# Patient Record
Sex: MALE | Race: WHITE
[De-identification: names, ages, dates, MRNs, and addresses within clinical notes are randomized per-mention and may not be internally consistent; named-entity substitution may affect disease eponyms.]

---

## 2017-08-30 ENCOUNTER — APPOINTMENT (OUTPATIENT)
Dept: DERMATOLOGY | Facility: CLINIC | Age: 77
End: 2017-08-30

## 2020-07-26 ENCOUNTER — INPATIENT (INPATIENT)
Facility: HOSPITAL | Age: 80
LOS: 1 days | Discharge: HOME | DRG: 312 | End: 2020-07-28
Attending: INTERNAL MEDICINE | Admitting: INTERNAL MEDICINE
Payer: MEDICARE

## 2020-07-26 VITALS
OXYGEN SATURATION: 100 % | DIASTOLIC BLOOD PRESSURE: 57 MMHG | WEIGHT: 223.99 LBS | SYSTOLIC BLOOD PRESSURE: 135 MMHG | HEIGHT: 71 IN | RESPIRATION RATE: 16 BRPM | TEMPERATURE: 98 F | HEART RATE: 38 BPM

## 2020-07-26 DIAGNOSIS — R55 SYNCOPE AND COLLAPSE: ICD-10-CM

## 2020-07-26 LAB
ALBUMIN SERPL ELPH-MCNC: 3.2 G/DL — LOW (ref 3.3–5)
ALP SERPL-CCNC: 58 U/L — SIGNIFICANT CHANGE UP (ref 40–120)
ALT FLD-CCNC: 18 U/L — SIGNIFICANT CHANGE UP (ref 12–78)
ANION GAP SERPL CALC-SCNC: 6 MMOL/L — SIGNIFICANT CHANGE UP (ref 5–17)
APTT BLD: 29.5 SEC — SIGNIFICANT CHANGE UP (ref 27.5–35.5)
AST SERPL-CCNC: 14 U/L — LOW (ref 15–37)
BASOPHILS # BLD AUTO: 0.07 K/UL — SIGNIFICANT CHANGE UP (ref 0–0.2)
BASOPHILS NFR BLD AUTO: 0.8 % — SIGNIFICANT CHANGE UP (ref 0–2)
BILIRUB SERPL-MCNC: 0.4 MG/DL — SIGNIFICANT CHANGE UP (ref 0.2–1.2)
BUN SERPL-MCNC: 17 MG/DL — SIGNIFICANT CHANGE UP (ref 7–23)
CALCIUM SERPL-MCNC: 8.8 MG/DL — SIGNIFICANT CHANGE UP (ref 8.5–10.1)
CHLORIDE SERPL-SCNC: 108 MMOL/L — SIGNIFICANT CHANGE UP (ref 96–108)
CO2 SERPL-SCNC: 27 MMOL/L — SIGNIFICANT CHANGE UP (ref 22–31)
CREAT SERPL-MCNC: 0.94 MG/DL — SIGNIFICANT CHANGE UP (ref 0.5–1.3)
EOSINOPHIL # BLD AUTO: 0.39 K/UL — SIGNIFICANT CHANGE UP (ref 0–0.5)
EOSINOPHIL NFR BLD AUTO: 4.6 % — SIGNIFICANT CHANGE UP (ref 0–6)
GLUCOSE SERPL-MCNC: 93 MG/DL — SIGNIFICANT CHANGE UP (ref 70–99)
HCT VFR BLD CALC: 37.8 % — LOW (ref 39–50)
HGB BLD-MCNC: 12.7 G/DL — LOW (ref 13–17)
IMM GRANULOCYTES NFR BLD AUTO: 0.7 % — SIGNIFICANT CHANGE UP (ref 0–1.5)
INR BLD: 1.23 RATIO — HIGH (ref 0.88–1.16)
LYMPHOCYTES # BLD AUTO: 2.11 K/UL — SIGNIFICANT CHANGE UP (ref 1–3.3)
LYMPHOCYTES # BLD AUTO: 25 % — SIGNIFICANT CHANGE UP (ref 13–44)
MAGNESIUM SERPL-MCNC: 2.3 MG/DL — SIGNIFICANT CHANGE UP (ref 1.6–2.6)
MCHC RBC-ENTMCNC: 31.4 PG — SIGNIFICANT CHANGE UP (ref 27–34)
MCHC RBC-ENTMCNC: 33.6 GM/DL — SIGNIFICANT CHANGE UP (ref 32–36)
MCV RBC AUTO: 93.6 FL — SIGNIFICANT CHANGE UP (ref 80–100)
MONOCYTES # BLD AUTO: 0.72 K/UL — SIGNIFICANT CHANGE UP (ref 0–0.9)
MONOCYTES NFR BLD AUTO: 8.5 % — SIGNIFICANT CHANGE UP (ref 2–14)
NEUTROPHILS # BLD AUTO: 5.09 K/UL — SIGNIFICANT CHANGE UP (ref 1.8–7.4)
NEUTROPHILS NFR BLD AUTO: 60.4 % — SIGNIFICANT CHANGE UP (ref 43–77)
NT-PROBNP SERPL-SCNC: 1020 PG/ML — HIGH (ref 0–450)
PLATELET # BLD AUTO: 183 K/UL — SIGNIFICANT CHANGE UP (ref 150–400)
POTASSIUM SERPL-MCNC: 4 MMOL/L — SIGNIFICANT CHANGE UP (ref 3.5–5.3)
POTASSIUM SERPL-SCNC: 4 MMOL/L — SIGNIFICANT CHANGE UP (ref 3.5–5.3)
PROT SERPL-MCNC: 6.7 GM/DL — SIGNIFICANT CHANGE UP (ref 6–8.3)
PROTHROM AB SERPL-ACNC: 14.1 SEC — HIGH (ref 10.6–13.6)
RBC # BLD: 4.04 M/UL — LOW (ref 4.2–5.8)
RBC # FLD: 11.9 % — SIGNIFICANT CHANGE UP (ref 10.3–14.5)
SARS-COV-2 IGG SERPL QL IA: NEGATIVE — SIGNIFICANT CHANGE UP
SARS-COV-2 IGM SERPL IA-ACNC: 0.09 INDEX — SIGNIFICANT CHANGE UP
SARS-COV-2 RNA SPEC QL NAA+PROBE: SIGNIFICANT CHANGE UP
SODIUM SERPL-SCNC: 141 MMOL/L — SIGNIFICANT CHANGE UP (ref 135–145)
TROPONIN I SERPL-MCNC: <0.015 NG/ML — SIGNIFICANT CHANGE UP (ref 0.01–0.04)
TROPONIN I SERPL-MCNC: <0.015 NG/ML — SIGNIFICANT CHANGE UP (ref 0.01–0.04)
WBC # BLD: 8.44 K/UL — SIGNIFICANT CHANGE UP (ref 3.8–10.5)
WBC # FLD AUTO: 8.44 K/UL — SIGNIFICANT CHANGE UP (ref 3.8–10.5)

## 2020-07-26 PROCEDURE — 86769 SARS-COV-2 COVID-19 ANTIBODY: CPT

## 2020-07-26 PROCEDURE — 84484 ASSAY OF TROPONIN QUANT: CPT

## 2020-07-26 PROCEDURE — 83735 ASSAY OF MAGNESIUM: CPT

## 2020-07-26 PROCEDURE — 36415 COLL VENOUS BLD VENIPUNCTURE: CPT

## 2020-07-26 PROCEDURE — 71045 X-RAY EXAM CHEST 1 VIEW: CPT | Mod: 26

## 2020-07-26 PROCEDURE — 97116 GAIT TRAINING THERAPY: CPT | Mod: GP

## 2020-07-26 PROCEDURE — 99223 1ST HOSP IP/OBS HIGH 75: CPT

## 2020-07-26 PROCEDURE — 87635 SARS-COV-2 COVID-19 AMP PRB: CPT

## 2020-07-26 PROCEDURE — 84100 ASSAY OF PHOSPHORUS: CPT

## 2020-07-26 PROCEDURE — 97162 PT EVAL MOD COMPLEX 30 MIN: CPT | Mod: GP

## 2020-07-26 PROCEDURE — 80048 BASIC METABOLIC PNL TOTAL CA: CPT

## 2020-07-26 PROCEDURE — 93010 ELECTROCARDIOGRAM REPORT: CPT

## 2020-07-26 PROCEDURE — 85027 COMPLETE CBC AUTOMATED: CPT

## 2020-07-26 RX ORDER — ASPIRIN/CALCIUM CARB/MAGNESIUM 324 MG
81 TABLET ORAL DAILY
Refills: 0 | Status: DISCONTINUED | OUTPATIENT
Start: 2020-07-26 | End: 2020-07-28

## 2020-07-26 RX ORDER — CLOPIDOGREL BISULFATE 75 MG/1
75 TABLET, FILM COATED ORAL DAILY
Refills: 0 | Status: DISCONTINUED | OUTPATIENT
Start: 2020-07-26 | End: 2020-07-28

## 2020-07-26 RX ORDER — SIMVASTATIN 20 MG/1
10 TABLET, FILM COATED ORAL AT BEDTIME
Refills: 0 | Status: DISCONTINUED | OUTPATIENT
Start: 2020-07-26 | End: 2020-07-28

## 2020-07-26 RX ORDER — ONDANSETRON 8 MG/1
4 TABLET, FILM COATED ORAL EVERY 6 HOURS
Refills: 0 | Status: DISCONTINUED | OUTPATIENT
Start: 2020-07-26 | End: 2020-07-28

## 2020-07-26 RX ORDER — CLOPIDOGREL BISULFATE 75 MG/1
1 TABLET, FILM COATED ORAL
Qty: 0 | Refills: 0 | DISCHARGE

## 2020-07-26 RX ORDER — SIMVASTATIN 20 MG/1
1 TABLET, FILM COATED ORAL
Qty: 0 | Refills: 0 | DISCHARGE

## 2020-07-26 RX ORDER — LISINOPRIL 2.5 MG/1
10 TABLET ORAL DAILY
Refills: 0 | Status: DISCONTINUED | OUTPATIENT
Start: 2020-07-26 | End: 2020-07-28

## 2020-07-26 RX ORDER — LISINOPRIL 2.5 MG/1
1 TABLET ORAL
Qty: 0 | Refills: 0 | DISCHARGE

## 2020-07-26 RX ORDER — ASPIRIN/CALCIUM CARB/MAGNESIUM 324 MG
324 TABLET ORAL ONCE
Refills: 0 | Status: COMPLETED | OUTPATIENT
Start: 2020-07-26 | End: 2020-07-26

## 2020-07-26 RX ADMIN — CLOPIDOGREL BISULFATE 75 MILLIGRAM(S): 75 TABLET, FILM COATED ORAL at 18:29

## 2020-07-26 RX ADMIN — SIMVASTATIN 10 MILLIGRAM(S): 20 TABLET, FILM COATED ORAL at 23:05

## 2020-07-26 RX ADMIN — LISINOPRIL 10 MILLIGRAM(S): 2.5 TABLET ORAL at 23:06

## 2020-07-26 RX ADMIN — Medication 324 MILLIGRAM(S): at 14:08

## 2020-07-26 NOTE — H&P ADULT - NSHPPHYSICALEXAM_GEN_ALL_CORE
heent nc at perrla  chest cta  cvs s1s2 reg no m/g/r  abd soft nt nd +bs  extr no e/c/c  neuro non focal

## 2020-07-26 NOTE — ED PROVIDER NOTE - OBJECTIVE STATEMENT
78 y/o male with a PMHx of prostate CA, hypercholesterolemia, psoriasis, abdominal hernia, MI s/p stents x3, presents to the ED s/p near syncopal episode today. Pt reports he stood up from his bed to go to the bathroom, took 3 steps, and became dizzy with a room spinning sensation and fell to the floor. Episode witnessed by pt wife. Denies head injury, LOC. No SOB, chest pain. Denies history of similar occurrence in the past. Denies current dizziness in ED. Former smoker (quit smoking cigars 4 weeks ago). No other complaints at this time. NKDA. PCP: Dr. Sameer Rodriguez. Cardio: Dr. Chaudhary.

## 2020-07-26 NOTE — H&P ADULT - HISTORY OF PRESENT ILLNESS
78 y/o male with a PMHx of prostate CA, hypercholesterolemia, psoriasis, abdominal hernia, MI s/p stents x3, presents to the ED s/p near syncopal episode today. Pt reports he stood up from his bed to go to the bathroom, took 3 steps, and became dizzy with a room spinning sensation and fell to the floor. Episode witnessed by pt wife. Denies head injury, LOC. No SOB, chest pain. Denies history of similar occurrence in the past. Still dizzy if he moves fast. Case d/w cardio adm to CCU and dc BB; SWAPNA castorena

## 2020-07-26 NOTE — ED ADULT NURSE NOTE - OBJECTIVE STATEMENT
patietn biba for near syncopal episode.  He was attempting to get from his bed to commode when room started spinning and he fell to the floor he denies LOC or head strike.   He was seen by PT in house house yesterday and told his HR was low, he contacted dr cunha's office and was told to  monitor for dizziness.  HR on arrival was in the 30's fluctuating between 34-57.  Pateint conversant throughout assessment.

## 2020-07-26 NOTE — H&P ADULT - ASSESSMENT
* Iatrogenic symptomatic bradycardia  dc BB  pads on the chest  gentle hydration  cardio f/up in am  EP eval doubt he will need PPM    * CAD stents  DAP  hold BB  ACEi    f/up official CXR    case d/w wife

## 2020-07-26 NOTE — ED ADULT TRIAGE NOTE - CHIEF COMPLAINT QUOTE
Pt. to the ED BIBA and Spouse C/O Dizziness. Wife states patient had near syncope episode and fell into floor. denies hitting head and LOC- + Cardiac hx and Hemorraghic stroke. Pt. currently bradycardic in the high 30s - Denies CP and SOB

## 2020-07-26 NOTE — ED PROVIDER NOTE - CLINICAL SUMMARY MEDICAL DECISION MAKING FREE TEXT BOX
79M hx cad s/p stents hld presents to the ED for near syncopal episode. now bradycardic sinus no signs of av block. will obtain labs trop cardiac monitor touch base with cardiology and reassess. Bert Larios M.D., Attending Physician

## 2020-07-26 NOTE — ED PROVIDER NOTE - PROGRESS NOTE DETAILS
Jodie Llanes for attending Dr. Larios: Paged cardiology. Jodie Llanes for attending Dr. Larios: Spoke with Dr. Logan covering for Dr. Chaudhary, recommends admission for observation overnight and will see pt in the morning. pt endorsed to dr. Martinez accepts. Bert Larios M.D., Attending Physician

## 2020-07-26 NOTE — ED PROVIDER NOTE - NS ED ROS FT
Constitutional: +near syncopal episode. No fever or chills  Eyes: No visual changes  HEENT: No throat pain  CV: No chest pain  Resp: No SOB no cough  GI: No abd pain, nausea or vomiting  : No dysuria  MSK: No musculoskeletal pain  Skin: No rash  Neuro: +dizziness. No headache, No LOC

## 2020-07-26 NOTE — ED PROVIDER NOTE - PMH
Abdominal Hernia    Hypercholesterolemia    Prostate Cancer  treated with radiation only  Psoriasis    S/P Angioplasty    s/p Angioplasty with Stent  to LAD and OM2 9/14/09  s/p Angioplasty with Stent (ICD9 V45.82)  10/1/09 to RCA

## 2020-07-26 NOTE — ED PROVIDER NOTE - NS_ ATTENDINGSCRIBEDETAILS _ED_A_ED_FT
I, Bert Larios MD,  performed the initial face to face bedside interview with this patient regarding history of present illness, review of symptoms and relevant past medical, social and family history.  I completed an independent physical examination.  I was the initial provider who evaluated this patient.  The history, relevant review of systems, past medical and surgical history, medical decision making, and physical examination was documented by the scribe in my presence and I attest to the accuracy of the documentation.

## 2020-07-26 NOTE — H&P ADULT - NSICDXPASTMEDICALHX_GEN_ALL_CORE_FT
PAST MEDICAL HISTORY:  Abdominal Hernia     Hypercholesterolemia     Prostate Cancer treated with radiation only    Psoriasis     S/P Angioplasty     s/p Angioplasty with Stent to LAD and OM2 9/14/09    s/p Angioplasty with Stent (ICD9 V45.82) 10/1/09 to RCA

## 2020-07-26 NOTE — ED PROVIDER NOTE - PHYSICAL EXAMINATION
Constitutional: NAD AAOx3  Eyes: PERRLA EOMI  Head: Normocephalic atraumatic  Mouth: MMM  Cardiac: regular rate   Resp: Lungs CTAB  GI: Abd s/nt/nd  Neuro: CN2-12 intact  Skin: No rashes Constitutional: NAD AAOx3  Eyes: PERRLA EOMI  Head: Normocephalic atraumatic  Mouth: MMM  Cardiac: bradycardic normal peripheral pulses no LE edema  Resp: Lungs CTAB  GI: Abd s/nt/nd  Neuro: CN2-12 intact  Skin: No rashes

## 2020-07-26 NOTE — H&P ADULT - NSHPLABSRESULTS_GEN_ALL_CORE
12.7   8.44  )-----------( 183      ( 26 Jul 2020 13:34 )             37.8   07-26    141  |  108  |  17  ----------------------------<  93  4.0   |  27  |  0.94    Ca    8.8      26 Jul 2020 13:34  Mg     2.3     07-26    TPro  6.7  /  Alb  3.2<L>  /  TBili  0.4  /  DBili  x   /  AST  14<L>  /  ALT  18  /  AlkPhos  58  07-26      EKG sinus abram, pvc    CXR my read: bibasilar atelectasis vs infiltrates- asymptomatic

## 2020-07-27 LAB
APPEARANCE UR: CLEAR — SIGNIFICANT CHANGE UP
BILIRUB UR-MCNC: NEGATIVE — SIGNIFICANT CHANGE UP
COLOR SPEC: YELLOW — SIGNIFICANT CHANGE UP
DIFF PNL FLD: NEGATIVE — SIGNIFICANT CHANGE UP
GLUCOSE UR QL: NEGATIVE MG/DL — SIGNIFICANT CHANGE UP
KETONES UR-MCNC: NEGATIVE — SIGNIFICANT CHANGE UP
LEUKOCYTE ESTERASE UR-ACNC: ABNORMAL
NITRITE UR-MCNC: NEGATIVE — SIGNIFICANT CHANGE UP
PH UR: 8 — SIGNIFICANT CHANGE UP (ref 5–8)
PROT UR-MCNC: 15 MG/DL
SP GR SPEC: 1.01 — SIGNIFICANT CHANGE UP (ref 1.01–1.02)
UROBILINOGEN FLD QL: NEGATIVE MG/DL — SIGNIFICANT CHANGE UP

## 2020-07-27 PROCEDURE — 99233 SBSQ HOSP IP/OBS HIGH 50: CPT

## 2020-07-27 PROCEDURE — 99223 1ST HOSP IP/OBS HIGH 75: CPT

## 2020-07-27 PROCEDURE — 93280 PM DEVICE PROGR EVAL DUAL: CPT | Mod: 26

## 2020-07-27 RX ORDER — SODIUM CHLORIDE 9 MG/ML
1000 INJECTION INTRAMUSCULAR; INTRAVENOUS; SUBCUTANEOUS
Refills: 0 | Status: DISCONTINUED | OUTPATIENT
Start: 2020-07-27 | End: 2020-07-28

## 2020-07-27 RX ADMIN — SODIUM CHLORIDE 50 MILLILITER(S): 9 INJECTION INTRAMUSCULAR; INTRAVENOUS; SUBCUTANEOUS at 10:11

## 2020-07-27 RX ADMIN — CLOPIDOGREL BISULFATE 75 MILLIGRAM(S): 75 TABLET, FILM COATED ORAL at 08:16

## 2020-07-27 RX ADMIN — LISINOPRIL 10 MILLIGRAM(S): 2.5 TABLET ORAL at 10:11

## 2020-07-27 RX ADMIN — SIMVASTATIN 10 MILLIGRAM(S): 20 TABLET, FILM COATED ORAL at 21:47

## 2020-07-27 RX ADMIN — Medication 81 MILLIGRAM(S): at 08:16

## 2020-07-27 NOTE — DIETITIAN INITIAL EVALUATION ADULT. - PHYSICAL APPEARANCE
overweight/other (specify) NFPE- no muscle/fat wasting noted.  Skin- stage 2 sacrum w/ R/L +2 edema documented. Beny score-16 (high risk of further skin breakdown)

## 2020-07-27 NOTE — CONSULT NOTE ADULT - ASSESSMENT
Dizziness  Sinus bradycardia sec to beta blockers  Orthostatic hypotension  CAD  HTN  HLD    Suggest:    Hydration. monitor orthostatics  Monitor off beta blockers for today. Given that he has PACs and PVCs (some in couplets) will be better with a reduced dose of atenolol 25 mg daily  Continue other current care.  Patient wants to be discharged home today - I think he needs further observation. If he does leave against advise he should follow up with me at the office tomorrow for Holter monitor and possibly ILR in future.

## 2020-07-27 NOTE — DIETITIAN INITIAL EVALUATION ADULT. - PERTINENT MEDS FT
MEDICATIONS  (STANDING):  aspirin enteric coated 81 milliGRAM(s) Oral daily  clopidogrel Tablet 75 milliGRAM(s) Oral daily  lisinopril 10 milliGRAM(s) Oral daily  simvastatin 10 milliGRAM(s) Oral at bedtime  sodium chloride 0.9%. 1000 milliLiter(s) (50 mL/Hr) IV Continuous <Continuous>    MEDICATIONS  (PRN):  aluminum hydroxide/magnesium hydroxide/simethicone Suspension 30 milliLiter(s) Oral every 4 hours PRN Dyspepsia  ondansetron Injectable 4 milliGRAM(s) IV Push every 6 hours PRN Nausea

## 2020-07-27 NOTE — PROGRESS NOTE ADULT - SUBJECTIVE AND OBJECTIVE BOX
HOSPITALIST PROGRESS NOTE:  SUBJECTIVE:  PCP:  Chief Complaint: Patient is a 79y old  Male who presents with a chief complaint of dizzy (2020 13:59)      HPI:  78 y/o male with a PMHx of prostate CA, hypercholesterolemia, psoriasis, abdominal hernia, MI s/p stents x3, presents to the ED s/p near syncopal episode today. Pt reports he stood up from his bed to go to the bathroom, took 3 steps, and became dizzy with a room spinning sensation and fell to the floor. Episode witnessed by pt wife. Denies head injury, LOC. No SOB, chest pain. Denies history of similar occurrence in the past. Still dizzy if he moves fast. Case d/w cardio adm to CCU and dc BB; SWAPNA castorena (2020 15:06)    : This morning significantly orthostatic and wants to go home; Explained to him that he cant amy he was dizzy going from lying to sitting       Allergies:  codeine (Vomiting; Nausea)    REVIEW OF SYSTEMS:  See HPI. All other review of systems is negative unless indicated above.     OBJECTIVE  Physical Exam:  Vital Signs:    Vital Signs Last 24 Hrs  T(C): 36.8 (2020 13:00), Max: 36.8 (2020 13:00)  T(F): 98.2 (2020 13:00), Max: 98.2 (2020 13:00)  HR: 51 (2020 16:00) (44 - 60)  BP: 139/56 (2020 16:00) (125/84 - 158/66)  BP(mean): 79 (2020 16:00) (76 - 94)  RR: 18 (2020 16:00) (6 - 20)  SpO2: 98% (2020 16:00) (95% - 100%)  I&O's Summary    2020 07:01  -  2020 07:00  --------------------------------------------------------  IN: 240 mL / OUT: 775 mL / NET: -535 mL    2020 07:01  -  2020 16:41  --------------------------------------------------------  IN: 840 mL / OUT: 625 mL / NET: 215 mL      Constitutional: NAD, awake and alert, well-developed  Neurological: AAO x 3, no focal deficits  HEENT: PERRLA, EOMI, MMM  Neck: Soft and supple, No LAD, No JVD  Respiratory: Breath sounds are clear bilaterally, No wheezing, rales or rhonchi  Cardiovascular: S1 and S2, regular rate and rhythm; no Murmurs, gallops or rubs  Gastrointestinal: Bowel Sounds present, soft, nontender, nondistended, no guarding, no rebound tenderness  Back: No CVA tenderness   Extremities: No peripheral edema  Vascular: 2+ peripheral pulses  Musculoskeletal: 5/5 strength b/l upper and lower extremities  Skin: No rashes  Breast: Deferred  Rectal: Deferred    MEDICATIONS  (STANDING):  aspirin enteric coated 81 milliGRAM(s) Oral daily  clopidogrel Tablet 75 milliGRAM(s) Oral daily  lisinopril 10 milliGRAM(s) Oral daily  simvastatin 10 milliGRAM(s) Oral at bedtime  sodium chloride 0.9%. 1000 milliLiter(s) (50 mL/Hr) IV Continuous <Continuous>      LABS: All Labs Reviewed:                        12.7   8.44  )-----------( 183      ( 2020 13:34 )             37.8     07-26    141  |  108  |  17  ----------------------------<  93  4.0   |  27  |  0.94    Ca    8.8      2020 13:34  Mg     2.3     07-26    TPro  6.7  /  Alb  3.2<L>  /  TBili  0.4  /  DBili  x   /  AST  14<L>  /  ALT  18  /  AlkPhos  58  07-26    PT/INR - ( 2020 13:34 )   PT: 14.1 sec;   INR: 1.23 ratio         PTT - ( 2020 13:34 )  PTT:29.5 sec  CARDIAC MARKERS ( 2020 16:55 )  <0.015 ng/mL / x     / x     / x     / x      CARDIAC MARKERS ( 2020 13:34 )  <0.015 ng/mL / x     / x     / x     / x            Urinalysis Basic - ( 2020 14:30 )    Color: Yellow / Appearance: Clear / S.010 / pH: x  Gluc: x / Ketone: Negative  / Bili: Negative / Urobili: Negative mg/dL   Blood: x / Protein: 15 mg/dL / Nitrite: Negative   Leuk Esterase: Trace / RBC: Negative /HPF / WBC 6-10   Sq Epi: x / Non Sq Epi: Few / Bacteria: Occasional      RADIOLOGY/EKG:    < from: Xray Chest 1 View-PORTABLE IMMEDIATE (20 @ 13:47) >    IMPRESSION: No active pulmonary disease.    Thank you for this referral.    < end of copied text >

## 2020-07-27 NOTE — CONSULT NOTE ADULT - ASSESSMENT
78 y/o male with a PMHx of prostate CA, hypercholesterolemia, psoriasis, abdominal hernia, MI s/p stents x3, presents to the ED s/p near syncopal episode today. Pt reports he stood up from his bed to go to the bathroom, took 3 steps, and became dizzy with a room spinning sensation and fell to the floor. Episode witnessed by pt wife. Denies head injury, LOC. No SOB, chest pain. Denies history of similar occurrence in the past. In ED patient found to be bradycardic.  EP asked to consult for symptomatic bradycardia.    A/P: Symptomatic bradycardia 78 y/o male with a PMHx of prostate CA, hypercholesterolemia, psoriasis, abdominal hernia, MI s/p stents x3, presents to the ED s/p near syncopal episode today. Pt reports he stood up from his bed to go to the bathroom, took 3 steps, and became dizzy with a room spinning sensation and fell to the floor. Episode witnessed by pt wife. Denies head injury, LOC. No SOB, chest pain. Denies history of similar occurrence in the past. In ED patient found to be bradycardic.  EP asked to consult for symptomatic bradycardia.    A/P: Symptomatic bradycardia  Continue telemonitoring  Monitor Electrolytes  Avoid further Atenolol  MCOT upon discharge  Further care per medicine 80 y/o male with a PMHx of prostate CA, hypercholesterolemia, psoriasis, abdominal hernia, MI s/p stents x3, presents to the ED s/p near syncopal episode today. Pt reports he stood up from his bed to go to the bathroom, took 3 steps, and became dizzy with a room spinning sensation and fell to the floor. Episode witnessed by pt wife. Denies head injury, LOC. No SOB, chest pain. Denies history of similar occurrence in the past. In ED patient found to be bradycardic.  EP asked to consult for symptomatic bradycardia.    A/P: Symptomatic bradycardia  Continue telemonitoring  Monitor orthostatics  Monitor Electrolytes  Avoid further Atenolol or other AV pio blockers  Patient should have MCOT placed upon discharge  Further care per medicine  Plan discussed with Dr. Mascorro 78 y/o male with a PMHx of prostate CA, hypercholesterolemia, psoriasis, abdominal hernia, MI s/p stents x3, presents to the ED s/p near syncopal episode today. Pt reports he stood up from his bed to go to the bathroom, took 3 steps, and became dizzy with a room spinning sensation and fell to the floor. Episode witnessed by pt wife. Denies head injury, LOC. No SOB, chest pain. Denies history of similar occurrence in the past. In ED patient found to be bradycardic.  EP asked to consult for symptomatic bradycardia.    A/P: Symptomatic bradycardia and sinus node dysfunction, patient likely has chronotropic incompetence (as per wife when he was dizzy after stadning up then sat down and HR 40s bpm)  however unclear if symptoms are related to sinus bradycardia, it appears that he is dehydrated and dizziness elicited only after he stood up  encouraged him to stay hydrated and wear compression stocking   if he continues to have symptoms that correlate with sinus bradycardia despite maintaining adequate hydration then he warrants PPM for symptomatic bradycardia   Continue telemonitoring  Monitor orthostatics  Monitor Electrolytes  Avoid further Atenolol or other AV pio blockers  Patient should have MCOT placed upon discharge follow with Dr Chaudhary and EP as needed  Further care per medicine  Plan discussed with Dr. Mascorro

## 2020-07-27 NOTE — CONSULT NOTE ADULT - SUBJECTIVE AND OBJECTIVE BOX
Patient is a 79y old  Male who presents with a chief complaint of dizzy (26 Jul 2020 15:06)      HPI:  80 y/o male with a PMHx of prostate CA, hypercholesterolemia, psoriasis, abdominal hernia, MI s/p stents x3, presents to the ED s/p near syncopal episode today. Pt reports he stood up from his bed to go to the bathroom, took 3 steps, and became dizzy with a room spinning sensation and fell to the floor. Episode witnessed by pt wife. Denies head injury, LOC. No SOB, chest pain. Denies history of similar occurrence in the past. Still dizzy if he moves fast. Case d/w cardio adm to CCU and dc BB; EP raffaele (26 Jul 2020 15:06)      PAST MEDICAL & SURGICAL HISTORY:  s/p Angioplasty with Stent (ICD9 V45.82): 10/1/09 to RCA  S/P Angioplasty  s/p Angioplasty with Stent: to LAD and OM2 9/14/09  Abdominal Hernia  Psoriasis  Hypercholesterolemia  Prostate Cancer: treated with radiation only  S/P Shoulder Surgery: right  History of Tonsillectomy      PREVIOUS CARDIAC WORKUP:      Echo:  Stress Test:  Cardiac Cath:    ALLERGIES:    codeine (Vomiting; Nausea)       MEDICATIONS  (STANDING):  aspirin enteric coated 81 milliGRAM(s) Oral daily  clopidogrel Tablet 75 milliGRAM(s) Oral daily  lisinopril 10 milliGRAM(s) Oral daily  simvastatin 10 milliGRAM(s) Oral at bedtime  sodium chloride 0.9%. 1000 milliLiter(s) (50 mL/Hr) IV Continuous <Continuous>    MEDICATIONS  (PRN):  aluminum hydroxide/magnesium hydroxide/simethicone Suspension 30 milliLiter(s) Oral every 4 hours PRN Dyspepsia  ondansetron Injectable 4 milliGRAM(s) IV Push every 6 hours PRN Nausea      FAMILY HISTORY:        SOCIAL HISTORY:  .scl     ROS:     .ros    Vital Signs Last 24 Hrs  T(C): 36.7 (27 Jul 2020 08:18), Max: 36.8 (26 Jul 2020 13:22)  T(F): 98.1 (27 Jul 2020 08:18), Max: 98.3 (26 Jul 2020 13:22)  HR: 44 (27 Jul 2020 08:00) (38 - 57)  BP: 149/61 (27 Jul 2020 08:00) (132/56 - 156/57)  BP(mean): 82 (27 Jul 2020 08:00) (76 - 94)  RR: 20 (27 Jul 2020 08:00) (6 - 20)  SpO2: 100% (27 Jul 2020 08:00) (95% - 100%)    I&O's Summary    26 Jul 2020 07:01  -  27 Jul 2020 07:00  --------------------------------------------------------  IN: 240 mL / OUT: 775 mL / NET: -535 mL    27 Jul 2020 07:01  -  27 Jul 2020 09:38  --------------------------------------------------------  IN: 120 mL / OUT: 300 mL / NET: -180 mL        PHYSICAL EXAM:    .phy      TELEMETRY:    ECG:    LABS:                          12.7   8.44  )-----------( 183      ( 26 Jul 2020 13:34 )             37.8     07-26    141  |  108  |  17  ----------------------------<  93  4.0   |  27  |  0.94    Ca    8.8      26 Jul 2020 13:34  Mg     2.3     07-26    TPro  6.7  /  Alb  3.2<L>  /  TBili  0.4  /  DBili  x   /  AST  14<L>  /  ALT  18  /  AlkPhos  58  07-26 07-26 @ 16:55  Trop-I  <0.015    07-26 @ 13:34  Trop-I  <0.015    Pro BNP  1020 07-26 @ 13:34    PT/INR - ( 26 Jul 2020 13:34 )   PT: 14.1 sec;   INR: 1.23 ratio    PTT - ( 26 Jul 2020 13:34 )  PTT:29.5 sec      RADIOLOGY & ADDITIONAL STUDIES:    Xray Chest 1 View-PORTABLE IMMEDIATE (07.26.20 @ 13:47) >  IMPRESSION: No active pulmonary disease. Chief complaint of dizzy (26 Jul 2020 15:06)      HPI:    79-year-old male admitted with complaints of dizziness and a fall.  Near syncopal episode.  No loss of consciousness.  Patient reports dizziness when he stood up from his bed to go to the bathroom.  He became dizzy, feeling of room spinning around him and subsequently took a fall to the floor.  He was unable to get up even with the help of his wife and his son.  Ambulance was called for further help.  Patient was found to be bradycardic.  Currently no more dizziness.  Blood pressure is better but he is orthostatic.      PAST MEDICAL & SURGICAL HISTORY:  b/l thalamic CVA March 2020  CAD s/p Angioplasty with Stent (ICD9 V45.82): 10/1/09 to RCA  S/P Angioplasty  s/p Angioplasty with Stent: to LAD and OM2 9/14/09  Abdominal Hernia  Psoriasis  Hypercholesterolemia  Prostate Cancer: treated with radiation only  S/P Shoulder Surgery: right  History of Tonsillectomy      PREVIOUS CARDIAC WORKUP:      Echo:  8/19  --There is moderate left atrial dilatation (LA volume index 42 ml/m²).  --The left ventricle is mildly dilated.  --LV global wall motion is normal. The basal inferior segment is moderately hypokinetic.  --LV ejection fraction (55 %) is normal.  --The left ventricular filling pattern is normal.  --There is mild right atrial dilatation.  --There is trace mitral regurgitation.  --There is mild tricuspid regurgitation.  --The right atrial pressure is 6 - 10 mm Hg. There is minimal pulmonary hypertension.  --There is no pericardial effusion.    Stress Test:  8/19   1. Myocardial perfusion imaging revealed no ischemia and a medium size infarct with stress.   2. Gated study showed a rest EF of 57%.   3. Indeterminate ECG evidence of ischemia. Abnormal baseline EKG.   4. Myocardial Perfusion SPECT images are abnormal.   5. Medium size, moderate to severe defect in the basal inferior, basal inferolateral and mid inferolateral wall that is fixed.   6. Rest gated analysis showed overall normal left ventricular function with normal LV size.   7. Wall motion was abnormal.   8. Gated study showed an exercise EF of 55%.   9. Post-stress analysis showed no change in LV function.    Cardiac Cath Lab (10.29.09 @ 13:59) >  Ventricles: No LV gram was performed during this study; however, a prior LV gram demonstrated an EF of 60 %.  Coronary vessels: The coronary circulation is right dominant.  LM:      LM: Normal.  LAD:      Proximal LAD: Angiography showed mild atherosclerosis with no flow limiting lesions. Mid LAD: Angiography showed mild atherosclerosis with no flow limiting lesions. Patent stent. Distal vessel angiography showed a large sized vessel and minor luminal irregularities.  CX:      Proximal circumflex: Angiography showed mild atherosclerosis with no flow limiting lesions. OM2: Angiography showed minor luminal irregularities with no flow limiting lesions. Patent stent. Distal vessel angiography showed a large sized vessel.  RCA:      Mid RCA: Angiography showed minor luminal irregularities with no flow limiting lesions. Patent stent. Distal vessel angiography showed a large sized vessel and mild diffuse disease.      ALLERGIES:    codeine (Vomiting; Nausea)       MEDICATIONS  (STANDING):  aspirin enteric coated 81 milliGRAM(s) Oral daily  clopidogrel Tablet 75 milliGRAM(s) Oral daily  lisinopril 10 milliGRAM(s) Oral daily  simvastatin 10 milliGRAM(s) Oral at bedtime  sodium chloride 0.9%. 1000 milliLiter(s) (50 mL/Hr) IV Continuous <Continuous>    MEDICATIONS  (PRN):  aluminum hydroxide/magnesium hydroxide/simethicone Suspension 30 milliLiter(s) Oral every 4 hours PRN Dyspepsia  ondansetron Injectable 4 milliGRAM(s) IV Push every 6 hours PRN Nausea      FAMILY HISTORY:   NC        SOCIAL HISTORY:  Nonsmoker. No ETOH abuse. No illicit drugs.     ROS:     Detailed ten system ROS was performed and was negative except for history as eluded to above.    no fever  no chills  no nausea  no vomiting  no diarrhea  no constipation  no melena  no hematochezia  no chest pain  no palpitations  no sob at rest  no dyspnea on exertion  no cough  no wheezing  no anorexia  no headache  no dizziness - resolved   no syncope  no weakness  no myalgia  no dysuria  no polyuria  no hematuria     Vital Signs Last 24 Hrs  T(C): 36.7 (27 Jul 2020 08:18), Max: 36.8 (26 Jul 2020 13:22)  T(F): 98.1 (27 Jul 2020 08:18), Max: 98.3 (26 Jul 2020 13:22)  HR: 44 (27 Jul 2020 08:00) (38 - 57)  BP: 149/61 (27 Jul 2020 08:00) (132/56 - 156/57)  BP(mean): 82 (27 Jul 2020 08:00) (76 - 94)  RR: 20 (27 Jul 2020 08:00) (6 - 20)  SpO2: 100% (27 Jul 2020 08:00) (95% - 100%)    I&O's Summary    26 Jul 2020 07:01  -  27 Jul 2020 07:00  --------------------------------------------------------  IN: 240 mL / OUT: 775 mL / NET: -535 mL    27 Jul 2020 07:01  -  27 Jul 2020 09:38  --------------------------------------------------------  IN: 120 mL / OUT: 300 mL / NET: -180 mL        PHYSICAL EXAM:    General:                Comfortable, AAO X 3, in no distress.  HEENT:                  Atraumatic, PERRLA, EOMI, conjunctiva clear.    Neck:                     Supple, no adenopathy, no thyromegaly, no JVD, no bruit.  Back:                     Symmetric, non tender.  Chest:                    Clear, B/L symmetric air entry, no tachypnea  Heart:                     S1, S2 normal, no gallop, no murmur.  Abdomen:              Soft, non-tender, bowel sounds active. No palpable masses.  Extremities:           no cyanosis, no edema. Peripheral pulses normal.  Skin:                      Skin color, texture normal. No rashes.  Neurologic:            Grossly nonfocal.       TELEMETRY:  Sinus bradycardia. No significant pauses.  Rare PACs noted and rare PVCs rarely in couplets.    ECG:  Sinus bradycardia. No AV block    LABS:                          12.7   8.44  )-----------( 183      ( 26 Jul 2020 13:34 )             37.8     07-26    141  |  108  |  17  ----------------------------<  93  4.0   |  27  |  0.94    Ca    8.8      26 Jul 2020 13:34  Mg     2.3     07-26    TPro  6.7  /  Alb  3.2<L>  /  TBili  0.4  /  DBili  x   /  AST  14<L>  /  ALT  18  /  AlkPhos  58  07-26 07-26 @ 16:55  Trop-I  <0.015    07-26 @ 13:34  Trop-I  <0.015    Pro BNP  1020 07-26 @ 13:34    PT/INR - ( 26 Jul 2020 13:34 )   PT: 14.1 sec;   INR: 1.23 ratio    PTT - ( 26 Jul 2020 13:34 )  PTT:29.5 sec      RADIOLOGY & ADDITIONAL STUDIES:    Xray Chest 1 View-PORTABLE IMMEDIATE (07.26.20 @ 13:47) >  IMPRESSION: No active pulmonary disease.

## 2020-07-27 NOTE — DIETITIAN INITIAL EVALUATION ADULT. - PERTINENT LABORATORY DATA
07-26 Na141 mmol/L Glu 93 mg/dL K+ 4.0 mmol/L Cr  0.94 mg/dL BUN 17 mg/dL Phos n/a   Alb 3.2 g/dL<L> PAB n/a

## 2020-07-27 NOTE — DIETITIAN INITIAL EVALUATION ADULT. - ADD RECOMMEND
1) Continue with optimal po intake and adherence to therapeutic diet 2) Suggest add MVI w/minerals, Vit C 500 mg BID, add Zinc Sulfate 220 mg x 10 days to promote wound healing. 3) daily weights 4) turn and position for optimal wound healing.

## 2020-07-27 NOTE — CONSULT NOTE ADULT - SUBJECTIVE AND OBJECTIVE BOX
HPI:  78 y/o male with a PMHx of prostate CA, hypercholesterolemia, psoriasis, abdominal hernia, MI s/p stents x3, presents to the ED s/p near syncopal episode today. Pt reports he stood up from his bed to go to the bathroom, took 3 steps, and became dizzy with a room spinning sensation and fell to the floor. Episode witnessed by pt wife. Denies head injury, LOC. No SOB, chest pain. Denies history of similar occurrence in the past. In ED patient found to be bradycardic.  EP asked to consult for symptomatic bradycardia.      PAST MEDICAL & SURGICAL HISTORY:  s/p Angioplasty with Stent (ICD9 V45.82): 10/1/09 to RCA  S/P Angioplasty  s/p Angioplasty with Stent: to LAD and OM2 09  Abdominal Hernia  Psoriasis  Hypercholesterolemia  Prostate Cancer: treated with radiation only  S/P Shoulder Surgery: right  History of Tonsillectomy      MEDICATIONS  (STANDING):  aspirin enteric coated 81 milliGRAM(s) Oral daily  clopidogrel Tablet 75 milliGRAM(s) Oral daily  lisinopril 10 milliGRAM(s) Oral daily  simvastatin 10 milliGRAM(s) Oral at bedtime  sodium chloride 0.9%. 1000 milliLiter(s) (50 mL/Hr) IV Continuous <Continuous>    MEDICATIONS  (PRN):  aluminum hydroxide/magnesium hydroxide/simethicone Suspension 30 milliLiter(s) Oral every 4 hours PRN Dyspepsia  ondansetron Injectable 4 milliGRAM(s) IV Push every 6 hours PRN Nausea      Allergies    codeine (Vomiting; Nausea)          SOCIAL HISTORY: Denies tobacco, etoh abuse or illicit drug use    FAMILY HISTORY:      Vital Signs Last 24 Hrs  T(C): 36.7 (2020 08:18), Max: 36.7 (2020 08:18)  T(F): 98.1 (2020 08:18), Max: 98.1 (2020 08:18)  HR: 52 (2020 11:00) (38 - 60)  BP: 158/66 (2020 11:00) (125/84 - 158/66)  BP(mean): 86 (2020 11:00) (76 - 94)  RR: 14 (2020 11:00) (6 - 20)  SpO2: 98% (2020 11:00) (95% - 100%)    REVIEW OF SYSTEMS:    CONSTITUTIONAL:  As per HPI.  HEENT:  Eyes:  No diplopia or blurred vision. ENT:  No earache, sore throat or runny nose.  CARDIOVASCULAR:  No pressure, squeezing, strangling, tightness, heaviness or aching about the chest, neck, axilla or epigastrium.  RESPIRATORY:  No cough, shortness of breath, PND or orthopnea.  GASTROINTESTINAL:  No nausea, vomiting or diarrhea.  GENITOURINARY:  No dysuria, frequency or urgency.  MUSCULOSKELETAL:  As per HPI.  SKIN:  No change in skin, hair or nails.  NEUROLOGIC:  No paresthesias, fasciculations, seizures or weakness.  PSYCHIATRIC:  No disorder of thought or mood.  ENDOCRINE:  No heat or cold intolerance, polyuria or polydipsia.  HEMATOLOGICAL:  No easy bruising or bleedings:  .     PHYSICAL EXAMINATION:    GENERAL APPEARANCE:  Pt. is not currently dyspneic, in no distress. Pt. is alert, oriented, and pleasant.  HEENT:  Pupils are normal and react normally. No icterus. Mucous membranes well colored.  NECK:  Supple. No lymphadenopathy. Jugular venous pressure not elevated. Carotids equal.   HEART:   The cardiac impulse has a normal quality. There are no murmurs, rubs or gallops noted  CHEST:  Chest is clear to auscultation. Normal respiratory effort.  ABDOMEN:  Soft and nontender.   EXTREMITIES:  There is no edema.   SKIN:  No rash or significant lesions are noted.    I&O's Summary    2020 07:  -  2020 07:00  --------------------------------------------------------  IN: 240 mL / OUT: 775 mL / NET: -535 mL    2020 07:  -  2020 14:02  --------------------------------------------------------  IN: 120 mL / OUT: 425 mL / NET: -305 mL        LABS:                        12.7   8.44  )-----------( 183      ( 2020 13:34 )             37.8         141  |  108  |  17  ----------------------------<  93  4.0   |  27  |  0.94    Ca    8.8      2020 13:34  Mg     2.3         TPro  6.7  /  Alb  3.2<L>  /  TBili  0.4  /  DBili  x   /  AST  14<L>  /  ALT  18  /  AlkPhos  58  07-    LIVER FUNCTIONS - ( 2020 13:34 )  Alb: 3.2 g/dL / Pro: 6.7 gm/dL / ALK PHOS: 58 U/L / ALT: 18 U/L / AST: 14 U/L / GGT: x           PT/INR - ( 2020 13:34 )   PT: 14.1 sec;   INR: 1.23 ratio         PTT - ( 2020 13:34 )  PTT:29.5 sec  CARDIAC MARKERS ( 2020 16:55 )  <0.015 ng/mL / x     / x     / x     / x      CARDIAC MARKERS ( 2020 13:34 )  <0.015 ng/mL / x     / x     / x     / x                EK2020:  SB@45BPM  DC:152ms  QRS:90ms  QT/Qtc:506/437ms    TELEMETRY: SB with HR 40-50s    CARDIAC TESTS:      RADIOLOGY & ADDITIONAL STUDIES:    ASSESSMENT & PLAN: HPI:  78 y/o male with a PMHx of prostate CA, hypercholesterolemia, psoriasis, abdominal hernia, MI s/p stents x3, presents to the ED s/p near syncopal episode today. Pt reports he stood up from his bed to go to the bathroom, took 3 steps, and became dizzy with a room spinning sensation and fell to the floor. Episode witnessed by pt wife. Denies head injury, LOC. No SOB, chest pain. Denies history of similar occurrence in the past. In ED patient found to be bradycardic.  EP asked to consult for symptomatic bradycardia.      PAST MEDICAL & SURGICAL HISTORY:  s/p Angioplasty with Stent (ICD9 V45.82): 10/1/09 to RCA  S/P Angioplasty  s/p Angioplasty with Stent: to LAD and OM2 09  Abdominal Hernia  Psoriasis  Hypercholesterolemia  Prostate Cancer: treated with radiation only  S/P Shoulder Surgery: right  History of Tonsillectomy      MEDICATIONS  (STANDING):  aspirin enteric coated 81 milliGRAM(s) Oral daily  clopidogrel Tablet 75 milliGRAM(s) Oral daily  lisinopril 10 milliGRAM(s) Oral daily  simvastatin 10 milliGRAM(s) Oral at bedtime  sodium chloride 0.9%. 1000 milliLiter(s) (50 mL/Hr) IV Continuous <Continuous>    MEDICATIONS  (PRN):  aluminum hydroxide/magnesium hydroxide/simethicone Suspension 30 milliLiter(s) Oral every 4 hours PRN Dyspepsia  ondansetron Injectable 4 milliGRAM(s) IV Push every 6 hours PRN Nausea      Allergies    codeine (Vomiting; Nausea)          SOCIAL HISTORY: Denies tobacco, etoh abuse or illicit drug use    FAMILY HISTORY:      Vital Signs Last 24 Hrs  T(C): 36.7 (2020 08:18), Max: 36.7 (2020 08:18)  T(F): 98.1 (2020 08:18), Max: 98.1 (2020 08:18)  HR: 52 (2020 11:00) (38 - 60)  BP: 158/66 (2020 11:00) (125/84 - 158/66)  BP(mean): 86 (2020 11:00) (76 - 94)  RR: 14 (2020 11:00) (6 - 20)  SpO2: 98% (2020 11:00) (95% - 100%)    REVIEW OF SYSTEMS:    CONSTITUTIONAL:  As per HPI.  HEENT:  Eyes:  No diplopia or blurred vision. ENT:  No earache, sore throat or runny nose.  CARDIOVASCULAR:  No pressure, squeezing, strangling, tightness, heaviness or aching about the chest, neck, axilla or epigastrium.  RESPIRATORY:  No cough, shortness of breath, PND or orthopnea.  GASTROINTESTINAL:  No nausea, vomiting or diarrhea.  GENITOURINARY:  No dysuria, frequency or urgency.  MUSCULOSKELETAL:  As per HPI.  SKIN:  No change in skin, hair or nails.  NEUROLOGIC:  No paresthesias, fasciculations, seizures or weakness.  PSYCHIATRIC:  No disorder of thought or mood.  ENDOCRINE:  No heat or cold intolerance, polyuria or polydipsia.  HEMATOLOGICAL:  No easy bruising or bleedings:  .     PHYSICAL EXAMINATION:    GENERAL APPEARANCE:  Pt. is not currently dyspneic, in no distress. Pt. is alert, oriented, and pleasant.  HEENT:  Pupils are normal and react normally. No icterus. Mucous membranes well colored.  NECK:  Supple. No lymphadenopathy. Jugular venous pressure not elevated. Carotids equal.   HEART:   The cardiac impulse has a normal quality. There are no murmurs, rubs or gallops noted  CHEST:  Chest is clear to auscultation. Normal respiratory effort.  ABDOMEN:  Soft and nontender.   EXTREMITIES:  There is no edema.   SKIN:  No rash or significant lesions are noted.    I&O's Summary    2020 07:  -  2020 07:00  --------------------------------------------------------  IN: 240 mL / OUT: 775 mL / NET: -535 mL    2020 07:  -  2020 14:02  --------------------------------------------------------  IN: 120 mL / OUT: 425 mL / NET: -305 mL        LABS:                        12.7   8.44  )-----------( 183      ( 2020 13:34 )             37.8         141  |  108  |  17  ----------------------------<  93  4.0   |  27  |  0.94    Ca    8.8      2020 13:34  Mg     2.3         TPro  6.7  /  Alb  3.2<L>  /  TBili  0.4  /  DBili  x   /  AST  14<L>  /  ALT  18  /  AlkPhos  58  07-    LIVER FUNCTIONS - ( 2020 13:34 )  Alb: 3.2 g/dL / Pro: 6.7 gm/dL / ALK PHOS: 58 U/L / ALT: 18 U/L / AST: 14 U/L / GGT: x           PT/INR - ( 2020 13:34 )   PT: 14.1 sec;   INR: 1.23 ratio         PTT - ( 2020 13:34 )  PTT:29.5 sec  CARDIAC MARKERS ( 2020 16:55 )  <0.015 ng/mL / x     / x     / x     / x      CARDIAC MARKERS ( 2020 13:34 )  <0.015 ng/mL / x     / x     / x     / x                EK2020:  SB@45BPM  MT:152ms  QRS:90ms  QT/Qtc:506/437ms    TELEMETRY: SB with HR 40-50s    CARDIAC TESTS:    Previous cardiac work up:  Stress Test:     1. Myocardial perfusion imaging revealed no ischemia and a medium size infarct with stress.   2. Gated study showed a rest EF of 57%.   3. Indeterminate ECG evidence of ischemia. Abnormal baseline EKG.   4. Myocardial Perfusion SPECT images are abnormal.   5. Medium size, moderate to severe defect in the basal inferior, basal inferolateral and mid inferolateral wall that is fixed.   6. Rest gated analysis showed overall normal left ventricular function with normal LV size.   7. Wall motion was abnormal.   8. Gated study showed an exercise EF of 55%.   9. Post-stress analysis showed no change in LV function.    Echo:    --There is moderate left atrial dilatation (LA volume index 42 ml/m²).  --The left ventricle is mildly dilated.  --LV global wall motion is normal. The basal inferior segment is moderately hypokinetic.  --LV ejection fraction (55 %) is normal.  --The left ventricular filling pattern is normal.  --There is mild right atrial dilatation.  --There is trace mitral regurgitation.  --There is mild tricuspid regurgitation.  --The right atrial pressure is 6 - 10 mm Hg. There is minimal pulmonary hypertension.  --There is no pericardial effusion.    RADIOLOGY & ADDITIONAL STUDIES:  < from: Xray Chest 1 View-PORTABLE IMMEDIATE (20 @ 13:47) >    INTERPRETATION:  Chest one view    HISTORY: Chest pain    Radiographic examination shows the heart to be enlarged in size. The lungs are clear and show no bleb formation. There is no evidence of focal infiltrate, pneumothorax or pleural effusion. Old right rib fracture is present.    IMPRESSION: No active pulmonary disease. HPI:  78 y/o male with a PMHx of prostate CA, hypercholesterolemia, psoriasis, abdominal hernia, MI s/p stents x3, presents to the ED s/p near syncopal episode today. Pt reports he stood up from his bed to go to the bathroom, took 3 steps, and became dizzy with a room spinning sensation and fell to the floor. Episode witnessed by pt wife. Denies head injury, LOC. No SOB, chest pain. Denies history of similar occurrence in the past. In ED patient found to be bradycardic.  EP asked to consult for symptomatic bradycardia.      PAST MEDICAL & SURGICAL HISTORY:  b/l thalamic CVA 2020  s/p Angioplasty with Stent (ICD9 V45.82): 10/1/09 to RCA  S/P Angioplasty  s/p Angioplasty with Stent: to LAD and OM2 09  Abdominal Hernia  Psoriasis  Hypercholesterolemia  Prostate Cancer: treated with radiation only  S/P Shoulder Surgery: right  History of Tonsillectomy      MEDICATIONS  (STANDING):  aspirin enteric coated 81 milliGRAM(s) Oral daily  clopidogrel Tablet 75 milliGRAM(s) Oral daily  lisinopril 10 milliGRAM(s) Oral daily  simvastatin 10 milliGRAM(s) Oral at bedtime  sodium chloride 0.9%. 1000 milliLiter(s) (50 mL/Hr) IV Continuous <Continuous>    MEDICATIONS  (PRN):  aluminum hydroxide/magnesium hydroxide/simethicone Suspension 30 milliLiter(s) Oral every 4 hours PRN Dyspepsia  ondansetron Injectable 4 milliGRAM(s) IV Push every 6 hours PRN Nausea      Allergies    codeine (Vomiting; Nausea)          SOCIAL HISTORY: Denies tobacco, etoh abuse or illicit drug use    FAMILY HISTORY:      Vital Signs Last 24 Hrs  T(C): 36.7 (2020 08:18), Max: 36.7 (2020 08:18)  T(F): 98.1 (2020 08:18), Max: 98.1 (2020 08:18)  HR: 52 (2020 11:00) (38 - 60)  BP: 158/66 (2020 11:00) (125/84 - 158/66)  BP(mean): 86 (2020 11:00) (76 - 94)  RR: 14 (2020 11:00) (6 - 20)  SpO2: 98% (2020 11:00) (95% - 100%)    REVIEW OF SYSTEMS:    CONSTITUTIONAL:  As per HPI.  HEENT:  Eyes:  No diplopia or blurred vision. ENT:  No earache, sore throat or runny nose.  CARDIOVASCULAR:  No pressure, squeezing, strangling, tightness, heaviness or aching about the chest, neck, axilla or epigastrium.  RESPIRATORY:  No cough, shortness of breath, PND or orthopnea.  GASTROINTESTINAL:  No nausea, vomiting or diarrhea.  GENITOURINARY:  No dysuria, frequency or urgency.  MUSCULOSKELETAL:  As per HPI.  SKIN:  No change in skin, hair or nails.  NEUROLOGIC:  No paresthesias, fasciculations, seizures or weakness.  PSYCHIATRIC:  No disorder of thought or mood.  ENDOCRINE:  No heat or cold intolerance, polyuria or polydipsia.  HEMATOLOGICAL:  No easy bruising or bleedings:  .     PHYSICAL EXAMINATION:    GENERAL APPEARANCE:  Pt. is not currently dyspneic, in no distress. Pt. is A&Ox2-3  HEENT:  Pupils are normal and react normally. No icterus. Mucous membranes well colored.  NECK:  Supple. No lymphadenopathy. Jugular venous pressure not elevated. Carotids equal.   HEART:   The cardiac impulse has a normal quality. There are no murmurs, rubs or gallops noted  CHEST:  Chest is clear to auscultation. Normal respiratory effort.  ABDOMEN:  Soft and nontender.   EXTREMITIES:  There is no edema.   SKIN:  No rash or significant lesions are noted.    I&O's Summary    2020 07:  -  2020 07:00  --------------------------------------------------------  IN: 240 mL / OUT: 775 mL / NET: -535 mL    2020 07:  -  2020 14:02  --------------------------------------------------------  IN: 120 mL / OUT: 425 mL / NET: -305 mL        LABS:                        12.7   8.44  )-----------( 183      ( 2020 13:34 )             37.8         141  |  108  |  17  ----------------------------<  93  4.0   |  27  |  0.94    Ca    8.8      2020 13:34  Mg     2.3         TPro  6.7  /  Alb  3.2<L>  /  TBili  0.4  /  DBili  x   /  AST  14<L>  /  ALT  18  /  AlkPhos  58      LIVER FUNCTIONS - ( 2020 13:34 )  Alb: 3.2 g/dL / Pro: 6.7 gm/dL / ALK PHOS: 58 U/L / ALT: 18 U/L / AST: 14 U/L / GGT: x           PT/INR - ( 2020 13:34 )   PT: 14.1 sec;   INR: 1.23 ratio         PTT - ( 2020 13:34 )  PTT:29.5 sec  CARDIAC MARKERS ( 2020 16:55 )  <0.015 ng/mL / x     / x     / x     / x      CARDIAC MARKERS ( 2020 13:34 )  <0.015 ng/mL / x     / x     / x     / x                EK2020:  SB@45BPM  NE:152ms  QRS:90ms  QT/Qtc:506/437ms    TELEMETRY: SB with HR 40-50s    CARDIAC TESTS:    Previous cardiac work up:  Stress Test:     1. Myocardial perfusion imaging revealed no ischemia and a medium size infarct with stress.   2. Gated study showed a rest EF of 57%.   3. Indeterminate ECG evidence of ischemia. Abnormal baseline EKG.   4. Myocardial Perfusion SPECT images are abnormal.   5. Medium size, moderate to severe defect in the basal inferior, basal inferolateral and mid inferolateral wall that is fixed.   6. Rest gated analysis showed overall normal left ventricular function with normal LV size.   7. Wall motion was abnormal.   8. Gated study showed an exercise EF of 55%.   9. Post-stress analysis showed no change in LV function.    Echo:    --There is moderate left atrial dilatation (LA volume index 42 ml/m²).  --The left ventricle is mildly dilated.  --LV global wall motion is normal. The basal inferior segment is moderately hypokinetic.  --LV ejection fraction (55 %) is normal.  --The left ventricular filling pattern is normal.  --There is mild right atrial dilatation.  --There is trace mitral regurgitation.  --There is mild tricuspid regurgitation.  --The right atrial pressure is 6 - 10 mm Hg. There is minimal pulmonary hypertension.  --There is no pericardial effusion.    RADIOLOGY & ADDITIONAL STUDIES:  < from: Xray Chest 1 View-PORTABLE IMMEDIATE (20 @ 13:47) >    INTERPRETATION:  Chest one view    HISTORY: Chest pain    Radiographic examination shows the heart to be enlarged in size. The lungs are clear and show no bleb formation. There is no evidence of focal infiltrate, pneumothorax or pleural effusion. Old right rib fracture is present.    IMPRESSION: No active pulmonary disease. HPI:  78 y/o male with a PMHx of prostate CA, hypercholesterolemia, psoriasis, abdominal hernia, MI s/p stents x3, presents to the ED s/p near syncopal episode today. Pt reports he stood up from his bed to go to the bathroom, took 3 steps, and became dizzy with a room spinning sensation and fell to the floor. Episode witnessed by pt wife. Denies head injury, LOC. No SOB, chest pain. Denies history of similar occurrence in the past but according to wife, Atenolol dose was recently decreased due to "low heart rate" and "dizziness" from 50mg to 25mg daily.  In ED patient found to be bradycardic and orthostatic.  EP asked to consult for symptomatic bradycardia.      PAST MEDICAL & SURGICAL HISTORY:  b/l thalamic CVA 2020  s/p Angioplasty with Stent (ICD9 V45.82): 10/1/09 to RCA  S/P Angioplasty  s/p Angioplasty with Stent: to LAD and OM2 09  Abdominal Hernia  Psoriasis  Hypercholesterolemia  Prostate Cancer: treated with radiation only  S/P Shoulder Surgery: right  History of Tonsillectomy      MEDICATIONS  (STANDING):  aspirin enteric coated 81 milliGRAM(s) Oral daily  clopidogrel Tablet 75 milliGRAM(s) Oral daily  lisinopril 10 milliGRAM(s) Oral daily  simvastatin 10 milliGRAM(s) Oral at bedtime  sodium chloride 0.9%. 1000 milliLiter(s) (50 mL/Hr) IV Continuous <Continuous>    MEDICATIONS  (PRN):  aluminum hydroxide/magnesium hydroxide/simethicone Suspension 30 milliLiter(s) Oral every 4 hours PRN Dyspepsia  ondansetron Injectable 4 milliGRAM(s) IV Push every 6 hours PRN Nausea      Allergies    codeine (Vomiting; Nausea)          SOCIAL HISTORY: Denies tobacco, etoh abuse or illicit drug use    FAMILY HISTORY:      Vital Signs Last 24 Hrs  T(C): 36.7 (2020 08:18), Max: 36.7 (2020 08:18)  T(F): 98.1 (2020 08:18), Max: 98.1 (2020 08:18)  HR: 52 (2020 11:00) (38 - 60)  BP: 158/66 (2020 11:00) (125/84 - 158/66)  BP(mean): 86 (2020 11:00) (76 - 94)  RR: 14 (2020 11:00) (6 - 20)  SpO2: 98% (2020 11:00) (95% - 100%)    REVIEW OF SYSTEMS:    CONSTITUTIONAL:  As per HPI.  HEENT:  Eyes:  No diplopia or blurred vision. ENT:  No earache, sore throat or runny nose.  CARDIOVASCULAR:  No pressure, squeezing, strangling, tightness, heaviness or aching about the chest, neck, axilla or epigastrium.  RESPIRATORY:  No cough, shortness of breath, PND or orthopnea.  GASTROINTESTINAL:  No nausea, vomiting or diarrhea.  GENITOURINARY:  No dysuria, frequency or urgency.  MUSCULOSKELETAL:  As per HPI.  SKIN:  No change in skin, hair or nails.  NEUROLOGIC:  No paresthesias, fasciculations, seizures or weakness.  PSYCHIATRIC:  No disorder of thought or mood.  ENDOCRINE:  No heat or cold intolerance, polyuria or polydipsia.  HEMATOLOGICAL:  No easy bruising or bleedings:  .     PHYSICAL EXAMINATION:    GENERAL APPEARANCE:  Pt. is not currently dyspneic, in no distress. Pt. is A&Ox2-3  HEENT:  Pupils are normal and react normally. No icterus. Mucous membranes well colored.  NECK:  Supple. No lymphadenopathy. Jugular venous pressure not elevated. Carotids equal.   HEART:   The cardiac impulse has a normal quality. There are no murmurs, rubs or gallops noted  CHEST:  Chest is clear to auscultation. Normal respiratory effort.  ABDOMEN:  Soft and nontender.   EXTREMITIES:  There is no edema.   SKIN:  No rash or significant lesions are noted.    I&O's Summary    2020 07:  -  2020 07:00  --------------------------------------------------------  IN: 240 mL / OUT: 775 mL / NET: -535 mL    :  -  2020 14:02  --------------------------------------------------------  IN: 120 mL / OUT: 425 mL / NET: -305 mL        LABS:                        12.7   8.44  )-----------( 183      ( 2020 13:34 )             37.8         141  |  108  |  17  ----------------------------<  93  4.0   |  27  |  0.94    Ca    8.8      2020 13:34  Mg     2.3         TPro  6.7  /  Alb  3.2<L>  /  TBili  0.4  /  DBili  x   /  AST  14<L>  /  ALT  18  /  AlkPhos  58      LIVER FUNCTIONS - ( 2020 13:34 )  Alb: 3.2 g/dL / Pro: 6.7 gm/dL / ALK PHOS: 58 U/L / ALT: 18 U/L / AST: 14 U/L / GGT: x           PT/INR - ( 2020 13:34 )   PT: 14.1 sec;   INR: 1.23 ratio         PTT - ( 2020 13:34 )  PTT:29.5 sec  CARDIAC MARKERS ( 2020 16:55 )  <0.015 ng/mL / x     / x     / x     / x      CARDIAC MARKERS ( 2020 13:34 )  <0.015 ng/mL / x     / x     / x     / x                EK2020:  SB@45BPM  VT:152ms  QRS:90ms  QT/Qtc:506/437ms    TELEMETRY: SB with HR 40-60s    CARDIAC TESTS:    Previous cardiac work up:  Stress Test:     1. Myocardial perfusion imaging revealed no ischemia and a medium size infarct with stress.   2. Gated study showed a rest EF of 57%.   3. Indeterminate ECG evidence of ischemia. Abnormal baseline EKG.   4. Myocardial Perfusion SPECT images are abnormal.   5. Medium size, moderate to severe defect in the basal inferior, basal inferolateral and mid inferolateral wall that is fixed.   6. Rest gated analysis showed overall normal left ventricular function with normal LV size.   7. Wall motion was abnormal.   8. Gated study showed an exercise EF of 55%.   9. Post-stress analysis showed no change in LV function.    Echo:    --There is moderate left atrial dilatation (LA volume index 42 ml/m²).  --The left ventricle is mildly dilated.  --LV global wall motion is normal. The basal inferior segment is moderately hypokinetic.  --LV ejection fraction (55 %) is normal.  --The left ventricular filling pattern is normal.  --There is mild right atrial dilatation.  --There is trace mitral regurgitation.  --There is mild tricuspid regurgitation.  --The right atrial pressure is 6 - 10 mm Hg. There is minimal pulmonary hypertension.  --There is no pericardial effusion.    RADIOLOGY & ADDITIONAL STUDIES:  < from: Xray Chest 1 View-PORTABLE IMMEDIATE (20 @ 13:47) >    INTERPRETATION:  Chest one view    HISTORY: Chest pain    Radiographic examination shows the heart to be enlarged in size. The lungs are clear and show no bleb formation. There is no evidence of focal infiltrate, pneumothorax or pleural effusion. Old right rib fracture is present.    IMPRESSION: No active pulmonary disease.

## 2020-07-27 NOTE — PROGRESS NOTE ADULT - ASSESSMENT
*Syncope 2ndry to Orthostatic Hypotension  *Symptomatic bradycardia  -TELEMETRY: SB with HR 40-60s  -Positive Orthostatic  -IVF  -dc BB  -pads on the chest  -cardio consult appreciated - Discussed with Dr Chaudhary may want to D/C on lower dose of Atenolol 25 QD given that he has PACS and PVCs  -EP consult appreciated - Avoid further Atenolol or other AV pio blockers; Patient should have MCOT placed upon discharge  -PT consult     *CAD stents  DAP  hold BB  ACEi  Statin    Dispo - patient here with syncope and bradycardia; started on IVF; EP wants to avoid any further BB and should have MCOT prior to D/C; Discuss with cardio and EP if they want to D/C patient on BB. patient eager to go home; FU BP, HR, EP, PT and Cardio; POssible D/C jona

## 2020-07-27 NOTE — DIETITIAN INITIAL EVALUATION ADULT. - OTHER INFO
78 y/o male with a PMHx of prostate CA, hypercholesterolemia, psoriasis, abdominal hernia, MI s/p stents x3, presents to the ED s/p near syncopal episode today. Pt reports he stood up from his bed to go to the bathroom, took 3 steps, and became dizzy with a room spinning sensation and fell to the floor. Episode witnessed by pt wife. Denies head injury, LOC. No SOB, chest pain. Denies history of similar occurrence in the past. Still dizzy if he moves fast. Pending cardiology consult (Dr. Chaudhary). PTA pt followed no specific therapeutic diet and denies any significant weight changes. Appetite is excellent consumed ~100% of observed breakfast. 7/26- Serum Pro-BNP 1020 w/ +2 R/L leg edema documented. Pt with Sacrum pressure injury stage 2. Discussed and diet educational materials provided on cardiac diet. Pt aware of risks of non compliance to diet. Encouraged pt to incorporate protein enriched foods for wound healing and optimal protein stores. Will continue to monitor and follow up prn.

## 2020-07-28 ENCOUNTER — TRANSCRIPTION ENCOUNTER (OUTPATIENT)
Age: 80
End: 2020-07-28

## 2020-07-28 VITALS — TEMPERATURE: 97 F

## 2020-07-28 LAB
ANION GAP SERPL CALC-SCNC: 5 MMOL/L — SIGNIFICANT CHANGE UP (ref 5–17)
BUN SERPL-MCNC: 16 MG/DL — SIGNIFICANT CHANGE UP (ref 7–23)
CALCIUM SERPL-MCNC: 8.5 MG/DL — SIGNIFICANT CHANGE UP (ref 8.5–10.1)
CHLORIDE SERPL-SCNC: 111 MMOL/L — HIGH (ref 96–108)
CO2 SERPL-SCNC: 27 MMOL/L — SIGNIFICANT CHANGE UP (ref 22–31)
CREAT SERPL-MCNC: 0.99 MG/DL — SIGNIFICANT CHANGE UP (ref 0.5–1.3)
GLUCOSE SERPL-MCNC: 146 MG/DL — HIGH (ref 70–99)
HCT VFR BLD CALC: 39.1 % — SIGNIFICANT CHANGE UP (ref 39–50)
HGB BLD-MCNC: 13.2 G/DL — SIGNIFICANT CHANGE UP (ref 13–17)
MAGNESIUM SERPL-MCNC: 2.2 MG/DL — SIGNIFICANT CHANGE UP (ref 1.6–2.6)
MCHC RBC-ENTMCNC: 31.3 PG — SIGNIFICANT CHANGE UP (ref 27–34)
MCHC RBC-ENTMCNC: 33.8 GM/DL — SIGNIFICANT CHANGE UP (ref 32–36)
MCV RBC AUTO: 92.7 FL — SIGNIFICANT CHANGE UP (ref 80–100)
PHOSPHATE SERPL-MCNC: 2.7 MG/DL — SIGNIFICANT CHANGE UP (ref 2.5–4.5)
PLATELET # BLD AUTO: 150 K/UL — SIGNIFICANT CHANGE UP (ref 150–400)
POTASSIUM SERPL-MCNC: 4.1 MMOL/L — SIGNIFICANT CHANGE UP (ref 3.5–5.3)
POTASSIUM SERPL-SCNC: 4.1 MMOL/L — SIGNIFICANT CHANGE UP (ref 3.5–5.3)
RBC # BLD: 4.22 M/UL — SIGNIFICANT CHANGE UP (ref 4.2–5.8)
RBC # FLD: 12 % — SIGNIFICANT CHANGE UP (ref 10.3–14.5)
SODIUM SERPL-SCNC: 143 MMOL/L — SIGNIFICANT CHANGE UP (ref 135–145)
WBC # BLD: 8.22 K/UL — SIGNIFICANT CHANGE UP (ref 3.8–10.5)
WBC # FLD AUTO: 8.22 K/UL — SIGNIFICANT CHANGE UP (ref 3.8–10.5)

## 2020-07-28 PROCEDURE — 99239 HOSP IP/OBS DSCHRG MGMT >30: CPT

## 2020-07-28 RX ORDER — ATENOLOL 25 MG/1
1 TABLET ORAL
Qty: 0 | Refills: 0 | DISCHARGE

## 2020-07-28 RX ADMIN — Medication 81 MILLIGRAM(S): at 09:36

## 2020-07-28 RX ADMIN — CLOPIDOGREL BISULFATE 75 MILLIGRAM(S): 75 TABLET, FILM COATED ORAL at 09:36

## 2020-07-28 RX ADMIN — LISINOPRIL 10 MILLIGRAM(S): 2.5 TABLET ORAL at 09:36

## 2020-07-28 NOTE — DISCHARGE NOTE PROVIDER - NSDCCPCAREPLAN_GEN_ALL_CORE_FT
PRINCIPAL DISCHARGE DIAGNOSIS  Diagnosis: Near syncope  Assessment and Plan of Treatment:       SECONDARY DISCHARGE DIAGNOSES  Diagnosis: Symptomatic bradycardia  Assessment and Plan of Treatment:

## 2020-07-28 NOTE — PHYSICAL THERAPY INITIAL EVALUATION ADULT - GAIT DEVIATIONS NOTED, PT EVAL
decreased stride length/mildly stooped, lumbering gait quality ,tends to lag behind RW/decreased step length

## 2020-07-28 NOTE — DISCHARGE NOTE NURSING/CASE MANAGEMENT/SOCIAL WORK - PATIENT PORTAL LINK FT
You can access the FollowMyHealth Patient Portal offered by [facility] by registering at the following website: [email or web address]. By joining [employer]’s FollowMyHealth portal, you will also be able to view your health information using other applications (apps) compatible with our system.

## 2020-07-28 NOTE — PHYSICAL THERAPY INITIAL EVALUATION ADULT - GENERAL OBSERVATIONS, REHAB EVAL
sitting up out of bed in chair wrapped in blankets ,HM in place ,adult diaper (dry) in use ,asleep but arousable to repeated stim,lethargic/somnolent opens eyes but habitually closing R eye (reports +diplopia since stroke 4-5 weeks ago)

## 2020-07-28 NOTE — DISCHARGE NOTE PROVIDER - CARE PROVIDER_API CALL
Leatha Chaudhary  CARDIOLOGY  [address] [facility] [place] [address]  Phone: [phone]  Fax: [phone]  Follow Up Time:

## 2020-07-28 NOTE — DISCHARGE NOTE PROVIDER - HOSPITAL COURSE
Chief Complaint: Patient is a 79y old  Male who presents with a chief complaint of dizzy (27 Jul 2020 13:59)            HPI:    78 y/o male with a PMHx of prostate CA, hypercholesterolemia, psoriasis, abdominal hernia, MI s/p stents x3, presents to the ED s/p near syncopal episode today. Pt reports he stood up from his bed to go to the bathroom, took 3 steps, and became dizzy with a room spinning sensation and fell to the floor. Episode witnessed by pt wife. Denies head injury, LOC. No SOB, chest pain. Denies history of similar occurrence in the past. Still dizzy if he moves fast. Case d/w cardio adm to CCU and dc BB; SWAPNA castorena (26 Jul 2020 15:06)        7/27: This morning significantly orthostatic and wants to go home; Explained to him that he cant amy he was dizzy going from lying to sitting     7.28: no distress, eager to go home         Allergies:    codeine (Vomiting; Nausea)        REVIEW OF SYSTEMS:    See HPI. All other review of systems is negative unless indicated above.         OBJECTIVE    Physical Exam:    Vital Signs:        Vital Signs Last 24 Hrs    T(C): 35.9 (28 Jul 2020 09:40), Max: 36.7 (27 Jul 2020 17:08)    T(F): 96.6 (28 Jul 2020 09:40), Max: 98 (27 Jul 2020 17:08)    HR: 82 (28 Jul 2020 13:00) (46 - 82)    BP: 127/79 (28 Jul 2020 13:00) (88/44 - 176/72)    BP(mean): 92 (28 Jul 2020 13:00) (55 - 103)    RR: 18 (28 Jul 2020 13:00) (10 - 22)    SpO2: 96% (28 Jul 2020 09:00) (96% - 99%)    26 Jul 2020 07:01  -  27 Jul 2020 07:00    --------------------------------------------------------    IN: 240 mL / OUT: 775 mL / NET: -535 mL        27 Jul 2020 07:01  -  27 Jul 2020 16:41    --------------------------------------------------------    IN: 840 mL / OUT: 625 mL / NET: 215 mL            Constitutional: NAD, awake and alert, well-developed    Neurological: AAO x 3, no focal deficits    HEENT: PERRLA, EOMI, MMM    Neck: Soft and supple, No LAD, No JVD    Respiratory: Breath sounds are clear bilaterally, No wheezing, rales or rhonchi    Cardiovascular: S1 and S2, regular rate and rhythm; no Murmurs, gallops or rubs    Gastrointestinal: Bowel Sounds present, soft, nontender, nondistended, no guarding, no rebound tenderness    Back: No CVA tenderness     Extremities: No peripheral edema    Vascular: 2+ peripheral pulses    Musculoskeletal: 5/5 strength b/l upper and lower extremities    Skin: No rashes    Breast: Deferred    Rectal: Deferred        Assessment and Plan:         *Syncope 2ndry to Orthostatic Hypotension        *Symptomatic bradycardia    -TELEMETRY: SB with HR 40-60s    -Positive Orthostatic    -given IVF    -dc BB    -EP consult appreciated - Avoid further Atenolol or other AV pio blockers;     Patient should have MCOT placed today in dr Chaudhary's office         *CAD stents    DAP    hold BB    ACEi    Statin        dc planning , time >35min

## 2020-07-28 NOTE — PROGRESS NOTE ADULT - SUBJECTIVE AND OBJECTIVE BOX
HPI:  80 y/o male with a PMHx of prostate CA, hypercholesterolemia, psoriasis, abdominal hernia, MI s/p stents x3, presents to the ED s/p near syncopal episode today. Pt reports he stood up from his bed to go to the bathroom, took 3 steps, and became dizzy with a room spinning sensation and fell to the floor. Episode witnessed by pt wife. Denies head injury, LOC. No SOB, chest pain. Denies history of similar occurrence in the past but according to wife, Atenolol dose was recently decreased due to "low heart rate" and "dizziness" from 50mg to 25mg daily.  In ED patient found to be bradycardic and orthostatic.  EP asked to consult for symptomatic bradycardia.    2020: Patient seen at bedside. Denies any CP, palpitations, SOB, dizziness, lightheadedness.  Tele reviewed SB with HR 40-50s, no pauses noted.  Orthostatic blood pressures this AM reviewed, significantly improved with fluids.      MEDICATIONS  (STANDING):  aspirin enteric coated 81 milliGRAM(s) Oral daily  clopidogrel Tablet 75 milliGRAM(s) Oral daily  lisinopril 10 milliGRAM(s) Oral daily  simvastatin 10 milliGRAM(s) Oral at bedtime  sodium chloride 0.9%. 1000 milliLiter(s) (50 mL/Hr) IV Continuous <Continuous>    MEDICATIONS  (PRN):  aluminum hydroxide/magnesium hydroxide/simethicone Suspension 30 milliLiter(s) Oral every 4 hours PRN Dyspepsia  ondansetron Injectable 4 milliGRAM(s) IV Push every 6 hours PRN Nausea      MEDICATIONS  (STANDING):  aspirin enteric coated 81 milliGRAM(s) Oral daily  clopidogrel Tablet 75 milliGRAM(s) Oral daily  lisinopril 10 milliGRAM(s) Oral daily  simvastatin 10 milliGRAM(s) Oral at bedtime  sodium chloride 0.9%. 1000 milliLiter(s) (50 mL/Hr) IV Continuous <Continuous>    MEDICATIONS  (PRN):  aluminum hydroxide/magnesium hydroxide/simethicone Suspension 30 milliLiter(s) Oral every 4 hours PRN Dyspepsia  ondansetron Injectable 4 milliGRAM(s) IV Push every 6 hours PRN Nausea    ICU Vital Signs Last 24 Hrs  T(C): 35.9 (2020 09:40), Max: 36.8 (2020 13:00)  T(F): 96.6 (2020 09:40), Max: 98.2 (2020 13:00)  HR: 54 (2020 10:00) (46 - 61)  BP: 174/69 (2020 10:00) (88/44 - 176/72)  BP(mean): 91 (2020 10:00) (55 - 99)  ABP: --  ABP(mean): --  RR: 18 (2020 10:00) (10 - 21)  SpO2: 96% (2020 09:00) (96% - 99%)          PHYSICAL EXAMINATION:    GENERAL APPEARANCE:  Pt. is not currently dyspneic, in no distress. Pt. is A&Ox2-3  HEENT:  Pupils are normal and react normally. No icterus. Mucous membranes well colored.  NECK:  Supple. No lymphadenopathy. Jugular venous pressure not elevated. Carotids equal.   HEART:   The cardiac impulse has a normal quality. There are no murmurs, rubs or gallops noted  CHEST:  Chest is clear to auscultation. Normal respiratory effort.  ABDOMEN:  Soft and nontender.   EXTREMITIES:  There is no edema.   SKIN:  No rash or significant lesions are noted.        141  |  108  |  17  ----------------------------<  93  4.0   |  27  |  0.94    Ca    8.8      2020 13:34  Mg     2.3         TPro  6.7  /  Alb  3.2<L>  /  TBili  0.4  /  DBili  x   /  AST  14<L>  /  ALT  18  /  AlkPhos  58                            13.2   8.22  )-----------( 150      ( 2020 10:50 )             39.1              EK2020:  SB@45BPM  MN:152ms  QRS:90ms  QT/Qtc:506/437ms    TELEMETRY: SB with HR 40-60s    CARDIAC TESTS:    Previous cardiac work up:  Stress Test:     1. Myocardial perfusion imaging revealed no ischemia and a medium size infarct with stress.   2. Gated study showed a rest EF of 57%.   3. Indeterminate ECG evidence of ischemia. Abnormal baseline EKG.   4. Myocardial Perfusion SPECT images are abnormal.   5. Medium size, moderate to severe defect in the basal inferior, basal inferolateral and mid inferolateral wall that is fixed.   6. Rest gated analysis showed overall normal left ventricular function with normal LV size.   7. Wall motion was abnormal.   8. Gated study showed an exercise EF of 55%.   9. Post-stress analysis showed no change in LV function.    Echo:    --There is moderate left atrial dilatation (LA volume index 42 ml/m²).  --The left ventricle is mildly dilated.  --LV global wall motion is normal. The basal inferior segment is moderately hypokinetic.  --LV ejection fraction (55 %) is normal.  --The left ventricular filling pattern is normal.  --There is mild right atrial dilatation.  --There is trace mitral regurgitation.  --There is mild tricuspid regurgitation.  --The right atrial pressure is 6 - 10 mm Hg. There is minimal pulmonary hypertension.  --There is no pericardial effusion.    RADIOLOGY & ADDITIONAL STUDIES:  < from: Xray Chest 1 View-PORTABLE IMMEDIATE (20 @ 13:47) >    INTERPRETATION:  Chest one view    HISTORY: Chest pain    Radiographic examination shows the heart to be enlarged in size. The lungs are clear and show no bleb formation. There is no evidence of focal infiltrate, pneumothorax or pleural effusion. Old right rib fracture is present.    IMPRESSION: No active pulmonary disease.

## 2020-07-28 NOTE — PHYSICAL THERAPY INITIAL EVALUATION ADULT - CRITERIA FOR SKILLED THERAPEUTIC INTERVENTIONS
predicted duration of therapy intervention/rehab potential/therapy frequency/anticipated discharge recommendation/functional limitations in following categories/risk reduction/prevention/anticipated equipment needs at discharge/impairments found

## 2020-07-28 NOTE — PROGRESS NOTE ADULT - ASSESSMENT
78 y/o male with a PMHx of prostate CA, hypercholesterolemia, psoriasis, abdominal hernia, MI s/p stents x3, presents to the ED s/p near syncopal episode today. Pt reports he stood up from his bed to go to the bathroom, took 3 steps, and became dizzy with a room spinning sensation and fell to the floor. Episode witnessed by pt wife. Denies head injury, LOC. No SOB, chest pain. Denies history of similar occurrence in the past. In ED patient found to be bradycardic.  EP asked to consult for symptomatic bradycardia.    A/P: Symptomatic bradycardia and sinus node dysfunction, patient likely has chronotropic incompetence (as per wife when he was dizzy after stadning up then sat down and HR 40s bpm)  Maintain adequate hydration  If he continues to have symptoms that correlate with sinus bradycardia despite maintaining adequate hydration then he warrants PPM for symptomatic bradycardia   MCOT upon discharge to be coordinated with Dr. Chaudhary's office  Outpatient follow up with Dr. Chaudhary and EP as needed  Avoid further Atenolol or other AV pio blockers  Stable for discharge from EP standpoint  Plan discussed with Dr. Chaudhary, Dr. Mascorro and RN

## 2020-07-28 NOTE — PROGRESS NOTE ADULT - SUBJECTIVE AND OBJECTIVE BOX
CURRENT CARDIAC WORKUP:       Echo:  Stress Test:  Cardiac Cath:    Allergies:   codeine (Vomiting; Nausea)      MEDICATIONS  (STANDING):  aspirin enteric coated 81 milliGRAM(s) Oral daily  clopidogrel Tablet 75 milliGRAM(s) Oral daily  lisinopril 10 milliGRAM(s) Oral daily  simvastatin 10 milliGRAM(s) Oral at bedtime  sodium chloride 0.9%. 1000 milliLiter(s) (50 mL/Hr) IV Continuous <Continuous>    MEDICATIONS  (PRN):  aluminum hydroxide/magnesium hydroxide/simethicone Suspension 30 milliLiter(s) Oral every 4 hours PRN Dyspepsia  ondansetron Injectable 4 milliGRAM(s) IV Push every 6 hours PRN Nausea      ROS:     .ros      Vital Signs Last 24 Hrs  T(C): 35.9 (2020 09:40), Max: 36.8 (2020 13:00)  T(F): 96.6 (2020 09:40), Max: 98.2 (2020 13:00)  HR: 48 (2020 08:00) (46 - 61)  BP: 170/61 (2020 08:00) (108/75 - 176/72)  BP(mean): 90 (2020 08:00) (78 - 99)  RR: 18 (2020 08:00) (10 - 21)  SpO2: 99% (2020 08:00) (97% - 99%)    I&O's Summary    2020 07:  -  2020 07:00  --------------------------------------------------------  IN: 1500 mL / OUT: 925 mL / NET: 575 mL    2020 07:01  -  2020 09:50  --------------------------------------------------------  IN: 0 mL / OUT: 150 mL / NET: -150 mL        PHYSICAL EXAM:    .phy      TELEMETRY:    ECG:    LABS:                        12.7   8.44  )-----------( 183      ( 2020 13:34 )             37.8         141  |  108  |  17  ----------------------------<  93  4.0   |  27  |  0.94    Ca    8.8      2020 13:34  Mg     2.3         TPro  6.7  /  Alb  3.2<L>  /  TBili  0.4  /  DBili  x   /  AST  14<L>  /  ALT  18  /  AlkPhos  58   @ 16:55  Trop-I <0.015  CK     --  CK MB  --     @ 13:34  Trop-I <0.015  CK     --  CK MB  --    Pro BNP  1020  @ 13:34  D Dimer  --  @ 13:34    PT/INR - ( 2020 13:34 )   PT: 14.1 sec;   INR: 1.23 ratio         PTT - ( 2020 13:34 )  PTT:29.5 sec  Urinalysis Basic - ( 2020 14:30 )    Color: Yellow / Appearance: Clear / S.010 / pH: x  Gluc: x / Ketone: Negative  / Bili: Negative / Urobili: Negative mg/dL   Blood: x / Protein: 15 mg/dL / Nitrite: Negative   Leuk Esterase: Trace / RBC: Negative /HPF / WBC 6-10   Sq Epi: x / Non Sq Epi: Few / Bacteria: Occasional            RADIOLOGY & ADDITIONAL STUDIES: 79-year-old male admitted with complaints of dizziness and a fall.  Near syncopal episode.  No loss of consciousness.  Patient reports dizziness when he stood up from his bed to go to the bathroom.  He became dizzy, feeling of room spinning around him and subsequently took a fall to the floor.  He was unable to get up even with the help of his wife and his son.  Ambulance was called for further help.  Patient was found to be bradycardic.  Currently no more dizziness.  Blood pressure is better but he is orthostatic.    Feels better today. Atenolol remains on hold. HR is better. No significant tachyarrhythmias noted.  No more orthostasis after hydration.    PAST MEDICAL & SURGICAL HISTORY:  b/l thalamic CVA March 2020  CAD s/p Angioplasty with Stent (ICD9 V45.82): 10/1/09 to RCA  S/P Angioplasty  s/p Angioplasty with Stent: to LAD and OM2 9/14/09  Abdominal Hernia  Psoriasis  Hypercholesterolemia  Prostate Cancer: treated with radiation only  S/P Shoulder Surgery: right  History of Tonsillectomy      PREVIOUS CARDIAC WORKUP:      Echo:  8/19  --There is moderate left atrial dilatation (LA volume index 42 ml/m²).  --The left ventricle is mildly dilated.  --LV global wall motion is normal. The basal inferior segment is moderately hypokinetic.  --LV ejection fraction (55 %) is normal.  --The left ventricular filling pattern is normal.  --There is mild right atrial dilatation.  --There is trace mitral regurgitation.  --There is mild tricuspid regurgitation.  --The right atrial pressure is 6 - 10 mm Hg. There is minimal pulmonary hypertension.  --There is no pericardial effusion.    Stress Test:  8/19   1. Myocardial perfusion imaging revealed no ischemia and a medium size infarct with stress.   2. Gated study showed a rest EF of 57%.   3. Indeterminate ECG evidence of ischemia. Abnormal baseline EKG.   4. Myocardial Perfusion SPECT images are abnormal.   5. Medium size, moderate to severe defect in the basal inferior, basal inferolateral and mid inferolateral wall that is fixed.   6. Rest gated analysis showed overall normal left ventricular function with normal LV size.   7. Wall motion was abnormal.   8. Gated study showed an exercise EF of 55%.   9. Post-stress analysis showed no change in LV function.    Cardiac Cath Lab (10.29.09 @ 13:59) >  Ventricles: No LV gram was performed during this study; however, a prior LV gram demonstrated an EF of 60 %.  Coronary vessels: The coronary circulation is right dominant.  LM:      LM: Normal.  LAD:      Proximal LAD: Angiography showed mild atherosclerosis with no flow limiting lesions. Mid LAD: Angiography showed mild atherosclerosis with no flow limiting lesions. Patent stent. Distal vessel angiography showed a large sized vessel and minor luminal irregularities.  CX:      Proximal circumflex: Angiography showed mild atherosclerosis with no flow limiting lesions. OM2: Angiography showed minor luminal irregularities with no flow limiting lesions. Patent stent. Distal vessel angiography showed a large sized vessel.  RCA:      Mid RCA: Angiography showed minor luminal irregularities with no flow limiting lesions. Patent stent. Distal vessel angiography showed a large sized vessel and mild diffuse disease.    Allergies:   codeine (Vomiting; Nausea)      MEDICATIONS  (STANDING):  aspirin enteric coated 81 milliGRAM(s) Oral daily  clopidogrel Tablet 75 milliGRAM(s) Oral daily  lisinopril 10 milliGRAM(s) Oral daily  simvastatin 10 milliGRAM(s) Oral at bedtime  sodium chloride 0.9%. 1000 milliLiter(s) (50 mL/Hr) IV Continuous <Continuous>    MEDICATIONS  (PRN):  aluminum hydroxide/magnesium hydroxide/simethicone Suspension 30 milliLiter(s) Oral every 4 hours PRN Dyspepsia  ondansetron Injectable 4 milliGRAM(s) IV Push every 6 hours PRN Nausea      ROS:     Detailed ten system ROS was performed and was negative except for history as eluded to above.    no fever  no chills  no nausea  no vomiting  no diarrhea  no constipation  no melena  no hematochezia  no chest pain  no palpitations  no sob at rest  no dyspnea on exertion  no cough  no wheezing  no anorexia  no headache  no dizziness - resolved   no syncope  no weakness  no myalgia  no dysuria  no polyuria  no hematuria       Vital Signs Last 24 Hrs  T(C): 35.9 (28 Jul 2020 09:40), Max: 36.8 (27 Jul 2020 13:00)  T(F): 96.6 (28 Jul 2020 09:40), Max: 98.2 (27 Jul 2020 13:00)  HR: 48 (28 Jul 2020 08:00) (46 - 61)  BP: 170/61 (28 Jul 2020 08:00) (108/75 - 176/72)  BP(mean): 90 (28 Jul 2020 08:00) (78 - 99)  RR: 18 (28 Jul 2020 08:00) (10 - 21)  SpO2: 99% (28 Jul 2020 08:00) (97% - 99%)    I&O's Summary    27 Jul 2020 07:01  -  28 Jul 2020 07:00  --------------------------------------------------------  IN: 1500 mL / OUT: 925 mL / NET: 575 mL    28 Jul 2020 07:01  -  28 Jul 2020 09:50  --------------------------------------------------------  IN: 0 mL / OUT: 150 mL / NET: -150 mL        PHYSICAL EXAM:    General:                Comfortable, AAO X 3, in no distress.  HEENT:                  Atraumatic, PERRLA, EOMI, conjunctiva clear.    Neck:                     Supple, no adenopathy, no thyromegaly, no JVD, no bruit.  Back:                     Symmetric, non tender.  Chest:                    Clear, B/L symmetric air entry, no tachypnea  Heart:                     S1, S2 normal, no gallop, no murmur.  Abdomen:              Soft, non-tender, bowel sounds active. No palpable masses.  Extremities:           no cyanosis, no edema. Peripheral pulses normal.  Skin:                      Skin color, texture normal. No rashes.  Neurologic:            Grossly nonfocal.       TELEMETRY:  Sinus bradycardia. No significant pauses.  Rare PACs noted and rare PVCs rarely in couplets.    ECG:  Sinus bradycardia. No AV block      LABS:                        12.7   8.44  )-----------( 183      ( 26 Jul 2020 13:34 )             37.8     07-26    141  |  108  |  17  ----------------------------<  93  4.0   |  27  |  0.94    Ca    8.8      26 Jul 2020 13:34  Mg     2.3     07-26    TPro  6.7  /  Alb  3.2<L>  /  TBili  0.4  /  DBili  x   /  AST  14<L>  /  ALT  18  /  AlkPhos  58  07-26

## 2020-07-28 NOTE — PROCEDURE NOTE - ADDITIONAL PROCEDURE DETAILS
s/p right chest dual chamber PPM insertion  follow post op orders and continue same abx given byCCU team  pressure dressing overnight  activate remote monitoring

## 2020-07-28 NOTE — DISCHARGE NOTE PROVIDER - NSDCMRMEDTOKEN_GEN_ALL_CORE_FT
Aspir 81 oral delayed release tablet: 1 tab(s) orally once a day  lisinopril 10 mg oral tablet: 1 tab(s) orally once a day  Plavix 75 mg oral tablet: 1 tab(s) orally once a day  simvastatin 10 mg oral tablet: 1 tab(s) orally once a day (at bedtime)

## 2020-07-28 NOTE — PHYSICAL THERAPY INITIAL EVALUATION ADULT - PATIENT PROFILE REVIEW, REHAB EVAL
yes/per notes pt wants to go home yes/per notes pt wants to go home; per NA pt was agitated last night and security was called ,became aggressive toward nurse

## 2020-07-28 NOTE — PHYSICAL THERAPY INITIAL EVALUATION ADULT - DIAGNOSIS, PT EVAL
near syncope/collapse in home, +orthostasis ,sinus bradycardia (?due to Beta Blocker) near syncope/collapse in home, +orthostasis ,sinus bradycardia (?due to Beta Blocker),recent CVA while in [place] for 3 weeks at his time share in [place]) with residual diplopia OD

## 2020-07-28 NOTE — PROGRESS NOTE ADULT - ASSESSMENT
BP is high today  HR better off beta blockers. No significant tachycardia / arrhythmia  Stay off beta blockers for now.  Out pt MCOT for rhythm monitoring  Check for orthostasis  If BP remains high may increase ACEi dose or will chose Norvasc Dizziness - resolved   Sinus bradycardia sec to beta blockers  Orthostatic hypotension - resolved   CAD  HTN  HLD    Suggest:    BP is high today  HR better off beta blockers. No significant tachycardia / arrhythmia  Stay off beta blockers for now.  Out pt MCOT for rhythm monitoring  Check for orthostasis  If BP remains high may increase ACEi dose or will chose Norvasc

## 2020-07-28 NOTE — PHYSICAL THERAPY INITIAL EVALUATION ADULT - PRECAUTIONS/LIMITATIONS, REHAB EVAL
h/o CAD,+ MI s/p stents x3 (2009) to LAD,OM2,RCA; +skin precautions, +Stage II sacral ulcer noted on admission/cardiac precautions/fall precautions h/o CAD,+ MI s/p stents x3 (2009) to LAD,OM2,RCA; +skin precautions, +Stage II sacral ulcer noted on admission/fall precautions/vision precautions/cardiac precautions

## 2020-07-28 NOTE — PROCEDURE NOTE - GENERAL PROCEDURE DETAILS
Successful insertion of dual chamber PPM Matinicus S. from right axillary vein, normal lead testing. Removed the semipermanent TVP.

## 2020-07-28 NOTE — PHYSICAL THERAPY INITIAL EVALUATION ADULT - IMPAIRMENTS FOUND, PT EVAL
arousal, attention, and cognition/gait, locomotion, and balance/aerobic capacity/endurance/muscle strength/posture

## 2020-07-30 DIAGNOSIS — Z85.46 PERSONAL HISTORY OF MALIGNANT NEOPLASM OF PROSTATE: ICD-10-CM

## 2020-07-30 DIAGNOSIS — I95.1 ORTHOSTATIC HYPOTENSION: ICD-10-CM

## 2020-07-30 DIAGNOSIS — I25.2 OLD MYOCARDIAL INFARCTION: ICD-10-CM

## 2020-07-30 DIAGNOSIS — I25.10 ATHEROSCLEROTIC HEART DISEASE OF NATIVE CORONARY ARTERY WITHOUT ANGINA PECTORIS: ICD-10-CM

## 2020-07-30 DIAGNOSIS — E78.5 HYPERLIPIDEMIA, UNSPECIFIED: ICD-10-CM

## 2020-07-30 DIAGNOSIS — Z87.891 PERSONAL HISTORY OF NICOTINE DEPENDENCE: ICD-10-CM

## 2020-07-30 DIAGNOSIS — I45.89 OTHER SPECIFIED CONDUCTION DISORDERS: ICD-10-CM

## 2020-07-30 DIAGNOSIS — T44.7X5A ADVERSE EFFECT OF BETA-ADRENORECEPTOR ANTAGONISTS, INITIAL ENCOUNTER: ICD-10-CM

## 2020-07-30 DIAGNOSIS — R00.1 BRADYCARDIA, UNSPECIFIED: ICD-10-CM

## 2020-07-30 DIAGNOSIS — Z95.5 PRESENCE OF CORONARY ANGIOPLASTY IMPLANT AND GRAFT: ICD-10-CM

## 2021-04-19 ENCOUNTER — APPOINTMENT (OUTPATIENT)
Dept: FAMILY MEDICINE | Facility: CLINIC | Age: 81
End: 2021-04-19
Payer: MEDICARE

## 2021-04-19 ENCOUNTER — NON-APPOINTMENT (OUTPATIENT)
Age: 81
End: 2021-04-19

## 2021-04-19 VITALS
BODY MASS INDEX: 34.36 KG/M2 | SYSTOLIC BLOOD PRESSURE: 130 MMHG | HEIGHT: 70 IN | TEMPERATURE: 97 F | WEIGHT: 240 LBS | DIASTOLIC BLOOD PRESSURE: 70 MMHG | OXYGEN SATURATION: 98 % | HEART RATE: 58 BPM

## 2021-04-19 DIAGNOSIS — Z78.9 OTHER SPECIFIED HEALTH STATUS: ICD-10-CM

## 2021-04-19 DIAGNOSIS — S40.929S: ICD-10-CM

## 2021-04-19 DIAGNOSIS — R21 RASH AND OTHER NONSPECIFIC SKIN ERUPTION: ICD-10-CM

## 2021-04-19 DIAGNOSIS — J06.9 ACUTE UPPER RESPIRATORY INFECTION, UNSPECIFIED: ICD-10-CM

## 2021-04-19 DIAGNOSIS — J01.00 ACUTE MAXILLARY SINUSITIS, UNSPECIFIED: ICD-10-CM

## 2021-04-19 DIAGNOSIS — K21.9 GASTRO-ESOPHAGEAL REFLUX DISEASE W/OUT ESOPHAGITIS: ICD-10-CM

## 2021-04-19 DIAGNOSIS — M79.602 PAIN IN LEFT ARM: ICD-10-CM

## 2021-04-19 DIAGNOSIS — H25.89 OTHER AGE-RELATED CATARACT: ICD-10-CM

## 2021-04-19 DIAGNOSIS — M54.9 DORSALGIA, UNSPECIFIED: ICD-10-CM

## 2021-04-19 DIAGNOSIS — Z12.11 ENCOUNTER FOR SCREENING FOR MALIGNANT NEOPLASM OF COLON: ICD-10-CM

## 2021-04-19 DIAGNOSIS — Z85.46 PERSONAL HISTORY OF MALIGNANT NEOPLASM OF PROSTATE: ICD-10-CM

## 2021-04-19 DIAGNOSIS — Z82.49 FAMILY HISTORY OF ISCHEMIC HEART DISEASE AND OTHER DISEASES OF THE CIRCULATORY SYSTEM: ICD-10-CM

## 2021-04-19 DIAGNOSIS — E78.2 MIXED HYPERLIPIDEMIA: ICD-10-CM

## 2021-04-19 PROCEDURE — 99213 OFFICE O/P EST LOW 20 MIN: CPT

## 2021-04-19 RX ORDER — LISINOPRIL 10 MG/1
10 TABLET ORAL DAILY
Refills: 0 | Status: ACTIVE | COMMUNITY

## 2021-04-19 RX ORDER — ALBUTEROL 90 MCG
90 AEROSOL (GRAM) INHALATION
Refills: 0 | Status: ACTIVE | COMMUNITY

## 2021-04-19 RX ORDER — SIMVASTATIN 10 MG/1
10 TABLET, FILM COATED ORAL
Refills: 0 | Status: ACTIVE | COMMUNITY

## 2021-04-19 RX ORDER — MONTELUKAST 10 MG/1
10 TABLET, FILM COATED ORAL DAILY
Refills: 0 | Status: ACTIVE | COMMUNITY

## 2021-04-19 NOTE — PHYSICAL EXAM
[Normal] : normal rate, regular rhythm, normal S1 and S2 and no murmur heard [de-identified] : decreased range of motion of upper and lower back  [de-identified] : slowed gait

## 2021-04-19 NOTE — HEALTH RISK ASSESSMENT
[0] : 2) Feeling down, depressed, or hopeless: Not at all (0) [] : No [de-identified] : mildly active

## 2021-04-19 NOTE — HISTORY OF PRESENT ILLNESS
[___ Weeks ago] : [unfilled] weeks ago [Episodic] : episodic [Moderate] : moderate [Rest] : rest [Activity] : with activity [Worsening] : worsening [Spouse] : spouse [de-identified] : cervical and lumbar  [FreeTextEntry2] : Pt is s/p CVA in 2020 , pt has weakness of and difficulty of right side , he admits that it the side that is causing discomfort  [FreeTextEntry8] : Patient suffered a CVA in 2020. He has residual right sided weakness . He underwent PT with some improvememt in  muscle function and gait. Patient recently developed worsening right sided back pain . He has been undergoing Chiropractic treatments. However this has not relieved his discomfort . He was advised by his CHiropractor to under go spinal xrays. He and his wife are here today for Xray request .\par Patient  does have an Ortho and Ortho spine specialist , that he has seen in the past.

## 2021-04-19 NOTE — REVIEW OF SYSTEMS
[Joint Stiffness] : joint stiffness [Muscle Pain] : muscle pain [Muscle Weakness] : muscle weakness [Back Pain] : back pain [Unsteady Walk] : ataxia [Negative] : Respiratory

## 2021-04-19 NOTE — PLAN
[FreeTextEntry1] : \par I have provided patient with Request for Xrays of upper and lower spine. I have advised he consult with Ortho SPine once results are reviewed\par Patient and wife agree and will follow up

## 2021-12-29 ENCOUNTER — OUTPATIENT (OUTPATIENT)
Dept: OUTPATIENT SERVICES | Facility: HOSPITAL | Age: 81
LOS: 1 days | End: 2021-12-29
Payer: MEDICARE

## 2021-12-29 DIAGNOSIS — Z20.828 CONTACT WITH AND (SUSPECTED) EXPOSURE TO OTHER VIRAL COMMUNICABLE DISEASES: ICD-10-CM

## 2021-12-29 LAB — SARS-COV-2 RNA SPEC QL NAA+PROBE: SIGNIFICANT CHANGE UP

## 2021-12-29 PROCEDURE — U0003: CPT

## 2021-12-29 PROCEDURE — C9803: CPT

## 2021-12-29 PROCEDURE — U0005: CPT

## 2021-12-30 DIAGNOSIS — Z20.828 CONTACT WITH AND (SUSPECTED) EXPOSURE TO OTHER VIRAL COMMUNICABLE DISEASES: ICD-10-CM

## 2022-01-29 PROBLEM — R73.01 IFG (IMPAIRED FASTING GLUCOSE): Status: ACTIVE | Noted: 2021-04-19

## 2022-01-29 PROBLEM — Z86.73 HISTORY OF CEREBROVASCULAR ACCIDENT: Status: RESOLVED | Noted: 2021-04-19 | Resolved: 2022-01-29

## 2022-02-01 ENCOUNTER — APPOINTMENT (OUTPATIENT)
Dept: FAMILY MEDICINE | Facility: CLINIC | Age: 82
End: 2022-02-01
Payer: MEDICARE

## 2022-02-01 VITALS
WEIGHT: 242 LBS | OXYGEN SATURATION: 97 % | HEIGHT: 70 IN | HEART RATE: 68 BPM | DIASTOLIC BLOOD PRESSURE: 78 MMHG | BODY MASS INDEX: 34.65 KG/M2 | SYSTOLIC BLOOD PRESSURE: 128 MMHG | TEMPERATURE: 97.6 F

## 2022-02-01 DIAGNOSIS — R73.01 IMPAIRED FASTING GLUCOSE: ICD-10-CM

## 2022-02-01 DIAGNOSIS — Z11.59 ENCOUNTER FOR SCREENING FOR OTHER VIRAL DISEASES: ICD-10-CM

## 2022-02-01 DIAGNOSIS — L40.9 PSORIASIS, UNSPECIFIED: ICD-10-CM

## 2022-02-01 DIAGNOSIS — Z86.73 PERSONAL HISTORY OF TRANSIENT ISCHEMIC ATTACK (TIA), AND CEREBRAL INFARCTION W/OUT RESIDUAL DEFICITS: ICD-10-CM

## 2022-02-01 PROCEDURE — 36415 COLL VENOUS BLD VENIPUNCTURE: CPT

## 2022-02-01 PROCEDURE — 99214 OFFICE O/P EST MOD 30 MIN: CPT | Mod: 25

## 2022-02-01 NOTE — HISTORY OF PRESENT ILLNESS
[FreeTextEntry1] : ELENA BROWN is a 81 year old male here for a follow up visit.  [de-identified] : He has a history of coronary artery disease, hypercholesterolemia, hypertension, impaired fasting glucose, GERD, and a CVA. His last physical was 3/4/2020, shortly after his CVA. His last office visit was in 4/2021 for back pain. \par \par He states that he wants to restart Otezla for psoriasis and his dermatologist told him he needed to be tested for hepatitis first. He does not have a list of the lab tests required.

## 2022-02-01 NOTE — PLAN
[FreeTextEntry1] : Will order labs as requested by dermatology and fax to Dr. Stephens when received.\par \par He is travelling to [place] tomorrow but will schedule a physical when he returns. Since he is seeing his cardiologist in April it probably makes sense for him to schedule his physical in the Summer.

## 2022-02-01 NOTE — ASSESSMENT
[FreeTextEntry1] : He is overdue for follow up. He is not fasting today but states that his cardiologist will be checking his fasting labs in April; he was last at the cardiologist's office in November.\par \par His blood pressure is controlled and he states that he is taking all of his medications as prescribed. \par \par We called his dermatologist's office (Dr. Leonor Stephens) and they state that he needs a CBC, CMP, and Quantiferon Gold TB test in order to restart Otezla.

## 2022-02-02 ENCOUNTER — NON-APPOINTMENT (OUTPATIENT)
Age: 82
End: 2022-02-02

## 2022-02-02 LAB
ALBUMIN SERPL ELPH-MCNC: 4.1 G/DL
ALP BLD-CCNC: 71 U/L
ALT SERPL-CCNC: 21 U/L
ANION GAP SERPL CALC-SCNC: 11 MMOL/L
AST SERPL-CCNC: 21 U/L
BASOPHILS # BLD AUTO: 0.07 K/UL
BASOPHILS NFR BLD AUTO: 0.8 %
BILIRUB SERPL-MCNC: 0.4 MG/DL
BUN SERPL-MCNC: 15 MG/DL
CALCIUM SERPL-MCNC: 9.1 MG/DL
CHLORIDE SERPL-SCNC: 105 MMOL/L
CO2 SERPL-SCNC: 26 MMOL/L
CREAT SERPL-MCNC: 0.87 MG/DL
EOSINOPHIL # BLD AUTO: 0.5 K/UL
EOSINOPHIL NFR BLD AUTO: 5.9 %
ESTIMATED AVERAGE GLUCOSE: 140 MG/DL
GLUCOSE SERPL-MCNC: 159 MG/DL
HBA1C MFR BLD HPLC: 6.5 %
HCT VFR BLD CALC: 40.1 %
HGB BLD-MCNC: 12.8 G/DL
IMM GRANULOCYTES NFR BLD AUTO: 0.4 %
LYMPHOCYTES # BLD AUTO: 1.47 K/UL
LYMPHOCYTES NFR BLD AUTO: 17.3 %
MAN DIFF?: NORMAL
MCHC RBC-ENTMCNC: 29.3 PG
MCHC RBC-ENTMCNC: 31.9 GM/DL
MCV RBC AUTO: 91.8 FL
MONOCYTES # BLD AUTO: 0.53 K/UL
MONOCYTES NFR BLD AUTO: 6.2 %
NEUTROPHILS # BLD AUTO: 5.9 K/UL
NEUTROPHILS NFR BLD AUTO: 69.4 %
PLATELET # BLD AUTO: 206 K/UL
POTASSIUM SERPL-SCNC: 3.9 MMOL/L
PROT SERPL-MCNC: 6.8 G/DL
RBC # BLD: 4.37 M/UL
RBC # FLD: 13.2 %
SODIUM SERPL-SCNC: 142 MMOL/L
WBC # FLD AUTO: 8.5 K/UL

## 2022-02-04 LAB
M TB IFN-G BLD-IMP: NEGATIVE
QUANTIFERON TB PLUS MITOGEN MINUS NIL: 0.67 IU/ML
QUANTIFERON TB PLUS NIL: 0.05 IU/ML
QUANTIFERON TB PLUS TB1 MINUS NIL: 0.07 IU/ML
QUANTIFERON TB PLUS TB2 MINUS NIL: -0.01 IU/ML

## 2022-03-08 NOTE — PHYSICAL THERAPY INITIAL EVALUATION ADULT - PERTINENT HX OF CURRENT PROBLEM, REHAB EVAL
near syncope after getting out of bed to use the bathroom,took 3 steps felt dizzy,room spinning sensation and fell to floor ,was unable to get up even with help of wife/son and EMS called Dermal Autograft Text: The defect edges were debeveled with a #15 scalpel blade.  Given the location of the defect, shape of the defect and the proximity to free margins a dermal autograft was deemed most appropriate.  Using a sterile surgical marker, the primary defect shape was transferred to the donor site. The area thus outlined was incised deep to adipose tissue with a #15 scalpel blade.  The harvested graft was then trimmed of adipose and epidermal tissue until only dermis was left.  The skin graft was then placed in the primary defect and oriented appropriately.

## 2022-07-12 ENCOUNTER — APPOINTMENT (OUTPATIENT)
Dept: FAMILY MEDICINE | Facility: CLINIC | Age: 82
End: 2022-07-12

## 2022-07-12 VITALS
BODY MASS INDEX: 34.26 KG/M2 | HEART RATE: 70 BPM | TEMPERATURE: 97 F | WEIGHT: 242 LBS | SYSTOLIC BLOOD PRESSURE: 132 MMHG | OXYGEN SATURATION: 95 % | DIASTOLIC BLOOD PRESSURE: 64 MMHG | HEIGHT: 70.5 IN

## 2022-07-12 PROCEDURE — G0439: CPT

## 2022-07-12 PROCEDURE — 36415 COLL VENOUS BLD VENIPUNCTURE: CPT

## 2022-07-12 RX ORDER — APREMILAST 10-20-30MG
10 & 20 & 30 KIT ORAL
Qty: 55 | Refills: 0 | Status: DISCONTINUED | COMMUNITY
Start: 2022-03-25 | End: 2022-07-12

## 2022-07-12 RX ORDER — LOPERAMIDE HYDROCHLORIDE 2 MG/1
2 CAPSULE ORAL
Qty: 80 | Refills: 0 | Status: ACTIVE | COMMUNITY
Start: 2022-04-27

## 2022-07-12 RX ORDER — CLOPIDOGREL BISULFATE 75 MG/1
75 TABLET, FILM COATED ORAL
Qty: 90 | Refills: 0 | Status: ACTIVE | COMMUNITY
Start: 2022-06-19

## 2022-07-12 NOTE — ASSESSMENT
[FreeTextEntry1] : ELENA BROWN is a 81 year old male here for a physical exam.\par \par He has a history of coronary artery disease, hypercholesterolemia, hypertension, impaired fasting glucose, GERD, and a CVA. He had a visit in 2/2022 because he wanted to restart Otezla for psoriasis and his dermatologist told him he needed blood work first. He had a CBC, CMP, and Quantiferon Gold test at Dr. Stephens’s request. His labs revealed mild anemia, similar to 2019 but his glucose and HgbA1c were both higher, consistent with diabetes. He was advised to work on diet and exercise and return to recheck his labs.\par \par He sees a cardiologist regularly and does not need an EKG today.\par

## 2022-07-12 NOTE — PHYSICAL EXAM
[No Carotid Bruits] : no carotid bruits [Soft] : abdomen soft [Non Tender] : non-tender [No Spinal Tenderness] : no spinal tenderness [Grossly Normal Strength/Tone] : grossly normal strength/tone [No Joint Swelling] : no joint swelling [No Rash] : no rash [No Focal Deficits] : no focal deficits [Normal] : normal rate, regular rhythm, normal S1 and S2 and no murmur heard [de-identified] : 1+ BLE edema

## 2022-07-12 NOTE — HISTORY OF PRESENT ILLNESS
[FreeTextEntry1] : ELENA BROWN is a 81 year old male here for a physical exam.\par  [de-identified] : His last physical exam was 3/2020\par \par Vaccines:\par Tetanus is up to date\par Pneumococcal vaccination is up to date\par Shingrix is up to date\par COVID vaccine is up to date\par \par His last dentist visit was less than one year ago\par His last eye doctor appointment was less than one year ago (Dr. Goodson)\par His last dermatologist visit was less than one year ago (Dr. Stephens)\par \par Colon cancer screening is no longer indicated due to age\par \par His diet is healthy overall\par Exercise: rarely

## 2022-07-12 NOTE — REVIEW OF SYSTEMS
[Back Pain] : back pain [Negative] : Heme/Lymph [Lower Ext Edema] : lower extremity edema [FreeTextEntry9] : chronic leg swelling

## 2022-07-12 NOTE — PLAN
[FreeTextEntry1] : Check labs as above. Will adjust any medications based upon lab results.\par \par Reviewed age-appropriate preventive screening tests with patient.\par \par Discussed clean eating (e.g. Mediterranean style diet) and recommendations for regular exercise/staying as physically active as possible.\par \par Reviewed importance of good self care (e.g. meditation, yoga, adequate rest, regular exercise, magnesium, clean eating, etc.).\par \par Follow up for next physical in one year. Follow up sooner for diabetes pending lab results.\par

## 2022-07-14 ENCOUNTER — NON-APPOINTMENT (OUTPATIENT)
Age: 82
End: 2022-07-14

## 2022-07-14 LAB
ALBUMIN SERPL ELPH-MCNC: 4.1 G/DL
ALP BLD-CCNC: 79 U/L
ALT SERPL-CCNC: 13 U/L
ANION GAP SERPL CALC-SCNC: 11 MMOL/L
AST SERPL-CCNC: 14 U/L
BILIRUB SERPL-MCNC: 0.2 MG/DL
BUN SERPL-MCNC: 15 MG/DL
CALCIUM SERPL-MCNC: 8.8 MG/DL
CHLORIDE SERPL-SCNC: 107 MMOL/L
CHOLEST SERPL-MCNC: 120 MG/DL
CO2 SERPL-SCNC: 25 MMOL/L
CREAT SERPL-MCNC: 0.95 MG/DL
CREAT SPEC-SCNC: 271 MG/DL
EGFR: 79 ML/MIN/1.73M2
ESTIMATED AVERAGE GLUCOSE: 146 MG/DL
GLUCOSE SERPL-MCNC: 128 MG/DL
HBA1C MFR BLD HPLC: 6.7 %
HDLC SERPL-MCNC: 38 MG/DL
LDLC SERPL CALC-MCNC: 53 MG/DL
MICROALBUMIN 24H UR DL<=1MG/L-MCNC: 1.8 MG/DL
MICROALBUMIN/CREAT 24H UR-RTO: 6 MG/G
NONHDLC SERPL-MCNC: 82 MG/DL
POTASSIUM SERPL-SCNC: 4.5 MMOL/L
PROT SERPL-MCNC: 6.7 G/DL
SODIUM SERPL-SCNC: 143 MMOL/L
TRIGL SERPL-MCNC: 142 MG/DL

## 2022-09-29 ENCOUNTER — FORM ENCOUNTER (OUTPATIENT)
Age: 82
End: 2022-09-29

## 2022-11-03 ENCOUNTER — TRANSCRIPTION ENCOUNTER (OUTPATIENT)
Age: 82
End: 2022-11-03

## 2022-11-03 ENCOUNTER — EMERGENCY (EMERGENCY)
Facility: HOSPITAL | Age: 82
LOS: 0 days | Discharge: HOME | End: 2022-11-03
Attending: EMERGENCY MEDICINE
Payer: MEDICARE

## 2022-11-03 VITALS
RESPIRATION RATE: 19 BRPM | DIASTOLIC BLOOD PRESSURE: 77 MMHG | TEMPERATURE: 99 F | HEART RATE: 83 BPM | OXYGEN SATURATION: 97 % | SYSTOLIC BLOOD PRESSURE: 106 MMHG

## 2022-11-03 VITALS — HEIGHT: 68 IN | WEIGHT: 179.9 LBS

## 2022-11-03 DIAGNOSIS — R06.02 SHORTNESS OF BREATH: ICD-10-CM

## 2022-11-03 DIAGNOSIS — L40.9 PSORIASIS, UNSPECIFIED: ICD-10-CM

## 2022-11-03 DIAGNOSIS — R05.8 OTHER SPECIFIED COUGH: ICD-10-CM

## 2022-11-03 DIAGNOSIS — U07.1 COVID-19: ICD-10-CM

## 2022-11-03 DIAGNOSIS — C61 MALIGNANT NEOPLASM OF PROSTATE: ICD-10-CM

## 2022-11-03 DIAGNOSIS — R07.9 CHEST PAIN, UNSPECIFIED: ICD-10-CM

## 2022-11-03 DIAGNOSIS — Z79.82 LONG TERM (CURRENT) USE OF ASPIRIN: ICD-10-CM

## 2022-11-03 DIAGNOSIS — Z88.5 ALLERGY STATUS TO NARCOTIC AGENT: ICD-10-CM

## 2022-11-03 DIAGNOSIS — R10.10 UPPER ABDOMINAL PAIN, UNSPECIFIED: ICD-10-CM

## 2022-11-03 DIAGNOSIS — E78.00 PURE HYPERCHOLESTEROLEMIA, UNSPECIFIED: ICD-10-CM

## 2022-11-03 LAB
ALBUMIN SERPL ELPH-MCNC: 3.3 G/DL — SIGNIFICANT CHANGE UP (ref 3.3–5)
ALP SERPL-CCNC: 72 U/L — SIGNIFICANT CHANGE UP (ref 40–120)
ALT FLD-CCNC: 29 U/L — SIGNIFICANT CHANGE UP (ref 12–78)
ANION GAP SERPL CALC-SCNC: 7 MMOL/L — SIGNIFICANT CHANGE UP (ref 5–17)
APTT BLD: 31 SEC — SIGNIFICANT CHANGE UP (ref 27.5–35.5)
AST SERPL-CCNC: 30 U/L — SIGNIFICANT CHANGE UP (ref 15–37)
BASOPHILS # BLD AUTO: 0.08 K/UL — SIGNIFICANT CHANGE UP (ref 0–0.2)
BASOPHILS NFR BLD AUTO: 0.9 % — SIGNIFICANT CHANGE UP (ref 0–2)
BILIRUB SERPL-MCNC: 0.3 MG/DL — SIGNIFICANT CHANGE UP (ref 0.2–1.2)
BUN SERPL-MCNC: 22 MG/DL — SIGNIFICANT CHANGE UP (ref 7–23)
CALCIUM SERPL-MCNC: 9 MG/DL — SIGNIFICANT CHANGE UP (ref 8.5–10.1)
CHLORIDE SERPL-SCNC: 106 MMOL/L — SIGNIFICANT CHANGE UP (ref 96–108)
CO2 SERPL-SCNC: 26 MMOL/L — SIGNIFICANT CHANGE UP (ref 22–31)
CREAT SERPL-MCNC: 1.51 MG/DL — HIGH (ref 0.5–1.3)
D DIMER BLD IA.RAPID-MCNC: 396 NG/ML DDU — HIGH
EGFR: 45 ML/MIN/1.73M2 — LOW
EOSINOPHIL # BLD AUTO: 0.11 K/UL — SIGNIFICANT CHANGE UP (ref 0–0.5)
EOSINOPHIL NFR BLD AUTO: 1.2 % — SIGNIFICANT CHANGE UP (ref 0–6)
GLUCOSE SERPL-MCNC: 124 MG/DL — HIGH (ref 70–99)
HCT VFR BLD CALC: 42.8 % — SIGNIFICANT CHANGE UP (ref 39–50)
HGB BLD-MCNC: 13.8 G/DL — SIGNIFICANT CHANGE UP (ref 13–17)
IMM GRANULOCYTES NFR BLD AUTO: 1 % — HIGH (ref 0–0.9)
INR BLD: 1.28 RATIO — HIGH (ref 0.88–1.16)
LYMPHOCYTES # BLD AUTO: 1.76 K/UL — SIGNIFICANT CHANGE UP (ref 1–3.3)
LYMPHOCYTES # BLD AUTO: 19.4 % — SIGNIFICANT CHANGE UP (ref 13–44)
MCHC RBC-ENTMCNC: 29.4 PG — SIGNIFICANT CHANGE UP (ref 27–34)
MCHC RBC-ENTMCNC: 32.2 GM/DL — SIGNIFICANT CHANGE UP (ref 32–36)
MCV RBC AUTO: 91.1 FL — SIGNIFICANT CHANGE UP (ref 80–100)
MONOCYTES # BLD AUTO: 1.24 K/UL — HIGH (ref 0–0.9)
MONOCYTES NFR BLD AUTO: 13.7 % — SIGNIFICANT CHANGE UP (ref 2–14)
NEUTROPHILS # BLD AUTO: 5.79 K/UL — SIGNIFICANT CHANGE UP (ref 1.8–7.4)
NEUTROPHILS NFR BLD AUTO: 63.8 % — SIGNIFICANT CHANGE UP (ref 43–77)
PLATELET # BLD AUTO: 182 K/UL — SIGNIFICANT CHANGE UP (ref 150–400)
POTASSIUM SERPL-MCNC: 4.5 MMOL/L — SIGNIFICANT CHANGE UP (ref 3.5–5.3)
POTASSIUM SERPL-SCNC: 4.5 MMOL/L — SIGNIFICANT CHANGE UP (ref 3.5–5.3)
PROT SERPL-MCNC: 8 GM/DL — SIGNIFICANT CHANGE UP (ref 6–8.3)
PROTHROM AB SERPL-ACNC: 14.9 SEC — HIGH (ref 10.5–13.4)
RBC # BLD: 4.7 M/UL — SIGNIFICANT CHANGE UP (ref 4.2–5.8)
RBC # FLD: 12.9 % — SIGNIFICANT CHANGE UP (ref 10.3–14.5)
SODIUM SERPL-SCNC: 139 MMOL/L — SIGNIFICANT CHANGE UP (ref 135–145)
TROPONIN I, HIGH SENSITIVITY RESULT: 15.79 NG/L — SIGNIFICANT CHANGE UP
WBC # BLD: 9.07 K/UL — SIGNIFICANT CHANGE UP (ref 3.8–10.5)
WBC # FLD AUTO: 9.07 K/UL — SIGNIFICANT CHANGE UP (ref 3.8–10.5)

## 2022-11-03 PROCEDURE — 99284 EMERGENCY DEPT VISIT MOD MDM: CPT | Mod: FS,CS

## 2022-11-03 PROCEDURE — 71045 X-RAY EXAM CHEST 1 VIEW: CPT

## 2022-11-03 PROCEDURE — 87635 SARS-COV-2 COVID-19 AMP PRB: CPT

## 2022-11-03 PROCEDURE — 99285 EMERGENCY DEPT VISIT HI MDM: CPT | Mod: 25

## 2022-11-03 PROCEDURE — 85379 FIBRIN DEGRADATION QUANT: CPT

## 2022-11-03 PROCEDURE — 36415 COLL VENOUS BLD VENIPUNCTURE: CPT

## 2022-11-03 PROCEDURE — 85025 COMPLETE CBC W/AUTO DIFF WBC: CPT

## 2022-11-03 PROCEDURE — 85610 PROTHROMBIN TIME: CPT

## 2022-11-03 PROCEDURE — 80053 COMPREHEN METABOLIC PANEL: CPT

## 2022-11-03 PROCEDURE — 85730 THROMBOPLASTIN TIME PARTIAL: CPT

## 2022-11-03 PROCEDURE — 84484 ASSAY OF TROPONIN QUANT: CPT

## 2022-11-03 PROCEDURE — 71045 X-RAY EXAM CHEST 1 VIEW: CPT | Mod: 26

## 2022-11-03 NOTE — ED ADULT NURSE NOTE - NSIMPLEMENTINTERV_GEN_ALL_ED
Implemented All Fall with Harm Risk Interventions:  [place] to call system. Call bell, personal items and telephone within reach. Instruct patient to call for assistance. Room bathroom lighting operational. Non-slip footwear when patient is off stretcher. Physically safe environment: no spills, clutter or unnecessary equipment. Stretcher in lowest position, wheels locked, appropriate side rails in place. Provide visual cue, wrist band, yellow gown, etc. Monitor gait and stability. Monitor for mental status changes and reorient to person, place, and time. Review medications for side effects contributing to fall risk. Reinforce activity limits and safety measures with patient and family. Provide visual clues: red socks.

## 2022-11-03 NOTE — ED STATDOCS - CLINICAL SUMMARY MEDICAL DECISION MAKING FREE TEXT BOX
Patient with covid.  Satting well on RA, no respiratory distress.  Age-adjusted dimer low, does not need CTA.  Will sign up for MAB.  D/c home in good condition, return precautions given.

## 2022-11-03 NOTE — ED STATDOCS - OBJECTIVE STATEMENT
82 year old male presents to the ED complaining of being sick with COVID for the past 2-3 days. Pt notes symptoms of dry cough, mild SOB and chest and upper abdominal pain when coughing. Denies fevers, chills, or other symptoms. Pt doesn't believe he had COVID vaccinations. Pt saw PCP who told him to come into ED for further evaluation and possible MAB infusion.

## 2022-11-03 NOTE — ED ADULT TRIAGE NOTE - CHIEF COMPLAINT QUOTE
pt presents to ed for COVID MAB and chest xray  from mds office, pt tested positive yesterday on home test. has no other complaints

## 2022-11-03 NOTE — ED ADULT NURSE NOTE - OBJECTIVE STATEMENT
Pt coming in complaining of exacerbated covid symptoms and SOB. Pt stated he tested positive for covid yesterday and was exposed from, his wife at home who tested positive a few days ago. Today his symptoms have worsened and he is c/o dyspnea on exertion. Pt spO2 100% on room air.

## 2022-11-03 NOTE — ED STATDOCS - PROGRESS NOTE DETAILS
83 y/o M with PMH of HLD, prostate CA presents with SOB, cough x 2-3 days. Pt states he tested positive at home for covid. Also with dry cough. Denies fever. Unsure of vaccination status. Advised by PCp to go to ED. PE: Well appearing. Cardiac: s1s2, RRR. lungs; CTAB. Abdomen: NBS x4, soft, nontender. PV: No LE edema, calf tenderness. A/P: Covid, r/o PE, ACS. Plan for EKG, labs. CXR, reassess. If evaluation negative, will screen for MAB. - Narendra Ward PA-C Labs reviewed with patient, d-dimer WNL when corrected for age. Will screen for MAB and dc home. - Narendra Ward PA-C

## 2022-11-03 NOTE — ED STATDOCS - PATIENT PORTAL LINK FT
You can access the FollowMyHealth Patient Portal offered by [facility] by registering at the following website: [email or web address]. By joining [facility]’s FollowMyHealth portal, you will also be able to view your health information using other applications (apps) compatible with our system. [phone]

## 2022-11-03 NOTE — ED STATDOCS - NSFOLLOWUPINSTRUCTIONS_ED_ALL_ED_FT
COVID-19: Quarantine and Isolation      Quarantine    If you were exposed     Quarantine and stay away from others when you have been in close contact with someone who has COVID-19.      Isolate    If you are sick or test positive     Isolate when you are sick or when you have COVID-19, even if you don't have symptoms.      When to stay home    Calculating quarantine     The date of your exposure is considered day 0. Day 1 is the first full day after your last contact with a person who has had COVID-19. Stay home and away from other people for at least 5 days. Learn why CDC updated guidance for the general public.    IF YOU were exposed to COVID-19 and are NOT    up to date   IF YOU were exposed to COVID-19 and are NOT on COVID-19 vaccinations   • Quarantine for at least 5 days • Stay home   •Stay home and quarantine for at least 5 full days.      •Wear a well-fitting mask if you must be around others in your home.        • Do not travel.     • Get tested   •Even if you don't develop symptoms, get tested at least 5 days after you last had close contact with someone with COVID-19.        • After quarantine • Watch for symptoms   •Watch for symptoms until 10 days after you last had close contact with someone with COVID-19.      • Avoid travel   •It is best to avoid travel until a full 10 days after you last had close contact with someone with COVID-19.      • If you develop symptoms   • Isolate immediately and get tested. Continue to stay home until you know the results. Wear a well-fitting mask around others.        • Take precautions until day 10 • Wear a well-fitting mask   •Wear a well-fitting mask for 10 full days any time you are around others inside your home or in public. Do not go to places where you are unable to wear a well-fitting mask.        • If you must travel during days 6–10, take precautions.       • Avoid being around people who are more likely to get very sick from COVID-19.         IF YOU were exposed to COVID-19 and are    up to date   IF YOU were exposed to COVID-19 and are on COVID-19 vaccinations   • No quarantine   •You do not need to stay home unless you develop symptoms.      • Get tested   •Even if you don't develop symptoms, get tested at least 5 days after you last had close contact with someone with COVID-19.      • Watch for symptoms   •Watch for symptoms until 10 days after you last had close contact with someone with COVID-19.    • If you develop symptoms   • Isolate immediately and get tested. Continue to stay home until you know the results. Wear a well-fitting mask around others.        • Take precautions until day 10 • Wear a well-fitting mask   •Wear a well-fitting mask for 10 full days any time you are around others inside your home or in public. Do not go to places where you are unable to wear a well-fitting mask.        • Take precautions if traveling        • Avoid being around people who are more likely to get very sick from COVID-19.         IF YOU were exposed to COVID-19 and had confirmed COVID-19 within the past 90 days (you tested positive using a viral test)   • No quarantine   •You do not need to stay home unless you develop symptoms.      • Watch for symptoms   •Watch for symptoms until 10 days after you last had close contact with someone with COVID-19.    • If you develop symptoms   • Isolate immediately and get tested. Continue to stay home until you know the results. Wear a well-fitting mask around others.        • Take precautions until day 10 • Wear a well-fitting mask   •Wear a well-fitting mask for 10 full days any time you are around others inside your home or in public. Do not go to places where you are unable to wear a well-fitting mask.        • Take precautions if traveling        • Avoid being around people who are more likely to get very sick from COVID-19.         Calculating isolation     Day 0 is your first day of symptoms or a positive viral test. Day 1 is the first full day after your symptoms developed or your test specimen was collected. If you have COVID-19 or have symptoms, isolate for at least 5 days.    IF YOU tested positive for COVID-19 or have symptoms, regardless of vaccination status   • Stay home for at least 5 days   •Stay home for 5 days and isolate from others in your home.      •Wear a well-fitting mask if you must be around others in your home.      • Do not travel.       • Ending isolation if you had symptoms   • End isolation after 5 full days if you are fever-free for 24 hours (without the use of fever-reducing medication) and your symptoms are improving.      • Ending isolation if you did NOT have symptoms   • End isolation after at least 5 full days after your positive test.      • If you got very sick from COVID-19 or have a weakened immune system   •You should isolate for at least 10 days. Consult your doctor before ending isolation.      • Take precautions until day 10 • Wear a well-fitting mask   •Wear a well-fitting mask for 10 full days any time you are around others inside your home or in public. Do not go to places where you are unable to wear a well-fitting mask.      • Do not travel   • Do not travel until a full 10 days after your symptoms started or the date your positive test was taken if you had no symptoms.        • Avoid being around people who are more likely to get very sick from COVID-19.           Definitions    Exposure     Contact with someone infected with SARS-CoV-2, the virus that causes COVID-19, in a way that increases the likelihood of getting infected with the virus.    Close contact     A close contact is someone who was less than 6 feet away from an infected person (laboratory-confirmed or a clinical diagnosis) for a cumulative total of 15 minutes or more over a 24-hour period. For example, three individual 5-minute exposures for a total of 15 minutes. People who are exposed to someone with COVID-19 after they completed at least 5 days of isolation are not considered close contacts.      Quarantine    Quarantine is a strategy used to prevent transmission of COVID-19 by keeping people who have been in close contact with someone with COVID-19 apart from others.    Who does not need to quarantine?     If you had close contact with someone with COVID-19 and you are in one of the following groups, you do not need to quarantine.  •You are up to date with your COVID-19 vaccines.      •You had confirmed COVID-19 within the last 90 days (meaning you tested positive using a viral test).      If you are up to date with COVID-19 vaccines, you should wear a well-fitting mask around others for 10 days from the date of your last close contact with someone with COVID-19 (the date of last close contact is considered day 0). Get tested at least 5 days after you last had close contact with someone with COVID-19. If you test positive or develop COVID-19 symptoms, isolate from other people and follow recommendations in the Isolation section below. If you tested positive for COVID-19 with a viral test within the previous 90 days and subsequently recovered and remain without COVID-19 symptoms, you do not need to quarantine or get tested after close contact. You should wear a well-fitting mask around others for 10 days from the date of your last close contact with someone with COVID-19 (the date of last close contact is considered day 0). If you have COVID-19 symptoms, get tested and isolate from other people and follow recommendations in the Isolation section below.    Who should quarantine?    If you come into close contact with someone with COVID-19, you should quarantine if you are not up to date on COVID-19 vaccines. This includes people who are not vaccinated.    What to do for quarantine    •Stay home and away from other people for at least 5 days (day 0 through day 5) after your last contact with a person who has COVID-19. The date of your exposure is considered day 0. Wear a well-fitting mask when around others at home, if possible.      •For 10 days after your last close contact with someone with COVID-19, watch for fever (100.4°F or greater), cough, shortness of breath, or other COVID-19 symptoms.      •If you develop symptoms, get tested immediately and isolate until you receive your test results. If you test positive, follow isolation recommendations.    •If you do not develop symptoms, get tested at least 5 days after you last had close contact with someone with COVID-19.  •If you test negative, you can leave your home, but continue to wear a well-fitting mask when around others at home and in public until 10 days after your last close contact with someone with COVID-19.      •If you test positive, you should isolate for at least 5 days from the date of your positive test (if you do not have symptoms). If you do develop COVID-19 symptoms, isolate for at least 5 days from the date your symptoms began (the date the symptoms started is day 0). Follow recommendations in the isolation section below.      •If you are unable to get a test 5 days after last close contact with someone with COVID-19, you can leave your home after day 5 if you have been without COVID-19 symptoms throughout the 5-day period. Wear a well-fitting mask for 10 days after your date of last close contact when around others at home and in public.      •Avoid people who are have weakened immune systems or are more likely to get very sick from COVID-19, and nursing homes and other high-risk settings, until after at least 10 days.        •If possible, stay away from people you live with, especially people who are at higher risk for getting very sick from COVID-19, as well as others outside your home throughout the full 10 days after your last close contact with someone with COVID-19.      •If you are unable to quarantine, you should wear a well-fitting mask for 10 days when around others at home and in public.      •If you are unable to wear a mask when around others, you should continue to quarantine for 10 days. Avoid people who have weakened immune systems or are more likely to get very sick from COVID-19, and nursing homes and other high-risk settings, until after at least 10 days.      •See additional information about travel.      •Do not go to places where you are unable to wear a mask, such as restaurants and some gyms, and avoid eating around others at home and at work until after 10 days after your last close contact with someone with COVID-19.      After quarantine    •Watch for symptoms until 10 days after your last close contact with someone with COVID-19.      •If you have symptoms, isolate immediately and get tested.      Quarantine in high-risk congregate settings    In certain congregate settings that have high risk of secondary transmission (such as correctional and alf facilities, homeless shelters, or cruise ships), CDC recommends a 10-day quarantine for residents, regardless of vaccination and booster status. During periods of critical staffing shortages, facilities may consider shortening the quarantine period for staff to ensure continuity of operations. Decisions to shorten quarantine in these settings should be made in consultation with state, local, [place], or territorial health departments and should take into consideration the context and characteristics of the facility. CDC's setting-specific guidance provides additional recommendations for these settings.      Isolation    Isolation is used to separate people with confirmed or suspected COVID-19 from those without COVID-19. People who are in isolation should stay home until it's safe for them to be around others. At home, anyone sick or infected should separate from others, or wear a well-fitting mask when they need to be around others. People in isolation should stay in a specific "sick room" or area and use a separate bathroom if available. Everyone who has presumed or confirmed COVID-19 should stay home and isolate from other people for at least 5 full days (day 0 is the first day of symptoms or the date of the day of the positive viral test for asymptomatic persons). They should wear a mask when around others at home and in public for an additional 5 days. People who are confirmed to have COVID-19 or are showing symptoms of COVID-19 need to isolate regardless of their vaccination status. This includes:  •People who have a positive viral test for COVID-19, regardless of whether or not they have symptoms.      •People with symptoms of COVID-19, including people who are awaiting test results or have not been tested. People with symptoms should isolate even if they do not know if they have been in close contact with someone with COVID-19.      What to do for isolation    •Monitor your symptoms. If you have an emergency warning sign (including trouble breathing), seek emergency medical care immediately.      •Stay in a separate room from other household members, if possible.      •Use a separate bathroom, if possible.      •Take steps to improve ventilation at home, if possible.      •Avoid contact with other members of the household and pets.      •Don't share personal household items, like cups, towels, and utensils.      •Wear a well-fitting mask when you need to be around other people.      Learn more about what to do if you are sick and how to notify your contacts.    Ending isolation for people who had COVID-19 and had symptoms    If you had COVID-19 and had symptoms, isolate for at least 5 days. To calculate your 5-day isolation period, day 0 is your first day of symptoms. Day 1 is the first full day after your symptoms developed. You can leave isolation after 5 full days.  •You can end isolation after 5 full days if you are fever-free for 24 hours without the use of fever-reducing medication and your other symptoms have improved (Loss of taste and smell may persist for weeks or months after recovery and need not delay the end of isolation).      •You should continue to wear a well-fitting mask around others at home and in public for 5 additional days (day 6 through day 10) after the end of your 5-day isolation period. If you are unable to wear a mask when around others, you should continue to isolate for a full 10 days. Avoid people who have weakened immune systems or are more likely to get very sick from COVID-19, and nursing homes and other high-risk settings, until after at least 10 days.      •If you continue to have fever or your other symptoms have not improved after 5 days of isolation, you should wait to end your isolation until you are fever-free for 24 hours without the use of fever-reducing medication and your other symptoms have improved. Continue to wear a well-fitting mask through day 10. Contact your healthcare provider if you have questions.      •See additional information about travel.      •Do not go to places where you are unable to wear a mask, such as restaurants and some gyms, and avoid eating around others at home and at work until a full 10 days after your first day of symptoms.      If an individual has access to a test and wants to test, the best approach is to use an antigen test1 towards the end of the 5-day isolation period. Collect the test sample only if you are fever-free for 24 hours without the use of fever-reducing medication and your other symptoms have improved (loss of taste and smell may persist for weeks or months after recovery and need not delay the end of isolation). If your test result is positive, you should continue to isolate until day 10. If your test result is negative, you can end isolation, but continue to wear a well-fitting mask around others at home and in public until day 10. Follow additional recommendations for masking and avoiding travel as described above.    1As noted in the labeling for authorized over-the counter antigen tests: Negative results should be treated as presumptive. Negative results do not rule out SARS-CoV-2 infection and should not be used as the sole basis for treatment or patient management decisions, including infection control decisions. To improve results, antigen tests should be used twice over a three-day period with at least 24 hours and no more than 48 hours between tests.    Note that these recommendations on ending isolation do not apply to people who are moderately ill or very sick from COVID-19 or have weakened immune systems. See section below for recommendations for when to end isolation for these groups.    Ending isolation for people who tested positive for COVID-19 but had no symptoms    If you test positive for COVID-19 and never develop symptoms, isolate for at least 5 days. Day 0 is the day of your positive viral test (based on the date you were tested) and day 1 is the first full day after the specimen was collected for your positive test. You can leave isolation after 5 full days.  •If you continue to have no symptoms, you can end isolation after at least 5 days.      •You should continue to wear a well-fitting mask around others at home and in public until day 10 (day 6 through day 10). If you are unable to wear a mask when around others, you should continue to isolate for 10 days. Avoid people who have weakened immune systems or are more likely to get very sick from COVID-19, and nursing homes and other high-risk settings, until after at least 10 days.      •If you develop symptoms after testing positive, your 5-day isolation period should start over. Day 0 is your first day of symptoms. Follow the recommendations above for ending isolation for people who had COVID-19 and had symptoms.      •See additional information about travel.      •Do not go to places where you are unable to wear a mask, such as restaurants and some gyms, and avoid eating around others at home and at work until 10 days after the day of your positive test.      If an individual has access to a test and wants to test, the best approach is to use an antigen test1 towards the end of the 5-day isolation period. If your test result is positive, you should continue to isolate until day 10. If your test result is positive, you can also choose to test daily and if your test result is negative, you can end isolation, but continue to wear a well-fitting mask around others at home and in public until day 10. Follow additional recommendations for masking and avoiding travel as described above.    1As noted in the labeling for authorized over-the counter antigen tests: Negative results should be treated as presumptive. Negative results do not rule out SARS-CoV-2 infection and should not be used as the sole basis for treatment or patient management decisions, including infection control decisions. To improve results, antigen tests should be used twice over a three-day period with at least 24 hours and no more than 48 hours between tests.    Ending isolation for people who were moderately or very sick from COVID-19 or have a weakened immune system    People who are moderately ill from COVID-19 (experiencing symptoms that affect the lungs like shortness of breath or difficulty breathing) should isolate for 10 days and follow all other isolation precautions. To calculate your 10-day isolation period, day 0 is your first day of symptoms. Day 1 is the first full day after your symptoms developed. If you are unsure if your symptoms are moderate, talk to a healthcare provider for further guidance.    People who are very sick from COVID-19 (this means people who were hospitalized or required intensive care or ventilation support) and people who have weakened immune systems might need to isolate at home longer. They may also require testing with a viral test to determine when they can be around others. CDC recommends an isolation period of at least 10 and up to 20 days for people who were very sick from COVID-19 and for people with weakened immune systems. Consult with your healthcare provider about when you can resume being around other people. If you are unsure if your symptoms are severe or if you have a weakened immune system, talk to a healthcare provider for further guidance.    People who have a weakened immune system should talk to their healthcare provider about the potential for reduced immune responses to COVID-19 vaccines and the need to continue to follow current prevention measures (including wearing a well-fitting mask and avoiding crowds and poorly ventilated indoor spaces) to protect themselves against COVID-19 until advised otherwise by their healthcare provider. Close contacts of immunocompromised people—including household members—should also be encouraged to receive all recommended COVID-19 vaccine doses to help protect these people.    Isolation in high-risk congregate settings    In certain high-risk congregate settings that have high risk of secondary transmission and where it is not feasible to cohort people (such as correctional and alf facilities, homeless shelters, and cruise ships), CDC recommends a 10-day isolation period for residents. During periods of critical staffing shortages, facilities may consider shortening the isolation period for staff to ensure continuity of operations. Decisions to shorten isolation in these settings should be made in consultation with state, local, [place], or territorial health departments and should take into consideration the context and characteristics of the facility. CDC's setting-specific guidance provides additional recommendations for these settings.    This CDC guidance is meant to supplement—not replace—any federal, state, local, territorial, or [place] health and safety laws, rules, and regulations.      Recommendations for specific settings    These recommendations do not apply to healthcare professionals. For guidance specific to these settings, see  •Healthcare professionals: Interim Guidance for Managing Healthcare Personnel with SARS-CoV-2 Infection or Exposure to SARS-CoV-2      •Patients, residents, and visitors to healthcare settings: Interim Infection Prevention and Control Recommendations for Healthcare Personnel During the Coronavirus Disease 2019 (COVID-19) Pandemic      Additional setting-specific guidance and recommendations are available.  •These recommendations on quarantine and isolation do apply to - School settings. Additional guidance is available here: Overview of COVID-19 Quarantine for [facility] Schools      •Travelers: Travel information and recommendations      •Congregate facilities and other settings: guidance pages for community, work, and school settings        Ongoing COVID-19 exposure FAQs    I live with someone with COVID-19, but I cannot be  from them. How do we manage quarantine in this situation?     It is very important for people with COVID-19 to remain apart from other people, if possible, even if they are living together. If separation of the person with COVID-19 from others that they live with is not possible, the other people that they live with will have ongoing exposure, meaning they will be repeatedly exposed until that person is no longer able to spread the virus to other people. In this situation, there are precautions you can take to limit the spread of COVID-19:  •The person with COVID-19 and everyone they live with should wear a well-fitting mask inside the home.      •If possible, one person should care for the person with COVID-19 to limit the number of people who are in close contact with the infected person.    •Take steps to protect yourself and others to reduce transmission in the home:  •Quarantine if you are not up to date with your COVID-19 vaccines.      •Isolate if you are sick or tested positive for COVID-19, even if you don't have symptoms.      •Learn more about the public health recommendations for testing, mask use and quarantine of close contacts, like yourself, who have ongoing exposure. These recommendations differ depending on your vaccination status.        What should I do if I have ongoing exposure to COVID-19 from someone I live with?    Recommendations for this situation depend on your vaccination status:    If you are not up to date on COVID-19 vaccines and have ongoing exposure to COVID-19, you should:  •Begin quarantine immediately and continue to quarantine throughout the isolation period of the person with COVID-19.      •Continue to quarantine for an additional 5 days starting the day after the end of isolation for the person with COVID-19.    •Get tested at least 5 days after the end of isolation of the infected person that lives with them.   •If you test negative, you can leave the home but should continue to wear a well-fitting mask when around others at home and in public until 10 days after the end of isolation for the person with COVID-19.         •Isolate immediately if you develop symptoms of COVID-19 or test positive.      If you are up to date with COVID-19 vaccines and have ongoing exposure to COVID-19, you should:  •Get tested at least 5 days after your first exposure. A person with COVID-19 is considered infectious starting 2 days before they develop symptoms, or 2 days before the date of their positive test if they do not have symptoms.      •Get tested again at least 5 days after the end of isolation for the person with COVID-19.      •Wear a well-fitting mask when you are around the person with COVID-19, and do this throughout their isolation period.      •Wear a well-fitting mask around others for 10 days after the infected person's isolation period ends.      Isolate immediately if you develop symptoms of COVID-19 or test positive.    What should I do if multiple people I live with test positive for COVID-19 at different times?    Recommendations for this situation depend on your vaccination status:•If you are not up to date with your COVID-19 vaccines, you should:  •Quarantine throughout the isolation period of any infected person that you live with.      •Continue to quarantine until 5 days after the end of isolation date for the most recently infected person that lives with you. For example, if the last day of isolation of the person most recently infected with COVID-19 was June 30, the new 5-day quarantine period starts on July 1.      •Get tested at least 5 days after the end of isolation for the most recently infected person that lives with you.      •Wear a well-fitting mask when you are around any person with COVID-19 while that person is in isolation.      •Wear a well-fitting mask when you are around other people until 10 days after your last close contact.      •Isolate immediately if you develop symptoms of COVID-19 or test positive.      •If you are up to date with your COVID-19 vaccines, you should:  •Get tested at least 5 days after your first exposure. A person with COVID-19 is considered infectious starting 2 days before they developed symptoms, or 2 days before the date of their positive test if they do not have symptoms.      •Get tested again at least 5 days after the end of isolation for the most recently infected person that lives with you.      •Wear a well-fitting mask when you are around any person with COVID-19 while that person is in isolation.      •Wear a well-fitting mask around others for 10 days after the end of isolation for the most recently infected person that lives with you. For example, if the last day of isolation for the person most recently infected with COVID-19 was June 30, the new 10-day period to wear a well-fitting mask indoors in public starts on July 1.      •Isolate immediately if you develop symptoms of COVID-19 or test positive.        I had COVID-19 and completed isolation. Do I have to quarantine or get tested if someone I live with gets COVID-19 shortly after I completed isolation?    No. If you recently completed isolation and someone that lives with you tests positive for the virus that causes COVID-19 shortly after the end of your isolation period, you do not have to quarantine or get tested as long as you do not develop new symptoms. Once all of the people that live together have completed isolation or quarantine, refer to the guidance below for new exposures to COVID-19.•If you had COVID-19 in the previous 90 days and then came into close contact with someone with COVID-19, you do not have to quarantine or get tested if you do not have symptoms. But you should:  •Wear a well-fitting mask indoors in public for 10 days after your last close contact.      •Monitor for COVID-19 symptoms for 10 days from the date of your last close contact.      •Isolate immediately and get tested if symptoms develop.        •If more than 90 days have passed since your recovery from infection, follow CDC's recommendations for close contacts. These recommendations will differ depending on your vaccination status.      03/30/2022    Content source: National Center for Immunization and Respiratory Diseases (NCIRD), Division of Viral Diseases    This information is not intended to replace advice given to

## 2022-11-04 ENCOUNTER — APPOINTMENT (OUTPATIENT)
Dept: DISASTER EMERGENCY | Facility: HOSPITAL | Age: 82
End: 2022-11-04

## 2022-11-04 ENCOUNTER — OUTPATIENT (OUTPATIENT)
Dept: OUTPATIENT SERVICES | Facility: HOSPITAL | Age: 82
LOS: 1 days | End: 2022-11-04

## 2022-11-04 VITALS
WEIGHT: 240.3 LBS | OXYGEN SATURATION: 97 % | HEIGHT: 70.5 IN | SYSTOLIC BLOOD PRESSURE: 146 MMHG | RESPIRATION RATE: 18 BRPM | HEART RATE: 75 BPM | TEMPERATURE: 98 F | DIASTOLIC BLOOD PRESSURE: 66 MMHG

## 2022-11-04 VITALS
TEMPERATURE: 98 F | HEART RATE: 65 BPM | DIASTOLIC BLOOD PRESSURE: 65 MMHG | RESPIRATION RATE: 18 BRPM | SYSTOLIC BLOOD PRESSURE: 114 MMHG | OXYGEN SATURATION: 95 %

## 2022-11-04 DIAGNOSIS — U07.1 COVID-19: ICD-10-CM

## 2022-11-04 RX ORDER — BEBTELOVIMAB 87.5 MG/ML
175 INJECTION, SOLUTION INTRAVENOUS ONCE
Refills: 0 | Status: COMPLETED | OUTPATIENT
Start: 2022-11-04 | End: 2022-11-04

## 2022-11-04 RX ADMIN — BEBTELOVIMAB 175 MILLIGRAM(S): 87.5 INJECTION, SOLUTION INTRAVENOUS at 14:42

## 2022-11-04 NOTE — CHART NOTE - NSCHARTNOTEFT_GEN_A_CORE
CC: Monoclonal Antibody Administration/COVID 19 Positive      History: Patient presents for administration of monoclonal antibody  Patient has been screened and was deemed to be a candidate.    Exposure: Wife COVID +    Symptoms/ Criteria: fever cough headache sore throat    Inclusion Criteria: asthma CVA HTN DM MI age > 80 years    Date of Positive Test: verified by clinical call center     Date of symptom onset: 11/2    Vaccine status: Pfizer x 5  last  10/22    PMHx:  Infection due to severe acute respiratory syndrome coronavirus 2 (SARS-CoV-2)    Prostate Cancer    Hypercholesterolemia    Psoriasis    Abdominal Hernia    s/p Angioplasty with Stent    S/P Angioplasty    s/p Angioplasty with Stent (ICD9 V45.82)    History of Tonsillectomy    S/P Shoulder Surgery    SysAdmin_VisitLink          T(C): 36.6 (11-04-22 @ 14:57), Max: 37.3 (11-03-22 @ 19:45)  HR: 60 (11-04-22 @ 14:57) (60 - 83)  BP: 96/60 (11-04-22 @ 14:57) (96/60 - 146/66)  RR: 18 (11-04-22 @ 14:57) (16 - 19)  SpO2: 96% (11-04-22 @ 14:57) (96% - 100%)      PE:   Appearance: NAD	  HEENT:   Normal oral mucosa.   Lymphatic: No lymphadenopathy  Cardiovascular: Normal S1 S2, No JVD, No murmurs, No edema  Respiratory: Lungs clear to auscultation	  Gastrointestinal:  Soft, Non-tender. No guarding   Skin: warm and dry  Neurologic: Non-focal  Extremities: Normal range of motion.     ASSESSMENT:  Pt is a 82y year old Male with PMH  CVA MI HTN DM  asthma  Covid +  referred to the infusion center for Monoclonal antibody administration  (Bebtelovimab).        PLAN:  - Administration procedure explained to patient   - Consent for monoclonal antibody administration obtained   - Risk & benefits discussed/all questions answered  - Administer Bebtelovimab per protocol  - will observe patient for one hour post administration  and then if stable discharge home with outpt follow up as planned by PMD.        - Patient tolerated treatment well denies complaints of chest pain/SOB/dizziness/ palpitations  - VSS for discharge home  - D/C instructions given/ fact sheet included.  - Patient to follow-up with PCP as needed.

## 2023-01-05 ENCOUNTER — APPOINTMENT (OUTPATIENT)
Dept: FAMILY MEDICINE | Facility: CLINIC | Age: 83
End: 2023-01-05
Payer: MEDICARE

## 2023-01-05 VITALS
OXYGEN SATURATION: 97 % | WEIGHT: 242 LBS | HEART RATE: 73 BPM | DIASTOLIC BLOOD PRESSURE: 58 MMHG | SYSTOLIC BLOOD PRESSURE: 122 MMHG | HEIGHT: 70.5 IN | BODY MASS INDEX: 34.26 KG/M2 | TEMPERATURE: 97.2 F

## 2023-01-05 PROCEDURE — 99214 OFFICE O/P EST MOD 30 MIN: CPT

## 2023-01-05 RX ORDER — APREMILAST 30 MG/1
30 TABLET, FILM COATED ORAL TWICE DAILY
Refills: 0 | Status: ACTIVE | COMMUNITY
Start: 2022-06-21

## 2023-01-05 NOTE — ASSESSMENT
[FreeTextEntry1] : He is here to follow up on coronary artery disease, hypercholesterolemia, hypertension, diabetes, GERD, and a CVA. He is here to repeat his labs from 6 months ago.\par \par Attempted to review his medication list today but neither he nor his wife have an updated listed. He is scheduled to see his cardiologist next month and will get an updated list from her. I also recommended he make a list of the medicine bottles he has at home or bring the actual bottles to his appointments so we can review them.

## 2023-01-05 NOTE — HISTORY OF PRESENT ILLNESS
[Spouse] : spouse [FreeTextEntry1] : ELENA BROWN is a 82 year old male here for a follow up visit.  [de-identified] : He has a history of coronary artery disease, hypercholesterolemia, hypertension, impaired fasting glucose, GERD, and a CVA. His last visit was for his annual physical in 7/2022. At that time his HgbA1c was 6.7 consistent with diabetes. He was advised to cut back on his sugar and carbohydrate intake and return in 3 months to repeat his labs. He admits that he is still eating sweets but states he has cut back on bread.

## 2023-01-05 NOTE — PLAN
[FreeTextEntry1] : Continue all medications as prescribed. Check labs as above. Will adjust any medications based upon lab results.\par \par Reviewed age-appropriate preventive screening tests with patient.\par \par Discussed clean eating (e.g. Mediterranean style diet) and recommendations for regular exercise/staying as physically active as possible.\par \par Reviewed importance of good self care (e.g. meditation, yoga, adequate rest, regular exercise, magnesium, clean eating, etc.).\par \par Follow up in 6 months for annual physical and sooner based upon lab results.

## 2023-01-05 NOTE — HISTORY OF PRESENT ILLNESS
[Spouse] : spouse [FreeTextEntry1] : ELENA BROWN is a 82 year old male here for a follow up visit.  [de-identified] : He has a history of coronary artery disease, hypercholesterolemia, hypertension, impaired fasting glucose, GERD, and a CVA. His last visit was for his annual physical in 7/2022. At that time his HgbA1c was 6.7 consistent with diabetes. He was advised to cut back on his sugar and carbohydrate intake and return in 3 months to repeat his labs. He admits that he is still eating sweets but states he has cut back on bread.

## 2023-01-05 NOTE — PHYSICAL EXAM
[No Carotid Bruits] : no carotid bruits [Soft] : abdomen soft [Non Tender] : non-tender [No Spinal Tenderness] : no spinal tenderness [No Joint Swelling] : no joint swelling [Grossly Normal Strength/Tone] : grossly normal strength/tone [No Rash] : no rash [No Focal Deficits] : no focal deficits [Normal] : normal sclera/conjunctiva, pupils are equal, round and reactive to light and extraocular movements are intact [de-identified] : 1+ BLE edema

## 2023-01-05 NOTE — REVIEW OF SYSTEMS
[Lower Ext Edema] : lower extremity edema [Back Pain] : back pain [Negative] : Heme/Lymph [FreeTextEntry9] : chronic leg swelling

## 2023-01-05 NOTE — HEALTH RISK ASSESSMENT
[No falls in past year] : Patient reported no falls in the past year [0] : 2) Feeling down, depressed, or hopeless: Not at all (0) [PHQ-2 Negative - No further assessment needed] : PHQ-2 Negative - No further assessment needed [[id number]] : 0

## 2023-01-05 NOTE — HEALTH RISK ASSESSMENT
[No falls in past year] : Patient reported no falls in the past year [0] : 2) Feeling down, depressed, or hopeless: Not at all (0) [PHQ-2 Negative - No further assessment needed] : PHQ-2 Negative - No further assessment needed [[id number]] : 0

## 2023-01-05 NOTE — PHYSICAL EXAM
[No Carotid Bruits] : no carotid bruits [Soft] : abdomen soft [Non Tender] : non-tender [No Spinal Tenderness] : no spinal tenderness [No Joint Swelling] : no joint swelling [Grossly Normal Strength/Tone] : grossly normal strength/tone [No Rash] : no rash [No Focal Deficits] : no focal deficits [Normal] : normal sclera/conjunctiva, pupils are equal, round and reactive to light and extraocular movements are intact [de-identified] : 1+ BLE edema

## 2023-01-06 LAB
ALBUMIN SERPL ELPH-MCNC: 3.8 G/DL
ALP BLD-CCNC: 74 U/L
ALT SERPL-CCNC: 16 U/L
ANION GAP SERPL CALC-SCNC: 11 MMOL/L
AST SERPL-CCNC: 13 U/L
BILIRUB SERPL-MCNC: 0.3 MG/DL
BUN SERPL-MCNC: 16 MG/DL
CALCIUM SERPL-MCNC: 9.1 MG/DL
CHLORIDE SERPL-SCNC: 106 MMOL/L
CHOLEST SERPL-MCNC: 111 MG/DL
CO2 SERPL-SCNC: 25 MMOL/L
CREAT SERPL-MCNC: 0.97 MG/DL
EGFR: 77 ML/MIN/1.73M2
ESTIMATED AVERAGE GLUCOSE: 140 MG/DL
GLUCOSE SERPL-MCNC: 123 MG/DL
HBA1C MFR BLD HPLC: 6.5 %
HDLC SERPL-MCNC: 37 MG/DL
LDLC SERPL CALC-MCNC: 48 MG/DL
NONHDLC SERPL-MCNC: 74 MG/DL
POTASSIUM SERPL-SCNC: 4.4 MMOL/L
PROT SERPL-MCNC: 6.6 G/DL
SODIUM SERPL-SCNC: 141 MMOL/L
TRIGL SERPL-MCNC: 129 MG/DL

## 2023-01-11 ENCOUNTER — NON-APPOINTMENT (OUTPATIENT)
Age: 83
End: 2023-01-11

## 2023-03-09 DIAGNOSIS — R26.2 DIFFICULTY IN WALKING, NOT ELSEWHERE CLASSIFIED: ICD-10-CM

## 2023-06-07 ENCOUNTER — FORM ENCOUNTER (OUTPATIENT)
Age: 83
End: 2023-06-07

## 2023-06-12 ENCOUNTER — APPOINTMENT (OUTPATIENT)
Dept: FAMILY MEDICINE | Facility: CLINIC | Age: 83
End: 2023-06-12
Payer: MEDICARE

## 2023-06-12 ENCOUNTER — APPOINTMENT (OUTPATIENT)
Dept: RADIOLOGY | Facility: CLINIC | Age: 83
End: 2023-06-12
Payer: MEDICARE

## 2023-06-12 ENCOUNTER — OUTPATIENT (OUTPATIENT)
Dept: OUTPATIENT SERVICES | Facility: HOSPITAL | Age: 83
LOS: 1 days | End: 2023-06-12
Payer: MEDICARE

## 2023-06-12 VITALS
HEIGHT: 70.5 IN | SYSTOLIC BLOOD PRESSURE: 128 MMHG | TEMPERATURE: 97.9 F | WEIGHT: 242 LBS | HEART RATE: 81 BPM | BODY MASS INDEX: 34.26 KG/M2 | OXYGEN SATURATION: 97 % | DIASTOLIC BLOOD PRESSURE: 78 MMHG

## 2023-06-12 DIAGNOSIS — R09.89 OTHER SPECIFIED SYMPTOMS AND SIGNS INVOLVING THE CIRCULATORY AND RESPIRATORY SYSTEMS: ICD-10-CM

## 2023-06-12 PROCEDURE — 71046 X-RAY EXAM CHEST 2 VIEWS: CPT

## 2023-06-12 PROCEDURE — 99214 OFFICE O/P EST MOD 30 MIN: CPT

## 2023-06-12 PROCEDURE — 71046 X-RAY EXAM CHEST 2 VIEWS: CPT | Mod: 26

## 2023-06-12 NOTE — ASSESSMENT
[FreeTextEntry1] : I Olga Walsh am scribing for the presence of Dr. Pires the following sections HISTORY OF PRESENT ILLNESS, PAST MEDICAL/FAMILY/SOCIAL HISTORY; REVIEW OF SYSTEMS; VITAL SIGNS; PHYSICAL EXAM. \par \par  I personally performed the services described in the documentation, reviewed the documentation recorded by the scribe in my presence and it accurately and completely records my words and actions.\par

## 2023-06-12 NOTE — REVIEW OF SYSTEMS
[Pain] : pain [Nasal Discharge] : nasal discharge [Shortness Of Breath] : shortness of breath [Cough] : cough [Headache] : headache [Negative] : Heme/Lymph

## 2023-06-12 NOTE — HISTORY OF PRESENT ILLNESS
[Spouse] : spouse [FreeTextEntry8] : Acute care visit \par c/o cough\par \par Patient presents with severe chest congestion and coughing. Additional symptoms include nasal discharge, cough, headache, SOB, He went to a walk in clinic last Wednesday and was given Albuterol, Azithromycin, and Benzonatate pearls; his condition has not improved.

## 2023-06-16 ENCOUNTER — NON-APPOINTMENT (OUTPATIENT)
Age: 83
End: 2023-06-16

## 2023-07-14 ENCOUNTER — APPOINTMENT (OUTPATIENT)
Dept: FAMILY MEDICINE | Facility: CLINIC | Age: 83
End: 2023-07-14
Payer: MEDICARE

## 2023-07-14 VITALS
TEMPERATURE: 97 F | OXYGEN SATURATION: 96 % | BODY MASS INDEX: 31 KG/M2 | HEIGHT: 70.5 IN | DIASTOLIC BLOOD PRESSURE: 68 MMHG | SYSTOLIC BLOOD PRESSURE: 122 MMHG | HEART RATE: 74 BPM | WEIGHT: 219 LBS

## 2023-07-14 DIAGNOSIS — R05.9 COUGH, UNSPECIFIED: ICD-10-CM

## 2023-07-14 DIAGNOSIS — Z87.891 PERSONAL HISTORY OF NICOTINE DEPENDENCE: ICD-10-CM

## 2023-07-14 PROCEDURE — 99215 OFFICE O/P EST HI 40 MIN: CPT | Mod: 25

## 2023-07-14 PROCEDURE — 36415 COLL VENOUS BLD VENIPUNCTURE: CPT

## 2023-07-14 RX ORDER — KETOCONAZOLE 20 MG/G
2 CREAM TOPICAL TWICE DAILY
Refills: 0 | Status: COMPLETED | COMMUNITY
End: 2023-07-14

## 2023-07-14 RX ORDER — FLUTICASONE FUROATE, UMECLIDINIUM BROMIDE AND VILANTEROL TRIFENATATE 200; 62.5; 25 UG/1; UG/1; UG/1
POWDER RESPIRATORY (INHALATION)
Refills: 0 | Status: ACTIVE | COMMUNITY

## 2023-07-14 RX ORDER — RANOLAZINE 500 MG/1
500 TABLET, EXTENDED RELEASE ORAL
Refills: 0 | Status: COMPLETED | COMMUNITY
End: 2023-07-14

## 2023-07-14 RX ORDER — AMLODIPINE BESYLATE 5 MG/1
5 TABLET ORAL DAILY
Refills: 0 | Status: COMPLETED | COMMUNITY
End: 2023-07-14

## 2023-07-14 RX ORDER — TAMSULOSIN HYDROCHLORIDE 0.4 MG/1
0.4 CAPSULE ORAL
Refills: 0 | Status: COMPLETED | COMMUNITY
End: 2023-07-14

## 2023-07-14 RX ORDER — ATENOLOL 25 MG/1
25 TABLET ORAL DAILY
Refills: 0 | Status: COMPLETED | COMMUNITY
End: 2023-07-14

## 2023-07-14 RX ORDER — TIOTROPIUM BROMIDE INHALATION SPRAY 1.56 UG/1
1.25 SPRAY, METERED RESPIRATORY (INHALATION)
Qty: 12 | Refills: 0 | Status: COMPLETED | COMMUNITY
Start: 2022-02-02 | End: 2023-07-14

## 2023-07-14 RX ORDER — PREDNISONE 10 MG/1
10 TABLET ORAL
Qty: 20 | Refills: 0 | Status: COMPLETED | COMMUNITY
Start: 2023-06-12 | End: 2023-07-14

## 2023-07-14 RX ORDER — FLUTICASONE FUROATE AND VILANTEROL TRIFENATATE 100; 25 UG/1; UG/1
100-25 POWDER RESPIRATORY (INHALATION)
Refills: 0 | Status: COMPLETED | COMMUNITY
End: 2023-07-14

## 2023-07-14 RX ORDER — HYDROCORTISONE 25 MG/G
2.5 OINTMENT TOPICAL
Qty: 28 | Refills: 0 | Status: COMPLETED | COMMUNITY
Start: 2022-01-07 | End: 2023-07-14

## 2023-07-14 RX ORDER — GABAPENTIN 300 MG/1
300 CAPSULE ORAL
Refills: 0 | Status: COMPLETED | COMMUNITY
End: 2023-07-14

## 2023-07-14 RX ORDER — NYSTATIN 100000 [USP'U]/G
100000 CREAM TOPICAL TWICE DAILY
Refills: 0 | Status: COMPLETED | COMMUNITY
End: 2023-07-14

## 2023-07-14 RX ORDER — MOMETASONE FUROATE 1 MG/G
0.1 CREAM TOPICAL
Qty: 45 | Refills: 0 | Status: COMPLETED | COMMUNITY
Start: 2022-06-28 | End: 2023-07-14

## 2023-07-14 NOTE — HEALTH RISK ASSESSMENT
[No falls in past year] : Patient reported no falls in the past year [0] : 2) Feeling down, depressed, or hopeless: Not at all (0) [PHQ-2 Negative - No further assessment needed] : PHQ-2 Negative - No further assessment needed [Former] : Former [20 or more] : 20 or more [> 15 Years] : > 15 Years [[id number]] : 0

## 2023-07-14 NOTE — PHYSICAL EXAM
[No Carotid Bruits] : no carotid bruits [Soft] : abdomen soft [Non Tender] : non-tender [No Spinal Tenderness] : no spinal tenderness [No Joint Swelling] : no joint swelling [Grossly Normal Strength/Tone] : grossly normal strength/tone [No Rash] : no rash [No Focal Deficits] : no focal deficits [Normal] : affect was normal and insight and judgment were intact [de-identified] : 1+ BLE edema

## 2023-07-14 NOTE — HISTORY OF PRESENT ILLNESS
[Spouse] : spouse [FreeTextEntry1] : ELENA BROWN is a 82 year old male here for a follow up visit.  [de-identified] : He has a history of coronary artery disease, hypercholesterolemia, hypertension, diabetes, GERD, and a CVA. He was seen for his annual physical in 7/2022. At that time his HgbA1c was 6.7 consistent with diabetes (he previously had impaired fasting glucose). He was advised to cut back on his sugar and carbohydrate intake and return in 3 months to repeat his labs. \par \par He had a follow up visit in 1/2023. He admitted that he was still eating sweets but states he had cut back on bread. His fasting glucose and HgbA1c were slightly lower but still consistent with diabetes. He was advised to work harder on diet and exercise and return again in 6 months to recheck his labs.

## 2023-07-14 NOTE — PLAN
[FreeTextEntry1] : Continue all medications as prescribed. Check labs as above. Will adjust any medications based upon lab results.\par \par Reviewed age-appropriate preventive screening tests with patient.\par \par Discussed clean eating (eg Mediterranean style eating plan) and regular exercise/staying as physically active as possible.  Include balance exercises and strength training and core strengthening exercises for bone health and to decrease risk for falls.\par \par Reviewed importance of good self care (e.g. meditation, yoga, adequate rest, regular exercise, magnesium, clean eating, etc.).\par \par Face-to-face time spent with patient, over half in discussion of the above diagnoses and treatment plan: 40 minutes.\par

## 2023-07-14 NOTE — ASSESSMENT
[FreeTextEntry1] : He is here to follow up on coronary artery disease, hypercholesterolemia, hypertension, diabetes, GERD, and a CVA. He is here to repeat his labs from 6 months ago.\par \par I attempted to review his medication list at his last visit but neither he nor his wife had an updated listed. He was scheduled to see his cardiologist the following month and was asked to get an updated list from her. I also recommended he make a list of the medicine bottles he has at home or bring the actual bottles to his appointments so we can review them. \par \par His wife brought a medication list today. We reviewed this and his medication list was updated. \par \par The patient has no complaints but his wife states that he is coughing and "has a lot of phlegm". He was seen in the office last month with a cough and was prescribed Prednisone. He was taking Robitussin but has stopped. He will start this again. He is a former cigarette smoker and his wife states that he has started smoking cigars. We discussed that this could be the cause of his cough and mucus production.\par \par His weight today is 219. It was listed as 242 last month. His wife believes that was not accurate. Whenever he is asked how much he weighs he states 240. It is unclear the last time he was actually weighed on a scale in the office prior to today.

## 2023-07-15 LAB
ALBUMIN SERPL ELPH-MCNC: 3.8 G/DL
ALP BLD-CCNC: 69 U/L
ALT SERPL-CCNC: 12 U/L
ANION GAP SERPL CALC-SCNC: 11 MMOL/L
AST SERPL-CCNC: 16 U/L
BASOPHILS # BLD AUTO: 0.06 K/UL
BASOPHILS NFR BLD AUTO: 0.6 %
BILIRUB SERPL-MCNC: 0.3 MG/DL
BUN SERPL-MCNC: 14 MG/DL
CALCIUM SERPL-MCNC: 8.9 MG/DL
CHLORIDE SERPL-SCNC: 106 MMOL/L
CHOLEST SERPL-MCNC: 103 MG/DL
CO2 SERPL-SCNC: 26 MMOL/L
CREAT SERPL-MCNC: 0.87 MG/DL
CREAT SPEC-SCNC: 248 MG/DL
EGFR: 85 ML/MIN/1.73M2
EOSINOPHIL # BLD AUTO: 0.24 K/UL
EOSINOPHIL NFR BLD AUTO: 2.5 %
ESTIMATED AVERAGE GLUCOSE: 131 MG/DL
GLUCOSE SERPL-MCNC: 101 MG/DL
HBA1C MFR BLD HPLC: 6.2 %
HCT VFR BLD CALC: 41.9 %
HDLC SERPL-MCNC: 42 MG/DL
HGB BLD-MCNC: 13 G/DL
IMM GRANULOCYTES NFR BLD AUTO: 0.8 %
LDLC SERPL CALC-MCNC: 44 MG/DL
LYMPHOCYTES # BLD AUTO: 2.05 K/UL
LYMPHOCYTES NFR BLD AUTO: 21.5 %
MAN DIFF?: NORMAL
MCHC RBC-ENTMCNC: 29.7 PG
MCHC RBC-ENTMCNC: 31 GM/DL
MCV RBC AUTO: 95.7 FL
MICROALBUMIN 24H UR DL<=1MG/L-MCNC: 2 MG/DL
MICROALBUMIN/CREAT 24H UR-RTO: 8 MG/G
MONOCYTES # BLD AUTO: 0.66 K/UL
MONOCYTES NFR BLD AUTO: 6.9 %
NEUTROPHILS # BLD AUTO: 6.43 K/UL
NEUTROPHILS NFR BLD AUTO: 67.7 %
NONHDLC SERPL-MCNC: 61 MG/DL
PLATELET # BLD AUTO: 199 K/UL
POTASSIUM SERPL-SCNC: 4.1 MMOL/L
PROT SERPL-MCNC: 6.3 G/DL
RBC # BLD: 4.38 M/UL
RBC # FLD: 14.2 %
SODIUM SERPL-SCNC: 142 MMOL/L
TRIGL SERPL-MCNC: 81 MG/DL
WBC # FLD AUTO: 9.52 K/UL

## 2023-07-28 ENCOUNTER — NON-APPOINTMENT (OUTPATIENT)
Age: 83
End: 2023-07-28

## 2023-10-12 ENCOUNTER — INPATIENT (INPATIENT)
Facility: HOSPITAL | Age: 83
LOS: 2 days | Discharge: HOME | DRG: 175 | End: 2023-10-15
Attending: INTERNAL MEDICINE | Admitting: INTERNAL MEDICINE
Payer: MEDICARE

## 2023-10-12 VITALS
HEART RATE: 80 BPM | SYSTOLIC BLOOD PRESSURE: 89 MMHG | TEMPERATURE: 98 F | DIASTOLIC BLOOD PRESSURE: 67 MMHG | OXYGEN SATURATION: 87 %

## 2023-10-12 DIAGNOSIS — Z23 ENCOUNTER FOR IMMUNIZATION: ICD-10-CM

## 2023-10-12 DIAGNOSIS — I26.99 OTHER PULMONARY EMBOLISM WITHOUT ACUTE COR PULMONALE: ICD-10-CM

## 2023-10-12 LAB
ADD ON TEST-SPECIMEN IN LAB: SIGNIFICANT CHANGE UP
ADD ON TEST-SPECIMEN IN LAB: SIGNIFICANT CHANGE UP
ALBUMIN SERPL ELPH-MCNC: 2.8 G/DL — LOW (ref 3.3–5)
ALBUMIN SERPL ELPH-MCNC: 2.8 G/DL — LOW (ref 3.3–5)
ALP SERPL-CCNC: 69 U/L — SIGNIFICANT CHANGE UP (ref 40–120)
ALP SERPL-CCNC: 69 U/L — SIGNIFICANT CHANGE UP (ref 40–120)
ALT FLD-CCNC: 18 U/L — SIGNIFICANT CHANGE UP (ref 12–78)
ALT FLD-CCNC: 18 U/L — SIGNIFICANT CHANGE UP (ref 12–78)
ANION GAP SERPL CALC-SCNC: 7 MMOL/L — SIGNIFICANT CHANGE UP (ref 5–17)
ANION GAP SERPL CALC-SCNC: 7 MMOL/L — SIGNIFICANT CHANGE UP (ref 5–17)
APPEARANCE UR: CLEAR — SIGNIFICANT CHANGE UP
APPEARANCE UR: CLEAR — SIGNIFICANT CHANGE UP
APTT BLD: 32.8 SEC — SIGNIFICANT CHANGE UP (ref 24.5–35.6)
APTT BLD: 32.8 SEC — SIGNIFICANT CHANGE UP (ref 24.5–35.6)
APTT BLD: > 200 SEC (ref 24.5–35.6)
AST SERPL-CCNC: 16 U/L — SIGNIFICANT CHANGE UP (ref 15–37)
AST SERPL-CCNC: 16 U/L — SIGNIFICANT CHANGE UP (ref 15–37)
BACTERIA # UR AUTO: NEGATIVE /HPF — SIGNIFICANT CHANGE UP
BASOPHILS # BLD AUTO: 0.08 K/UL — SIGNIFICANT CHANGE UP (ref 0–0.2)
BASOPHILS # BLD AUTO: 0.08 K/UL — SIGNIFICANT CHANGE UP (ref 0–0.2)
BASOPHILS NFR BLD AUTO: 0.7 % — SIGNIFICANT CHANGE UP (ref 0–2)
BASOPHILS NFR BLD AUTO: 0.7 % — SIGNIFICANT CHANGE UP (ref 0–2)
BILIRUB SERPL-MCNC: 0.6 MG/DL — SIGNIFICANT CHANGE UP (ref 0.2–1.2)
BILIRUB SERPL-MCNC: 0.6 MG/DL — SIGNIFICANT CHANGE UP (ref 0.2–1.2)
BILIRUB UR-MCNC: NEGATIVE — SIGNIFICANT CHANGE UP
BILIRUB UR-MCNC: NEGATIVE — SIGNIFICANT CHANGE UP
BUN SERPL-MCNC: 18 MG/DL — SIGNIFICANT CHANGE UP (ref 7–23)
BUN SERPL-MCNC: 18 MG/DL — SIGNIFICANT CHANGE UP (ref 7–23)
CALCIUM SERPL-MCNC: 8.4 MG/DL — LOW (ref 8.5–10.1)
CALCIUM SERPL-MCNC: 8.4 MG/DL — LOW (ref 8.5–10.1)
CHLORIDE SERPL-SCNC: 114 MMOL/L — HIGH (ref 96–108)
CHLORIDE SERPL-SCNC: 114 MMOL/L — HIGH (ref 96–108)
CO2 SERPL-SCNC: 22 MMOL/L — SIGNIFICANT CHANGE UP (ref 22–31)
CO2 SERPL-SCNC: 22 MMOL/L — SIGNIFICANT CHANGE UP (ref 22–31)
COLOR SPEC: YELLOW — SIGNIFICANT CHANGE UP
COLOR SPEC: YELLOW — SIGNIFICANT CHANGE UP
CREAT SERPL-MCNC: 1 MG/DL — SIGNIFICANT CHANGE UP (ref 0.5–1.3)
CREAT SERPL-MCNC: 1 MG/DL — SIGNIFICANT CHANGE UP (ref 0.5–1.3)
DIFF PNL FLD: ABNORMAL
DIFF PNL FLD: ABNORMAL
EGFR: 74 ML/MIN/1.73M2 — SIGNIFICANT CHANGE UP
EGFR: 74 ML/MIN/1.73M2 — SIGNIFICANT CHANGE UP
EOSINOPHIL # BLD AUTO: 0.37 K/UL — SIGNIFICANT CHANGE UP (ref 0–0.5)
EOSINOPHIL # BLD AUTO: 0.37 K/UL — SIGNIFICANT CHANGE UP (ref 0–0.5)
EOSINOPHIL NFR BLD AUTO: 3.4 % — SIGNIFICANT CHANGE UP (ref 0–6)
EOSINOPHIL NFR BLD AUTO: 3.4 % — SIGNIFICANT CHANGE UP (ref 0–6)
GLUCOSE SERPL-MCNC: 170 MG/DL — HIGH (ref 70–99)
GLUCOSE SERPL-MCNC: 170 MG/DL — HIGH (ref 70–99)
GLUCOSE UR QL: 100 MG/DL
GLUCOSE UR QL: 100 MG/DL
HCT VFR BLD CALC: 37.5 % — LOW (ref 39–50)
HCT VFR BLD CALC: 41.9 % — SIGNIFICANT CHANGE UP (ref 39–50)
HCT VFR BLD CALC: 41.9 % — SIGNIFICANT CHANGE UP (ref 39–50)
HGB BLD-MCNC: 12.9 G/DL — LOW (ref 13–17)
HGB BLD-MCNC: 14 G/DL — SIGNIFICANT CHANGE UP (ref 13–17)
HGB BLD-MCNC: 14 G/DL — SIGNIFICANT CHANGE UP (ref 13–17)
IMM GRANULOCYTES NFR BLD AUTO: 0.7 % — SIGNIFICANT CHANGE UP (ref 0–0.9)
IMM GRANULOCYTES NFR BLD AUTO: 0.7 % — SIGNIFICANT CHANGE UP (ref 0–0.9)
INR BLD: 1.19 RATIO — HIGH (ref 0.85–1.18)
INR BLD: 1.2 RATIO — HIGH (ref 0.85–1.18)
KETONES UR-MCNC: ABNORMAL MG/DL
KETONES UR-MCNC: ABNORMAL MG/DL
LACTATE SERPL-SCNC: 3.2 MMOL/L — HIGH (ref 0.7–2)
LACTATE SERPL-SCNC: 4.2 MMOL/L — CRITICAL HIGH (ref 0.7–2)
LEUKOCYTE ESTERASE UR-ACNC: NEGATIVE — SIGNIFICANT CHANGE UP
LEUKOCYTE ESTERASE UR-ACNC: NEGATIVE — SIGNIFICANT CHANGE UP
LYMPHOCYTES # BLD AUTO: 2.25 K/UL — SIGNIFICANT CHANGE UP (ref 1–3.3)
LYMPHOCYTES # BLD AUTO: 2.25 K/UL — SIGNIFICANT CHANGE UP (ref 1–3.3)
LYMPHOCYTES # BLD AUTO: 20.4 % — SIGNIFICANT CHANGE UP (ref 13–44)
LYMPHOCYTES # BLD AUTO: 20.4 % — SIGNIFICANT CHANGE UP (ref 13–44)
MCHC RBC-ENTMCNC: 31.1 PG — SIGNIFICANT CHANGE UP (ref 27–34)
MCHC RBC-ENTMCNC: 33.4 GM/DL — SIGNIFICANT CHANGE UP (ref 32–36)
MCHC RBC-ENTMCNC: 33.4 GM/DL — SIGNIFICANT CHANGE UP (ref 32–36)
MCHC RBC-ENTMCNC: 34.4 GM/DL — SIGNIFICANT CHANGE UP (ref 32–36)
MCV RBC AUTO: 90.4 FL — SIGNIFICANT CHANGE UP (ref 80–100)
MCV RBC AUTO: 93.1 FL — SIGNIFICANT CHANGE UP (ref 80–100)
MCV RBC AUTO: 93.1 FL — SIGNIFICANT CHANGE UP (ref 80–100)
MONOCYTES # BLD AUTO: 0.58 K/UL — SIGNIFICANT CHANGE UP (ref 0–0.9)
MONOCYTES # BLD AUTO: 0.58 K/UL — SIGNIFICANT CHANGE UP (ref 0–0.9)
MONOCYTES NFR BLD AUTO: 5.3 % — SIGNIFICANT CHANGE UP (ref 2–14)
MONOCYTES NFR BLD AUTO: 5.3 % — SIGNIFICANT CHANGE UP (ref 2–14)
NEUTROPHILS # BLD AUTO: 7.65 K/UL — HIGH (ref 1.8–7.4)
NEUTROPHILS # BLD AUTO: 7.65 K/UL — HIGH (ref 1.8–7.4)
NEUTROPHILS NFR BLD AUTO: 69.5 % — SIGNIFICANT CHANGE UP (ref 43–77)
NEUTROPHILS NFR BLD AUTO: 69.5 % — SIGNIFICANT CHANGE UP (ref 43–77)
NITRITE UR-MCNC: NEGATIVE — SIGNIFICANT CHANGE UP
NITRITE UR-MCNC: NEGATIVE — SIGNIFICANT CHANGE UP
NT-PROBNP SERPL-SCNC: 675 PG/ML — HIGH (ref 0–450)
NT-PROBNP SERPL-SCNC: 675 PG/ML — HIGH (ref 0–450)
PH UR: 6 — SIGNIFICANT CHANGE UP (ref 5–8)
PH UR: 6 — SIGNIFICANT CHANGE UP (ref 5–8)
PLATELET # BLD AUTO: 159 K/UL — SIGNIFICANT CHANGE UP (ref 150–400)
PLATELET # BLD AUTO: 173 K/UL — SIGNIFICANT CHANGE UP (ref 150–400)
PLATELET # BLD AUTO: 173 K/UL — SIGNIFICANT CHANGE UP (ref 150–400)
POTASSIUM SERPL-MCNC: 3.3 MMOL/L — LOW (ref 3.5–5.3)
POTASSIUM SERPL-MCNC: 3.3 MMOL/L — LOW (ref 3.5–5.3)
POTASSIUM SERPL-SCNC: 3.3 MMOL/L — LOW (ref 3.5–5.3)
POTASSIUM SERPL-SCNC: 3.3 MMOL/L — LOW (ref 3.5–5.3)
PROT SERPL-MCNC: 6.4 GM/DL — SIGNIFICANT CHANGE UP (ref 6–8.3)
PROT SERPL-MCNC: 6.4 GM/DL — SIGNIFICANT CHANGE UP (ref 6–8.3)
PROT UR-MCNC: 100 MG/DL
PROT UR-MCNC: 100 MG/DL
PROTHROM AB SERPL-ACNC: 13.4 SEC — HIGH (ref 9.5–13)
PROTHROM AB SERPL-ACNC: 13.5 SEC — HIGH (ref 9.5–13)
RBC # BLD: 4.15 M/UL — LOW (ref 4.2–5.8)
RBC # BLD: 4.5 M/UL — SIGNIFICANT CHANGE UP (ref 4.2–5.8)
RBC # BLD: 4.5 M/UL — SIGNIFICANT CHANGE UP (ref 4.2–5.8)
RBC # FLD: 12.9 % — SIGNIFICANT CHANGE UP (ref 10.3–14.5)
RBC # FLD: 12.9 % — SIGNIFICANT CHANGE UP (ref 10.3–14.5)
RBC # FLD: 13.1 % — SIGNIFICANT CHANGE UP (ref 10.3–14.5)
RBC CASTS # UR COMP ASSIST: 5 /HPF — HIGH (ref 0–4)
SODIUM SERPL-SCNC: 143 MMOL/L — SIGNIFICANT CHANGE UP (ref 135–145)
SODIUM SERPL-SCNC: 143 MMOL/L — SIGNIFICANT CHANGE UP (ref 135–145)
SP GR SPEC: >=1.099 (ref 1–1.03)
SP GR SPEC: >=1.099 (ref 1–1.03)
SQUAMOUS # UR AUTO: 2 /HPF — SIGNIFICANT CHANGE UP (ref 0–5)
TROPONIN I, HIGH SENSITIVITY RESULT: 145.72 NG/L — HIGH
TROPONIN I, HIGH SENSITIVITY RESULT: 145.72 NG/L — HIGH
UROBILINOGEN FLD QL: 1 MG/DL — SIGNIFICANT CHANGE UP (ref 0.2–1)
UROBILINOGEN FLD QL: 1 MG/DL — SIGNIFICANT CHANGE UP (ref 0.2–1)
WBC # BLD: 11.01 K/UL — HIGH (ref 3.8–10.5)
WBC # BLD: 11.01 K/UL — HIGH (ref 3.8–10.5)
WBC # BLD: 12.41 K/UL — HIGH (ref 3.8–10.5)
WBC # FLD AUTO: 11.01 K/UL — HIGH (ref 3.8–10.5)
WBC # FLD AUTO: 11.01 K/UL — HIGH (ref 3.8–10.5)
WBC # FLD AUTO: 12.41 K/UL — HIGH (ref 3.8–10.5)
WBC UR QL: 2 /HPF — SIGNIFICANT CHANGE UP (ref 0–5)

## 2023-10-12 PROCEDURE — 80048 BASIC METABOLIC PNL TOTAL CA: CPT

## 2023-10-12 PROCEDURE — 70450 CT HEAD/BRAIN W/O DYE: CPT | Mod: 26,MA

## 2023-10-12 PROCEDURE — 71275 CT ANGIOGRAPHY CHEST: CPT | Mod: 26,MA

## 2023-10-12 PROCEDURE — 93306 TTE W/DOPPLER COMPLETE: CPT

## 2023-10-12 PROCEDURE — 83735 ASSAY OF MAGNESIUM: CPT

## 2023-10-12 PROCEDURE — 85730 THROMBOPLASTIN TIME PARTIAL: CPT

## 2023-10-12 PROCEDURE — 90471 IMMUNIZATION ADMIN: CPT

## 2023-10-12 PROCEDURE — 83880 ASSAY OF NATRIURETIC PEPTIDE: CPT

## 2023-10-12 PROCEDURE — 83605 ASSAY OF LACTIC ACID: CPT

## 2023-10-12 PROCEDURE — 87086 URINE CULTURE/COLONY COUNT: CPT

## 2023-10-12 PROCEDURE — 85027 COMPLETE CBC AUTOMATED: CPT

## 2023-10-12 PROCEDURE — 93005 ELECTROCARDIOGRAM TRACING: CPT

## 2023-10-12 PROCEDURE — 81001 URINALYSIS AUTO W/SCOPE: CPT

## 2023-10-12 PROCEDURE — 93970 EXTREMITY STUDY: CPT | Mod: 26

## 2023-10-12 PROCEDURE — 74177 CT ABD & PELVIS W/CONTRAST: CPT | Mod: 26,MA

## 2023-10-12 PROCEDURE — 93010 ELECTROCARDIOGRAM REPORT: CPT

## 2023-10-12 PROCEDURE — 84100 ASSAY OF PHOSPHORUS: CPT

## 2023-10-12 PROCEDURE — 99291 CRITICAL CARE FIRST HOUR: CPT

## 2023-10-12 PROCEDURE — 90662 IIV NO PRSV INCREASED AG IM: CPT

## 2023-10-12 PROCEDURE — 36415 COLL VENOUS BLD VENIPUNCTURE: CPT

## 2023-10-12 PROCEDURE — 99285 EMERGENCY DEPT VISIT HI MDM: CPT

## 2023-10-12 PROCEDURE — 93970 EXTREMITY STUDY: CPT

## 2023-10-12 PROCEDURE — 84484 ASSAY OF TROPONIN QUANT: CPT

## 2023-10-12 PROCEDURE — 85610 PROTHROMBIN TIME: CPT

## 2023-10-12 RX ORDER — CLOPIDOGREL BISULFATE 75 MG/1
75 TABLET, FILM COATED ORAL DAILY
Refills: 0 | Status: DISCONTINUED | OUTPATIENT
Start: 2023-10-12 | End: 2023-10-15

## 2023-10-12 RX ORDER — NITROGLYCERIN 6.5 MG
1 CAPSULE, EXTENDED RELEASE ORAL
Refills: 0 | DISCHARGE

## 2023-10-12 RX ORDER — HEPARIN SODIUM 5000 [USP'U]/ML
INJECTION INTRAVENOUS; SUBCUTANEOUS
Qty: 25000 | Refills: 0 | Status: DISCONTINUED | OUTPATIENT
Start: 2023-10-12 | End: 2023-10-14

## 2023-10-12 RX ORDER — ASPIRIN/CALCIUM CARB/MAGNESIUM 324 MG
81 TABLET ORAL DAILY
Refills: 0 | Status: DISCONTINUED | OUTPATIENT
Start: 2023-10-12 | End: 2023-10-12

## 2023-10-12 RX ORDER — MONTELUKAST 4 MG/1
1 TABLET, CHEWABLE ORAL
Refills: 0 | DISCHARGE

## 2023-10-12 RX ORDER — HEPARIN SODIUM 5000 [USP'U]/ML
8000 INJECTION INTRAVENOUS; SUBCUTANEOUS ONCE
Refills: 0 | Status: COMPLETED | OUTPATIENT
Start: 2023-10-12 | End: 2023-10-12

## 2023-10-12 RX ORDER — SODIUM CHLORIDE 9 MG/ML
500 INJECTION INTRAMUSCULAR; INTRAVENOUS; SUBCUTANEOUS ONCE
Refills: 0 | Status: COMPLETED | OUTPATIENT
Start: 2023-10-12 | End: 2023-10-12

## 2023-10-12 RX ORDER — APREMILAST 10-20-30MG
1 KIT ORAL
Refills: 0 | DISCHARGE

## 2023-10-12 RX ORDER — INFLUENZA VIRUS VACCINE 15; 15; 15; 15 UG/.5ML; UG/.5ML; UG/.5ML; UG/.5ML
0.7 SUSPENSION INTRAMUSCULAR ONCE
Refills: 0 | Status: COMPLETED | OUTPATIENT
Start: 2023-10-12 | End: 2023-10-15

## 2023-10-12 RX ORDER — SIMVASTATIN 20 MG/1
10 TABLET, FILM COATED ORAL AT BEDTIME
Refills: 0 | Status: DISCONTINUED | OUTPATIENT
Start: 2023-10-12 | End: 2023-10-15

## 2023-10-12 RX ORDER — LOPERAMIDE HCL 2 MG
1 TABLET ORAL
Refills: 0 | DISCHARGE

## 2023-10-12 RX ORDER — HEPARIN SODIUM 5000 [USP'U]/ML
4000 INJECTION INTRAVENOUS; SUBCUTANEOUS EVERY 6 HOURS
Refills: 0 | Status: DISCONTINUED | OUTPATIENT
Start: 2023-10-12 | End: 2023-10-14

## 2023-10-12 RX ORDER — HEPARIN SODIUM 5000 [USP'U]/ML
8000 INJECTION INTRAVENOUS; SUBCUTANEOUS EVERY 6 HOURS
Refills: 0 | Status: DISCONTINUED | OUTPATIENT
Start: 2023-10-12 | End: 2023-10-14

## 2023-10-12 RX ORDER — NOREPINEPHRINE BITARTRATE/D5W 8 MG/250ML
0.05 PLASTIC BAG, INJECTION (ML) INTRAVENOUS
Qty: 8 | Refills: 0 | Status: DISCONTINUED | OUTPATIENT
Start: 2023-10-12 | End: 2023-10-13

## 2023-10-12 RX ORDER — SODIUM CHLORIDE 9 MG/ML
1000 INJECTION, SOLUTION INTRAVENOUS ONCE
Refills: 0 | Status: COMPLETED | OUTPATIENT
Start: 2023-10-12 | End: 2023-10-12

## 2023-10-12 RX ADMIN — SODIUM CHLORIDE 500 MILLILITER(S): 9 INJECTION INTRAMUSCULAR; INTRAVENOUS; SUBCUTANEOUS at 10:45

## 2023-10-12 RX ADMIN — SODIUM CHLORIDE 1000 MILLILITER(S): 9 INJECTION, SOLUTION INTRAVENOUS at 13:52

## 2023-10-12 RX ADMIN — SIMVASTATIN 10 MILLIGRAM(S): 20 TABLET, FILM COATED ORAL at 22:35

## 2023-10-12 RX ADMIN — HEPARIN SODIUM 1800 UNIT(S)/HR: 5000 INJECTION INTRAVENOUS; SUBCUTANEOUS at 14:50

## 2023-10-12 RX ADMIN — CLOPIDOGREL BISULFATE 75 MILLIGRAM(S): 75 TABLET, FILM COATED ORAL at 14:55

## 2023-10-12 RX ADMIN — Medication 7.5 MICROGRAM(S)/KG/MIN: at 12:32

## 2023-10-12 RX ADMIN — HEPARIN SODIUM 8000 UNIT(S): 5000 INJECTION INTRAVENOUS; SUBCUTANEOUS at 14:54

## 2023-10-12 RX ADMIN — HEPARIN SODIUM 0 UNIT(S)/HR: 5000 INJECTION INTRAVENOUS; SUBCUTANEOUS at 21:32

## 2023-10-12 RX ADMIN — HEPARIN SODIUM 1500 UNIT(S)/HR: 5000 INJECTION INTRAVENOUS; SUBCUTANEOUS at 22:34

## 2023-10-12 RX ADMIN — HEPARIN SODIUM 1800 UNIT(S)/HR: 5000 INJECTION INTRAVENOUS; SUBCUTANEOUS at 20:00

## 2023-10-12 NOTE — CONSULT NOTE ADULT - SUBJECTIVE AND OBJECTIVE BOX
83y old  Male who presents with syncope, shock.  Hypoxic at RA. Diaphoretic.  Diagnosed with b/l PE.  Chronic b/l LE edema, no leg pain.     Currently, comfortable. BP better, on minimal Levophed. Breathing is better. O2 sat is better and on minimal O2 by nasal cannula.    PAST MEDICAL & SURGICAL HISTORY:  Prostate Cancer treated with radiation only  Hypercholesterolemia  s/p Angioplasty with Stent to LAD and OM2 9/14/09  HTN (hypertension)  HLD (hyperlipidemia)  COPD (chronic obstructive pulmonary disease)  CAD (coronary artery disease)  Cerebellar infarct  Myocardial infarction  History of Tonsillectomy  S/P Shoulder Surgery right      PREVIOUS CARDIAC WORKUP:      Echo:  10/12   Dilated RV. PA pressure not calculated, no TR jet seen      ALLERGIES:    codeine (Vomiting; Nausea)       MEDICATIONS  (STANDING):  clopidogrel Tablet 75 milliGRAM(s) Oral daily  heparin  Infusion.  Unit(s)/Hr (18 mL/Hr) IV Continuous <Continuous>  influenza  Vaccine (HIGH DOSE) 0.7 milliLiter(s) IntraMuscular once  norepinephrine Infusion 0.05 MICROgram(s)/kG/Min (7.5 mL/Hr) IV Continuous <Continuous>  potassium chloride    Tablet ER 40 milliEquivalent(s) Oral once  simvastatin 10 milliGRAM(s) Oral at bedtime    MEDICATIONS  (PRN):  heparin   Injectable 4000 Unit(s) IV Push every 6 hours PRN For aPTT between 40 - 57  heparin   Injectable 8000 Unit(s) IV Push every 6 hours PRN For aPTT less than 40      FAMILY HISTORY:  [place]        SOCIAL HISTORY:  .cigar smoker    ROS:     Detailed ten system ROS was performed and was negative except for history as eluded to above.    no fever  no chills  no nausea  no vomiting  no diarrhea  no constipation  no melena  no hematochezia  no chest pain  no palpitations  + sob at rest  + dyspnea on exertion  no cough  no wheezing  no anorexia  no headache  no dizziness  no syncope  + weakness  no myalgia  no dysuria  no polyuria  no hematuria     Vital Signs Last 24 Hrs  T(C): 37.1 (12 Oct 2023 13:30), Max: 37.1 (12 Oct 2023 13:30)  T(F): 98.7 (12 Oct 2023 13:30), Max: 98.7 (12 Oct 2023 13:30)  HR: 89 (12 Oct 2023 15:00) (80 - 93)  BP: 106/78 (12 Oct 2023 15:00) (72/55 - 134/84)  BP(mean): 88 (12 Oct 2023 15:00) (61 - 99)  RR: 27 (12 Oct 2023 15:00) (18 - 27)  SpO2: 95% (12 Oct 2023 15:00) (87% - 96%)    O2 Parameters below as of 12 Oct 2023 13:30  Patient On (Oxygen Delivery Method): nasal cannula  O2 Flow (L/min): 3        PHYSICAL EXAM:    General:                Comfortable, AAO X 3, in no distress.  HEENT:                  Unremarkable.   Neck:                     Supple, no adenopathy, no thyromegaly, no JVD, no bruit.  Chest:                    Clear, B/L symmetric air entry, no tachypnea  Heart:                     S1, S2 normal, + murmur.  Abdomen:              Soft, non-tender, bowel sounds active. No palpable masses.  Extremities:           no cyanosis, no edema.   Neurologic:            Grossly nonfocal.       TELEMETRY:  sinus rhythm    ECG:  NSR, PACs.  Non specific ST changes     LABS:                        14.0   11.01 )-----------( 173      ( 12 Oct 2023 10:36 )             41.9       10-12    143  |  114<H>  |  18  ----------------------------<  170<H>  3.3<L>   |  22  |  1.00    Ca    8.4<L>      12 Oct 2023 11:20    TPro  6.4  /  Alb  2.8<L>  /  TBili  0.6  /  DBili  x   /  AST  16  /  ALT  18  /  AlkPhos  69  10-12    Lactate 3.2           10-12 @ 13:55    Lactate 4.2           10-12 @ 10:36        PT/INR - ( 12 Oct 2023 13:55 )   PT: 13.5 sec;   INR: 1.20 ratio         PTT - ( 12 Oct 2023 13:55 )  PTT:32.8 sec      10-12 @ 11:20  Trop-I  145.72    Pro BNP   10-12 @ 11:20  675      RADIOLOGY & ADDITIONAL STUDIES:    < from: CT Angio Chest PE Protocol w/ IV Cont (10.12.23 @ 12:32) >  IMPRESSION:  Saddle pulmonary embolus extending into the lobar and segmental branches of bilateral pulmonary arteries.  There is CT evidence of right heart strain.  No acute finding within abdomen/pelvis.

## 2023-10-12 NOTE — H&P ADULT - ASSESSMENT
A:    83M  HD # 1  FULL CODE    Here for:    1. Acute hypoxic resp failure 2/2  2. Acute pulmonary embolism  3. Cardiogenic/obstructive shock POA 2/2 #2  3. Hypokalemia  4. CKD  5. NAGMA  6. Lactic acidosis    This patient requires critical care for support of one or more vital organ systems with a high probability of imminent or life threatening deterioration in his/her condition    P:    Large B/L PE with large clot burden causing shock state, resp failure  On pressors    Pending remainder of biomarkers  Pending official read of CTA; RV seems enlarged, large clot burden noted  High risk PE by hemodynamic instability on pressors    This is obstructive/cardiogenic shock, not septic     PERC team called; UFH, +/- systemic thrombolysis, +/- thrombectomy  UFH drip  Stat echo  Vasopressors, titrate as able  IVF  No obv lung pathology on CT chest  DAPT given Hx of PCI  Cr 1.0, trend renal indices  Trend LA, endpoints  Med rec  VTE PUD ppx    Dispo: Accept to critical care. PERC consult. UFH drip. stat echo.     TOTAL CRITICAL CARE TIME: 110 minutes  (EXCLUSIVE of any non bundled procedures)    Note: This time spent INCLUDES time spent directly as this patient's bedside with evaluation, review of chart including review of laboratory and imaging studies, interpretation of vital signs and cardiac output measurements, any necessary ventilator management, and time spent discussing plan of care with patient and family, including goals of care discussion.

## 2023-10-12 NOTE — GOALS OF CARE CONVERSATION - ADVANCED CARE PLANNING - CONVERSATION DETAILS
Discussed MOL[facility], [facility]    Family states they have paperwork at home, have discussed this in the past    They produce digital copy of POA, LW   No MOLST    When I began discussing, everyone thinks they agree on DNR, but unsure  Family thinks that they have MOLST at home, would like to review and bring in   Will review    FULL CODE for now  Will continue to discuss

## 2023-10-12 NOTE — ED PROVIDER NOTE - NSICDXPASTMEDICALHX_GEN_ALL_CORE_FT
PAST MEDICAL HISTORY:  Hypercholesterolemia     Prostate Cancer treated with radiation only    s/p Angioplasty with Stent to LAD and OM2 9/14/09      CAD (coronary artery disease)     Cerebellar infarct     COPD (chronic obstructive pulmonary disease)     HLD (hyperlipidemia)     HTN (hypertension)     Myocardial infarction

## 2023-10-12 NOTE — CONSULT NOTE ADULT - ASSESSMENT
B/L PE  Currently hemodynamics are stable on minimal pressors and FiO2 requirement is low 3LNC  R/o DVT  Elevated BNP and CE from PE    Suggest:    Currently stable, improving.  Treat with anticoagulation  Monitor hemodynamic and pulmonary status, if FIO2 requirement is higher will assess for PE thrombectomy  LE sono to r/o DVT  Continue  current care in ICU  CT venogram not very helpful - no IVC clot reported.   D/W family at bedside  D/W Intensive Care MD Dr JOSY Nance

## 2023-10-12 NOTE — H&P ADULT - HISTORY OF PRESENT ILLNESS
ICU Admission     S:    Pt seen and examined  HD # 1  DNR  PMHx HTN, HLD, CAD s/p stents, MI, COPD not on baseline O2, prostate cancer, cerebellar infarction in 2020 with no residual deficits BIBEMS to the ED c/o diaphoresis, shortness of breath, and hypoxia. Per EMS, pt was found to be hypoxic to 84% on RA improved to 93% on 2L NC O2. Wife at bedside reports pt was fatigued on Sunday however thought nothing of it. This morning wife noted pt appeared to be "gray" and diaphoretic, so called EMS. Wife states pt today looks similar to when he had stroke 3 years ago. Pt with baseline lower extremity swelling and cough, no recent changes. No recent illness or sick contacts. + AC, on Plavix and ASA    10/12 AM: On levophed and supplemental O2. CTA done, pending read, appears to be large PE.

## 2023-10-12 NOTE — H&P ADULT - NSHPLABSRESULTS_GEN_ALL_CORE
LABS:    CBC Full  -  ( 12 Oct 2023 10:36 )  WBC Count : 11.01 K/uL  RBC Count : 4.50 M/uL  Hemoglobin : 14.0 g/dL  Hematocrit : 41.9 %  Platelet Count - Automated : 173 K/uL  Mean Cell Volume : 93.1 fl  Mean Cell Hemoglobin : 31.1 pg  Mean Cell Hemoglobin Concentration : 33.4 gm/dL  Auto Neutrophil # : 7.65 K/uL  Auto Lymphocyte # : 2.25 K/uL  Auto Monocyte # : 0.58 K/uL  Auto Eosinophil # : 0.37 K/uL  Auto Basophil # : 0.08 K/uL  Auto Neutrophil % : 69.5 %  Auto Lymphocyte % : 20.4 %  Auto Monocyte % : 5.3 %  Auto Eosinophil % : 3.4 %  Auto Basophil % : 0.7 %    10-12    143  |  114<H>  |  18  ----------------------------<  170<H>  3.3<L>   |  22  |  1.00    Ca    8.4<L>      12 Oct 2023 11:20    TPro  6.4  /  Alb  2.8<L>  /  TBili  0.6  /  DBili  x   /  AST  16  /  ALT  18  /  AlkPhos  69  10-12    PT/INR - ( 12 Oct 2023 10:36 )   PT: 13.4 sec;   INR: 1.19 ratio         PTT - ( 12 Oct 2023 10:36 )  PTT:32.8 sec  Urinalysis Basic - ( 12 Oct 2023 11:20 )    Color: x / Appearance: x / SG: x / pH: x  Gluc: 170 mg/dL / Ketone: x  / Bili: x / Urobili: x   Blood: x / Protein: x / Nitrite: x   Leuk Esterase: x / RBC: x / WBC x   Sq Epi: x / Non Sq Epi: x / Bacteria: x

## 2023-10-12 NOTE — H&P ADULT - NSICDXPASTMEDICALHX_GEN_ALL_CORE_FT
PAST MEDICAL HISTORY:  CAD (coronary artery disease)     Cerebellar infarct     COPD (chronic obstructive pulmonary disease)     HLD (hyperlipidemia)     HTN (hypertension)     Hypercholesterolemia     Myocardial infarction     Prostate Cancer treated with radiation only    s/p Angioplasty with Stent to LAD and OM2 9/14/09

## 2023-10-12 NOTE — PATIENT PROFILE ADULT - FALL HARM RISK - UNIVERSAL INTERVENTIONS
Bed in lowest position, wheels locked, appropriate side rails in place/Call bell, personal items and telephone in reach/Instruct patient to call for assistance before getting out of bed or chair/Non-slip footwear when patient is out of bed/[place] to call system/Physically safe environment - no spills, clutter or unnecessary equipment/Purposeful Proactive Rounding/Room/bathroom lighting operational, light cord in reach

## 2023-10-12 NOTE — PROGRESS NOTE ADULT - SUBJECTIVE AND OBJECTIVE BOX
Patient is a 83y old  Male who presents with a chief complaint of Syncope,  shock (12 Oct 2023 12:38)    24 hour events:     Allergies    codeine (Vomiting; Nausea)    Intolerances      REVIEW OF SYSTEMS: SEE BELOW       ICU Vital Signs Last 24 Hrs  T(C): 37.1 (12 Oct 2023 13:30), Max: 37.1 (12 Oct 2023 13:30)  T(F): 98.7 (12 Oct 2023 13:30), Max: 98.7 (12 Oct 2023 13:30)  HR: 89 (12 Oct 2023 15:00) (80 - 93)  BP: 106/78 (12 Oct 2023 15:00) (72/55 - 134/84)  BP(mean): 88 (12 Oct 2023 15:00) (61 - 99)  ABP: --  ABP(mean): --  RR: 27 (12 Oct 2023 15:00) (18 - 27)  SpO2: 95% (12 Oct 2023 15:00) (87% - 96%)    O2 Parameters below as of 12 Oct 2023 13:30  Patient On (Oxygen Delivery Method): nasal cannula  O2 Flow (L/min): 3          CAPILLARY BLOOD GLUCOSE      POCT Blood Glucose.: 190 mg/dL (12 Oct 2023 10:17)      I&O's Summary          MEDICATIONS  (STANDING):  clopidogrel Tablet 75 milliGRAM(s) Oral daily  heparin  Infusion.  Unit(s)/Hr (18 mL/Hr) IV Continuous <Continuous>  influenza  Vaccine (HIGH DOSE) 0.7 milliLiter(s) IntraMuscular once  norepinephrine Infusion 0.05 MICROgram(s)/kG/Min (7.5 mL/Hr) IV Continuous <Continuous>  simvastatin 10 milliGRAM(s) Oral at bedtime      MEDICATIONS  (PRN):  heparin   Injectable 4000 Unit(s) IV Push every 6 hours PRN For aPTT between 40 - 57  heparin   Injectable 8000 Unit(s) IV Push every 6 hours PRN For aPTT less than 40      PHYSICAL EXAM: SEE BELOW                          14.0   11.01 )-----------( 173      ( 12 Oct 2023 10:36 )             41.9       10-12    143  |  114<H>  |  18  ----------------------------<  170<H>  3.3<L>   |  22  |  1.00    Ca    8.4<L>      12 Oct 2023 11:20    TPro  6.4  /  Alb  2.8<L>  /  TBili  0.6  /  DBili  x   /  AST  16  /  ALT  18  /  AlkPhos  69  10-12    Lactate 3.2           10-12 @ 13:55    Lactate 4.2           10-12 @ 10:36          PT/INR - ( 12 Oct 2023 13:55 )   PT: 13.5 sec;   INR: 1.20 ratio         PTT - ( 12 Oct 2023 13:55 )  PTT:32.8 sec  Urinalysis Basic - ( 12 Oct 2023 15:30 )    Color: Yellow / Appearance: Clear / SG: >=1.099 / pH: x  Gluc: x / Ketone: Trace mg/dL  / Bili: Negative / Urobili: 1.0 mg/dL   Blood: x / Protein: 100 mg/dL / Nitrite: Negative   Leuk Esterase: Negative / RBC: 5 /HPF / WBC 2 /HPF   Sq Epi: x / Non Sq Epi: 2 /HPF / Bacteria: Negative /HPF

## 2023-10-12 NOTE — ED PROVIDER NOTE - MUSCULOSKELETAL, MLM
Bilateral lower leg swelling that is not pitting. spine appears normal, range of motion is not limited, no muscle or joint tenderness

## 2023-10-12 NOTE — PROGRESS NOTE ADULT - ASSESSMENT
84 y/o male PMH CAD with PCI, systolic CHF, CVA on DAPT, HTN, HLD, daily cigar smoker, sedentary lifestyle, presented with SOB and diaphoresis     Found to have a saddle PE with RV strain   Was on low dose levo for brief period      Plan:     Neuro - AAOx3. Continue plavix     CV, resp - BP improved and now off levo, mentating fine, f/u TTE. IV heparin with bolus, will hold aspirin and continue Plavix to minimize bleeding risk. Plan d/w cardiology - if deteriorates and develops obstructive shock will consider systemic tPA vs intervention. No IVF     GI - start diet     Renal - monitor fn, lytes     ID - no issues     Heme - extensive RLE DVT, iv heparin as above     GOC discussed by CCPA - patient is DNR, family to bring prior paperwork       Patient at high risk for decompensation

## 2023-10-12 NOTE — ED ADULT TRIAGE NOTE - CHIEF COMPLAINT QUOTE
Patient presents to the ER with complaints of shortness of breath, diaphoresis and chronic back pain. Patient a&o x2 at this time. Per EMS, patient 84% on room air, 93% 2L nasal cannula, patient refused IV access by EMS. Blood glucose 180. Hx: COPD, current smoker. RN Anay hensley.

## 2023-10-12 NOTE — ED PROVIDER NOTE - OBJECTIVE STATEMENT
84 y/o male with PMHx of HTN, HLD, CAD s/p stents, MI, COPD not on baseline O2, prostate cancer, cerebellar infarction in 2020 with no residual deficits BIBEMS to the ED c/o diaphoresis, shortness of breath, and hypoxia. Per EMS, pt was found to be hypoxic to 84% on RA improved to 93% on 2L NC O2. Wife at bedside reports pt was fatigued on Sunday however thought nothing of it. This morning wife noted pt appeared to be "gray" and diaphoretic, so called EMS. Wife states pt today looks similar to when he had stroke 3 years ago. Pt with baseline lower extremity swelling and cough, no recent changes. No recent illness or sick contacts. + AC, on Plavix and ASA.

## 2023-10-12 NOTE — ED ADULT NURSE NOTE - NSFALLFACTORS_ED_ALL_ED
Patient called back and states she was treated for yeast infection with pills and cream and continues to have white discharge. No fever or odor. Patient was told per chart review that patient does need a appointment for further evaluation if no relief. Patient was in agreement with plan. Appointment was given.    Weakness

## 2023-10-12 NOTE — ED PROVIDER NOTE - CONSTITUTIONAL, MLM
Ill-appearing, awake, alert, oriented to person, place, time/situation and in no apparent distress. normal...

## 2023-10-12 NOTE — ED ADULT NURSE NOTE - OBJECTIVE STATEMENT
Pt presented to the ED c/o SOB and hypoxia. Pt AO x 2. Past medical history of COPD, stroke, and STEMI. Positive anticoagulants. Wife reports pt was experiencing difficulty breathing and appeared "gray and diaphoretic". O2 88 on room air. BP hypotensive with 70s systolic. Right sided weakness from past stroke present. Bilateral lower extremity edema noted.

## 2023-10-12 NOTE — ED PROVIDER NOTE - CLINICAL SUMMARY MEDICAL DECISION MAKING FREE TEXT BOX
Elderly male with cardiac history coming in with near syncopal episode, c/o shortness of breath, hypoxic, and hypotensive. Diffuse abdominal tenderness on exam. Rales on exam. Plan for CT chest/abdomen/pelvis, labs, small fluid bolus to address hypotension if doesn't improve will start levophed, pt will need to be admitted.

## 2023-10-12 NOTE — H&P ADULT - NSHPPHYSICALEXAM_GEN_ALL_CORE
O:    Adult M lying in bed  No JVD trachea midline  On nasal cannula  Normocephalic, atraumatic  S1S2+  CTA B/L  Abd soft NTND  1-2+ B/L LE edema  Awake and alert  Skin pink, warm

## 2023-10-13 LAB
ANION GAP SERPL CALC-SCNC: 6 MMOL/L — SIGNIFICANT CHANGE UP (ref 5–17)
APTT BLD: 154.3 SEC — CRITICAL HIGH (ref 24.5–35.6)
APTT BLD: 69 SEC — HIGH (ref 24.5–35.6)
APTT BLD: 85.4 SEC — HIGH (ref 24.5–35.6)
BUN SERPL-MCNC: 22 MG/DL — SIGNIFICANT CHANGE UP (ref 7–23)
CALCIUM SERPL-MCNC: 8.5 MG/DL — SIGNIFICANT CHANGE UP (ref 8.5–10.1)
CHLORIDE SERPL-SCNC: 111 MMOL/L — HIGH (ref 96–108)
CO2 SERPL-SCNC: 25 MMOL/L — SIGNIFICANT CHANGE UP (ref 22–31)
CREAT SERPL-MCNC: 0.82 MG/DL — SIGNIFICANT CHANGE UP (ref 0.5–1.3)
CULTURE RESULTS: SIGNIFICANT CHANGE UP
EGFR: 86 ML/MIN/1.73M2 — SIGNIFICANT CHANGE UP
GLUCOSE SERPL-MCNC: 127 MG/DL — HIGH (ref 70–99)
HCT VFR BLD CALC: 36.5 % — LOW (ref 39–50)
HGB BLD-MCNC: 12.5 G/DL — LOW (ref 13–17)
LACTATE SERPL-SCNC: 1.1 MMOL/L — SIGNIFICANT CHANGE UP (ref 0.7–2)
MAGNESIUM SERPL-MCNC: 2.4 MG/DL — SIGNIFICANT CHANGE UP (ref 1.6–2.6)
MCHC RBC-ENTMCNC: 31.3 PG — SIGNIFICANT CHANGE UP (ref 27–34)
MCHC RBC-ENTMCNC: 34.2 GM/DL — SIGNIFICANT CHANGE UP (ref 32–36)
MCV RBC AUTO: 91.3 FL — SIGNIFICANT CHANGE UP (ref 80–100)
NT-PROBNP SERPL-SCNC: 3362 PG/ML — HIGH (ref 0–450)
PHOSPHATE SERPL-MCNC: 2.9 MG/DL — SIGNIFICANT CHANGE UP (ref 2.5–4.5)
PLATELET # BLD AUTO: 154 K/UL — SIGNIFICANT CHANGE UP (ref 150–400)
POTASSIUM SERPL-MCNC: 3.7 MMOL/L — SIGNIFICANT CHANGE UP (ref 3.5–5.3)
POTASSIUM SERPL-SCNC: 3.7 MMOL/L — SIGNIFICANT CHANGE UP (ref 3.5–5.3)
RBC # BLD: 4 M/UL — LOW (ref 4.2–5.8)
RBC # FLD: 13.1 % — SIGNIFICANT CHANGE UP (ref 10.3–14.5)
SODIUM SERPL-SCNC: 142 MMOL/L — SIGNIFICANT CHANGE UP (ref 135–145)
SPECIMEN SOURCE: SIGNIFICANT CHANGE UP
TROPONIN I, HIGH SENSITIVITY RESULT: 878.76 NG/L — HIGH
WBC # BLD: 11.87 K/UL — HIGH (ref 3.8–10.5)
WBC # FLD AUTO: 11.87 K/UL — HIGH (ref 3.8–10.5)

## 2023-10-13 PROCEDURE — 99233 SBSQ HOSP IP/OBS HIGH 50: CPT

## 2023-10-13 RX ORDER — POTASSIUM CHLORIDE 20 MEQ
40 PACKET (EA) ORAL ONCE
Refills: 0 | Status: COMPLETED | OUTPATIENT
Start: 2023-10-13 | End: 2023-10-13

## 2023-10-13 RX ADMIN — HEPARIN SODIUM 1200 UNIT(S)/HR: 5000 INJECTION INTRAVENOUS; SUBCUTANEOUS at 06:57

## 2023-10-13 RX ADMIN — HEPARIN SODIUM 1200 UNIT(S)/HR: 5000 INJECTION INTRAVENOUS; SUBCUTANEOUS at 20:06

## 2023-10-13 RX ADMIN — HEPARIN SODIUM 1200 UNIT(S)/HR: 5000 INJECTION INTRAVENOUS; SUBCUTANEOUS at 07:35

## 2023-10-13 RX ADMIN — HEPARIN SODIUM 0 UNIT(S)/HR: 5000 INJECTION INTRAVENOUS; SUBCUTANEOUS at 05:52

## 2023-10-13 RX ADMIN — Medication 40 MILLIEQUIVALENT(S): at 11:09

## 2023-10-13 RX ADMIN — CLOPIDOGREL BISULFATE 75 MILLIGRAM(S): 75 TABLET, FILM COATED ORAL at 11:09

## 2023-10-13 RX ADMIN — SIMVASTATIN 10 MILLIGRAM(S): 20 TABLET, FILM COATED ORAL at 21:37

## 2023-10-13 RX ADMIN — HEPARIN SODIUM 1200 UNIT(S)/HR: 5000 INJECTION INTRAVENOUS; SUBCUTANEOUS at 06:55

## 2023-10-13 RX ADMIN — HEPARIN SODIUM 1200 UNIT(S)/HR: 5000 INJECTION INTRAVENOUS; SUBCUTANEOUS at 13:32

## 2023-10-13 RX ADMIN — HEPARIN SODIUM 1200 UNIT(S)/HR: 5000 INJECTION INTRAVENOUS; SUBCUTANEOUS at 21:32

## 2023-10-13 RX ADMIN — HEPARIN SODIUM 1200 UNIT(S)/HR: 5000 INJECTION INTRAVENOUS; SUBCUTANEOUS at 08:51

## 2023-10-13 NOTE — DIETITIAN INITIAL EVALUATION ADULT - PERTINENT LABORATORY DATA
10-13    142  |  111<H>  |  22  ----------------------------<  127<H>  3.7   |  25  |  0.82    Ca    8.5      13 Oct 2023 04:55  Phos  2.9     10-13  Mg     2.4     10-13    TPro  6.4  /  Alb  2.8<L>  /  TBili  0.6  /  DBili  x   /  AST  16  /  ALT  18  /  AlkPhos  69  10-12

## 2023-10-13 NOTE — DIETITIAN INITIAL EVALUATION ADULT - NAME AND PHONE
Mom states that Vidal seems to be doing well and she is wondering if he could be seen by a nephrologist in [place] instead of having to keep going to [place].  Mom states that Dr. Ortiz mentioned it before and she is wondering if the referral can be placed.    Advised Mom that I will talk to Dr. Ortiz and get back to her regarding this referral.    Mom verbalized understanding and thanks.   Allison Russell, MS, RDN, [facility], [phone]  Certified Nutrition

## 2023-10-13 NOTE — PROGRESS NOTE ADULT - ASSESSMENT
B/L PE.  Stable, clinically improving  Right leg DVT, no pain, no phlegmasia    Continue anticoagulation. Continue to monitor hemodynamics and FiO2 requirements.   Concern about large Right leg DVT burden and tenuous PE status - ? Prophylatic IVC  filter  No post thrombotic symptoms in right leg - continue medical management for now, will assess for DVT thrombectomy if RLE pain

## 2023-10-13 NOTE — DIETITIAN INITIAL EVALUATION ADULT - PERTINENT MEDS FT
MEDICATIONS  (STANDING):  clopidogrel Tablet 75 milliGRAM(s) Oral daily  heparin  Infusion.  Unit(s)/Hr (18 mL/Hr) IV Continuous <Continuous>  influenza  Vaccine (HIGH DOSE) 0.7 milliLiter(s) IntraMuscular once  simvastatin 10 milliGRAM(s) Oral at bedtime    MEDICATIONS  (PRN):  heparin   Injectable 4000 Unit(s) IV Push every 6 hours PRN For aPTT between 40 - 57  heparin   Injectable 8000 Unit(s) IV Push every 6 hours PRN For aPTT less than 40

## 2023-10-13 NOTE — PROGRESS NOTE ADULT - SUBJECTIVE AND OBJECTIVE BOX
83y old  Male who presents with syncope, shock.  Hypoxic at RA. Diaphoretic.  Diagnosed with b/l PE.  Chronic b/l LE edema, no leg pain.  Right Leg DVT on sono.  Started on IV Heparin.  Clinically improved.     Currently stable. No new events. Minimal pressors. Low FiO2 requirement.     PAST MEDICAL & SURGICAL HISTORY:  Prostate Cancer treated with radiation only  Hypercholesterolemia  s/p Angioplasty with Stent to LAD and OM2 9/14/09  HTN (hypertension)  HLD (hyperlipidemia)  COPD (chronic obstructive pulmonary disease)  CAD (coronary artery disease)  Cerebellar infarct  Myocardial infarction  History of Tonsillectomy  S/P Shoulder Surgery right    CURRENT CARDIAC WORKUP:       < from: TTE Echo Complete w/ Contrast w/ Doppler (10.12.23 @ 15:04) >   Mildly reduce left ventricular systolic function.   There are wall motion abnormalitiesnoted, inferolateral hypokinesis.   Estimated left ventricular ejection fraction is 40-45 %.   Mild asymmetrical left ventricular hypertrophy is present.   Definity was administered to enhance LV endocardium.   The right ventricle is mildly dilated.   TAPSE is 1.9 cm.   Aortic valve appears mildly calcified.   The mitral valve leaflets appear thickened.   Mild mitral annular calcification is present.   EA reversal of the mitral inflow consistent with reduced compliance of the left ventricle.   Mild (1+) tricuspid valve regurgitation is present.      Allergies:   codeine (Vomiting; Nausea)      MEDICATIONS  (STANDING):  clopidogrel Tablet 75 milliGRAM(s) Oral daily  heparin  Infusion.  Unit(s)/Hr (18 mL/Hr) IV Continuous <Continuous>  influenza  Vaccine (HIGH DOSE) 0.7 milliLiter(s) IntraMuscular once  norepinephrine Infusion 0.05 MICROgram(s)/kG/Min (7.5 mL/Hr) IV Continuous <Continuous>  potassium chloride    Tablet ER 40 milliEquivalent(s) Oral once  simvastatin 10 milliGRAM(s) Oral at bedtime    MEDICATIONS  (PRN):  heparin   Injectable 4000 Unit(s) IV Push every 6 hours PRN For aPTT between 40 - 57  heparin   Injectable 8000 Unit(s) IV Push every 6 hours PRN For aPTT less than 40      Vital Signs Last 24 Hrs  T(C): 37 (13 Oct 2023 08:00), Max: 37.1 (12 Oct 2023 13:30)  T(F): 98.6 (13 Oct 2023 08:00), Max: 98.7 (12 Oct 2023 13:30)  HR: 86 (13 Oct 2023 09:00) (80 - 103)  BP: 128/91 (13 Oct 2023 09:00) (72/55 - 143/88)  BP(mean): 103 (13 Oct 2023 09:00) (61 - 107)  RR: 26 (13 Oct 2023 09:00) (18 - 27)  SpO2: 92% (13 Oct 2023 09:00) (87% - 98%)    Parameters below as of 13 Oct 2023 09:00  Patient On (Oxygen Delivery Method): nasal cannula  O2 Flow (L/min): 2      I&O's Summary    12 Oct 2023 07:01  -  13 Oct 2023 07:00  --------------------------------------------------------  IN: 1436 mL / OUT: 400 mL / NET: 1036 mL        PHYSICAL EXAM:    General:                Comfortable, AAO X 3, in no distress.  HEENT:                  Unremarkable.   Neck:                     Supple, no adenopathy, no thyromegaly, no JVD, no bruit.  Chest:                    Clear, B/L symmetric air entry, no tachypnea  Heart:                     S1, S2 normal, + murmur.  Abdomen:              Soft, non-tender, bowel sounds active. No palpable masses.  Extremities:           no cyanosis, + edema.   Neurologic:            Grossly nonfocal.       TELEMETRY:  Sinus       LABS:                        12.5   11.87 )-----------( 154      ( 13 Oct 2023 04:55 )             36.5     10-13    142  |  111<H>  |  22  ----------------------------<  127<H>  3.7   |  25  |  0.82    Ca    8.5      13 Oct 2023 04:55  Phos  2.9     10-13  Mg     2.4     10-13    TPro  6.4  /  Alb  2.8<L>  /  TBili  0.6  /  DBili  x   /  AST  16  /  ALT  18  /  AlkPhos  69  10-12      10-13 @ 04:55  Trop-I 878.76    10-12 @ 11:20  Trop-I 145.72      Pro BNP   10-13 @ 04:55  3362  Pro BNP   10-12 @ 11:20  675      PT/INR - ( 12 Oct 2023 13:55 )   PT: 13.5 sec;   INR: 1.20 ratio    PTT - ( 13 Oct 2023 04:55 )  PTT:154.3 sec      RADIOLOGY & ADDITIONAL STUDIES:    < from: US Duplex Venous Lower Ext Complete, Bilateral (10.12.23 @ 16:02) >  IMPRESSION:  Extensive RIGHT lower extremity deep venous thrombosis extending from the   right femoral vein down to the right calf veins.    No evidence for LEFT lower extremity deep venous thrombosis.    < end of copied text >

## 2023-10-13 NOTE — PROGRESS NOTE ADULT - SUBJECTIVE AND OBJECTIVE BOX
Patient is a 83y old  Male who presents with a chief complaint of Syncope,  shock (13 Oct 2023 08:05)    24 hour events:     Allergies    codeine (Vomiting; Nausea)    Intolerances      REVIEW OF SYSTEMS: SEE BELOW       ICU Vital Signs Last 24 Hrs  T(C): 37 (13 Oct 2023 08:00), Max: 37.1 (12 Oct 2023 13:30)  T(F): 98.6 (13 Oct 2023 08:00), Max: 98.7 (12 Oct 2023 13:30)  HR: 92 (13 Oct 2023 11:00) (84 - 103)  BP: 113/76 (13 Oct 2023 11:00) (97/84 - 143/88)  BP(mean): 88 (13 Oct 2023 11:00) (81 - 107)  ABP: --  ABP(mean): --  RR: 19 (13 Oct 2023 11:00) (18 - 27)  SpO2: 95% (13 Oct 2023 11:00) (92% - 98%)    O2 Parameters below as of 13 Oct 2023 11:00  Patient On (Oxygen Delivery Method): nasal cannula  O2 Flow (L/min): 2          CAPILLARY BLOOD GLUCOSE          I&O's Summary    12 Oct 2023 07:01  -  13 Oct 2023 07:00  --------------------------------------------------------  IN: 1436 mL / OUT: 400 mL / NET: 1036 mL            MEDICATIONS  (STANDING):  clopidogrel Tablet 75 milliGRAM(s) Oral daily  heparin  Infusion.  Unit(s)/Hr (18 mL/Hr) IV Continuous <Continuous>  influenza  Vaccine (HIGH DOSE) 0.7 milliLiter(s) IntraMuscular once  simvastatin 10 milliGRAM(s) Oral at bedtime      MEDICATIONS  (PRN):  heparin   Injectable 8000 Unit(s) IV Push every 6 hours PRN For aPTT less than 40  heparin   Injectable 4000 Unit(s) IV Push every 6 hours PRN For aPTT between 40 - 57      PHYSICAL EXAM: SEE BELOW                          12.5   11.87 )-----------( 154      ( 13 Oct 2023 04:55 )             36.5       10-13    142  |  111<H>  |  22  ----------------------------<  127<H>  3.7   |  25  |  0.82    Ca    8.5      13 Oct 2023 04:55  Phos  2.9     10-13  Mg     2.4     10-13    TPro  6.4  /  Alb  2.8<L>  /  TBili  0.6  /  DBili  x   /  AST  16  /  ALT  18  /  AlkPhos  69  10-12    Lactate 1.1           10-13 @ 04:55    Lactate 3.2           10-12 @ 13:55          PT/INR - ( 12 Oct 2023 13:55 )   PT: 13.5 sec;   INR: 1.20 ratio         PTT - ( 13 Oct 2023 04:55 )  PTT:154.3 sec  Urinalysis Basic - ( 13 Oct 2023 04:55 )    Color: x / Appearance: x / SG: x / pH: x  Gluc: 127 mg/dL / Ketone: x  / Bili: x / Urobili: x   Blood: x / Protein: x / Nitrite: x   Leuk Esterase: x / RBC: x / WBC x   Sq Epi: x / Non Sq Epi: x / Bacteria: x

## 2023-10-13 NOTE — DIETITIAN INITIAL EVALUATION ADULT - NSFNSGIIOFT_GEN_A_CORE
I&O's Detail    12 Oct 2023 07:01  -  13 Oct 2023 07:00  --------------------------------------------------------  IN:    Heparin Infusion: 211 mL    Lactated Ringers Bolus: 1000 mL    Norepinephrine: 25 mL    Oral Fluid: 200 mL  Total IN: 1436 mL    OUT:    Voided (mL): 400 mL  Total OUT: 400 mL    Total NET: 1036 mL

## 2023-10-13 NOTE — DIETITIAN INITIAL EVALUATION ADULT - ORAL INTAKE PTA/DIET HISTORY
poor historian, unable to provide detailed diet/ wt hx  States he lives w/ wife, son, and dtrs who cook/ shop for him; endorses poor appetite and wt loss but unsure of time frame/ reason

## 2023-10-13 NOTE — DIETITIAN INITIAL EVALUATION ADULT - ADD RECOMMEND
1) Liberalize diet to regular to maximize caloric and nutrient intake, 2) Premier protein shake BID to optimize nutritional needs (provides 160 kcal, 30 g protein/ shake), 3) Encourage protein-rich foods, maximize food preferences, 4) MVI w/ minerals daily to ensure 100% RDA met, 5) Consider adding thiamine 100 mg daily 2/2 poor PO intake/ malnutrition, 6) Consider adding thiamine 100 mg daily 2/2 poor PO intake/ malnutrition, 7) Consider trial of appetite stimulant such as Remeron or Marinol 2/2 chronically poor appetite/ PO intake, 8) Confirm goals of care regarding nutrition support. RD will continue to monitor PO intake, labs, hydration, and wt prn.

## 2023-10-14 LAB
ANION GAP SERPL CALC-SCNC: 5 MMOL/L — SIGNIFICANT CHANGE UP (ref 5–17)
APTT BLD: 71 SEC — HIGH (ref 24.5–35.6)
BUN SERPL-MCNC: 18 MG/DL — SIGNIFICANT CHANGE UP (ref 7–23)
CALCIUM SERPL-MCNC: 8.1 MG/DL — LOW (ref 8.5–10.1)
CHLORIDE SERPL-SCNC: 110 MMOL/L — HIGH (ref 96–108)
CO2 SERPL-SCNC: 25 MMOL/L — SIGNIFICANT CHANGE UP (ref 22–31)
CREAT SERPL-MCNC: 0.82 MG/DL — SIGNIFICANT CHANGE UP (ref 0.5–1.3)
EGFR: 86 ML/MIN/1.73M2 — SIGNIFICANT CHANGE UP
GLUCOSE SERPL-MCNC: 125 MG/DL — HIGH (ref 70–99)
HCT VFR BLD CALC: 36.2 % — LOW (ref 39–50)
HGB BLD-MCNC: 12.3 G/DL — LOW (ref 13–17)
MAGNESIUM SERPL-MCNC: 2.3 MG/DL — SIGNIFICANT CHANGE UP (ref 1.6–2.6)
MCHC RBC-ENTMCNC: 31.3 PG — SIGNIFICANT CHANGE UP (ref 27–34)
MCHC RBC-ENTMCNC: 34 GM/DL — SIGNIFICANT CHANGE UP (ref 32–36)
MCV RBC AUTO: 92.1 FL — SIGNIFICANT CHANGE UP (ref 80–100)
PHOSPHATE SERPL-MCNC: 2.6 MG/DL — SIGNIFICANT CHANGE UP (ref 2.5–4.5)
PLATELET # BLD AUTO: 164 K/UL — SIGNIFICANT CHANGE UP (ref 150–400)
POTASSIUM SERPL-MCNC: 3.8 MMOL/L — SIGNIFICANT CHANGE UP (ref 3.5–5.3)
POTASSIUM SERPL-SCNC: 3.8 MMOL/L — SIGNIFICANT CHANGE UP (ref 3.5–5.3)
RBC # BLD: 3.93 M/UL — LOW (ref 4.2–5.8)
RBC # FLD: 12.9 % — SIGNIFICANT CHANGE UP (ref 10.3–14.5)
SODIUM SERPL-SCNC: 140 MMOL/L — SIGNIFICANT CHANGE UP (ref 135–145)
WBC # BLD: 11.21 K/UL — HIGH (ref 3.8–10.5)
WBC # FLD AUTO: 11.21 K/UL — HIGH (ref 3.8–10.5)

## 2023-10-14 PROCEDURE — 93010 ELECTROCARDIOGRAM REPORT: CPT

## 2023-10-14 PROCEDURE — 99233 SBSQ HOSP IP/OBS HIGH 50: CPT

## 2023-10-14 RX ORDER — APIXABAN 2.5 MG/1
10 TABLET, FILM COATED ORAL EVERY 12 HOURS
Refills: 0 | Status: DISCONTINUED | OUTPATIENT
Start: 2023-10-14 | End: 2023-10-15

## 2023-10-14 RX ORDER — ACETAMINOPHEN 500 MG
1000 TABLET ORAL ONCE
Refills: 0 | Status: COMPLETED | OUTPATIENT
Start: 2023-10-14 | End: 2023-10-14

## 2023-10-14 RX ADMIN — Medication 400 MILLIGRAM(S): at 19:28

## 2023-10-14 RX ADMIN — CLOPIDOGREL BISULFATE 75 MILLIGRAM(S): 75 TABLET, FILM COATED ORAL at 10:50

## 2023-10-14 RX ADMIN — APIXABAN 10 MILLIGRAM(S): 2.5 TABLET, FILM COATED ORAL at 18:10

## 2023-10-14 RX ADMIN — HEPARIN SODIUM 1200 UNIT(S)/HR: 5000 INJECTION INTRAVENOUS; SUBCUTANEOUS at 06:01

## 2023-10-14 RX ADMIN — Medication 1000 MILLIGRAM(S): at 19:44

## 2023-10-14 RX ADMIN — SIMVASTATIN 10 MILLIGRAM(S): 20 TABLET, FILM COATED ORAL at 21:11

## 2023-10-14 RX ADMIN — HEPARIN SODIUM 1200 UNIT(S)/HR: 5000 INJECTION INTRAVENOUS; SUBCUTANEOUS at 07:17

## 2023-10-14 NOTE — PROGRESS NOTE ADULT - NUTRITIONAL ASSESSMENT
This patient has been assessed with a concern for Malnutrition and has been determined to have a diagnosis/diagnoses of Moderate protein-calorie malnutrition.    This patient is being managed with:   Diet DASH/TLC-  Sodium & Cholesterol Restricted  Entered: Oct 12 2023  4:34PM

## 2023-10-14 NOTE — PROGRESS NOTE ADULT - ASSESSMENT
84 y/o male PMH CAD with PCI, systolic CHF, CVA on DAPT, HTN, HLD, daily cigar smoker, sedentary lifestyle, presented with SOB and diaphoresis     Found to have a saddle PE with RV strain   Was on low dose levo for brief period      Plan:     Neuro - AAOx3. Continue plavix     CV, resp - BP stable off levo. TTE reviewed. Continue IV heparin per protocol    GI - tolerating diet     Renal - monitor fn, lytes     ID - no issues     Heme - extensive RLE DVT, iv heparin as above     OOB     GOC discussed by CCPA - patient and family not sure at the moment, remains full code       Keep in ICU today  82 y/o male PMH CAD with PCI, systolic CHF, CVA on DAPT, HTN, HLD, daily cigar smoker, sedentary lifestyle, presented with SOB and diaphoresis     Found to have a saddle PE with RV strain   Was on low dose levo for brief period      Plan:     Neuro - AAOx3. Continue plavix     CV, resp - BP stable off levo. TTE reviewed. On IV heparin - will transition to apixaban today. Ambulated on RA today - no tachycardia or desaturation, experienced minimal SOB at the end    GI - tolerating diet     Renal - monitor fn, lytes     ID - no issues     Heme - extensive RLE DVT, iv heparin --> apixaban as above     OOB       Keep in ICU today, likely discharge home tomorrow if remains stable, d/w cardiology  82 y/o male PMH CAD with PCI, systolic CHF, CVA on DAPT, HTN, HLD, daily cigar smoker, sedentary lifestyle, presented with SOB and diaphoresis     Found to have a saddle PE with RV strain   Was on low dose levo for brief period      Plan:     Neuro - AAOx3. Continue plavix     CV, resp - BP stable off levo. TTE reviewed. On IV heparin - will transition to apixaban today. Ambulated on RA today - no tachycardia or desaturation, experienced minimal SOB at the end    GI - tolerating diet     Renal - monitor fn, lytes     ID - no issues     Heme - extensive RLE DVT, iv heparin --> apixaban as above     OOB       Keep in ICU today, likely discharge home tomorrow if remains stable, d/w cardiology       Wife updated at bedside

## 2023-10-14 NOTE — PROGRESS NOTE ADULT - SUBJECTIVE AND OBJECTIVE BOX
Patient is a 83y old  Male who presents with a chief complaint of Other pulmonary embolism without acute cor pulmonale     (13 Oct 2023 12:46)    24 hour events:     Allergies    codeine (Vomiting; Nausea)    Intolerances      REVIEW OF SYSTEMS: SEE BELOW       ICU Vital Signs Last 24 Hrs  T(C): 36.8 (14 Oct 2023 04:00), Max: 37 (13 Oct 2023 08:00)  T(F): 98.2 (14 Oct 2023 04:00), Max: 98.6 (13 Oct 2023 08:00)  HR: 81 (14 Oct 2023 07:00) (80 - 94)  BP: 139/98 (14 Oct 2023 07:00) (110/78 - 163/84)  BP(mean): 112 (14 Oct 2023 07:00) (86 - 120)  ABP: --  ABP(mean): --  RR: 15 (14 Oct 2023 07:00) (15 - 27)  SpO2: 97% (14 Oct 2023 07:00) (92% - 97%)    O2 Parameters below as of 14 Oct 2023 07:00  Patient On (Oxygen Delivery Method): nasal cannula  O2 Flow (L/min): 2          CAPILLARY BLOOD GLUCOSE          I&O's Summary    13 Oct 2023 07:01  -  14 Oct 2023 07:00  --------------------------------------------------------  IN: 285 mL / OUT: 500 mL / NET: -215 mL            MEDICATIONS  (STANDING):  clopidogrel Tablet 75 milliGRAM(s) Oral daily  heparin  Infusion.  Unit(s)/Hr (18 mL/Hr) IV Continuous <Continuous>  influenza  Vaccine (HIGH DOSE) 0.7 milliLiter(s) IntraMuscular once  simvastatin 10 milliGRAM(s) Oral at bedtime      MEDICATIONS  (PRN):  heparin   Injectable 4000 Unit(s) IV Push every 6 hours PRN For aPTT between 40 - 57  heparin   Injectable 8000 Unit(s) IV Push every 6 hours PRN For aPTT less than 40      PHYSICAL EXAM: SEE BELOW                          12.3   11.21 )-----------( 164      ( 14 Oct 2023 05:51 )             36.2       10-14    140  |  110<H>  |  18  ----------------------------<  125<H>  3.8   |  25  |  0.82    Ca    8.1<L>      14 Oct 2023 05:51  Phos  2.6     10-14  Mg     2.3     10-14    TPro  6.4  /  Alb  2.8<L>  /  TBili  0.6  /  DBili  x   /  AST  16  /  ALT  18  /  AlkPhos  69  10-12          PT/INR - ( 12 Oct 2023 13:55 )   PT: 13.5 sec;   INR: 1.20 ratio         PTT - ( 14 Oct 2023 05:51 )  PTT:71.0 sec  Urinalysis Basic - ( 14 Oct 2023 05:51 )    Color: x / Appearance: x / SG: x / pH: x  Gluc: 125 mg/dL / Ketone: x  / Bili: x / Urobili: x   Blood: x / Protein: x / Nitrite: x   Leuk Esterase: x / RBC: x / WBC x   Sq Epi: x / Non Sq Epi: x / Bacteria: x      Clean Catch Clean Catch (Midstream)   <10,000 CFU/mL Normal Urogenital [place] -- 10-12 @ 15:30  .Blood Blood-Peripheral   No growth at 24 hours -- 10-12 @ 10:36

## 2023-10-14 NOTE — PROGRESS NOTE ADULT - NEUROLOGICAL
normal/cranial nerves II-XII intact/sensation intact negative normal/cranial nerves II-XII intact/sensation intact/responds to pain/responds to verbal commands/no spontaneous movement

## 2023-10-14 NOTE — PROGRESS NOTE ADULT - RESPIRATORY
normal/clear to auscultation bilaterally/no wheezes/no rales/no rhonchi
normal/clear to auscultation bilaterally/no wheezes/no rales/no rhonchi
clear to auscultation bilaterally/no wheezes/no respiratory distress

## 2023-10-15 ENCOUNTER — TRANSCRIPTION ENCOUNTER (OUTPATIENT)
Age: 83
End: 2023-10-15

## 2023-10-15 VITALS
RESPIRATION RATE: 19 BRPM | SYSTOLIC BLOOD PRESSURE: 122 MMHG | HEART RATE: 82 BPM | DIASTOLIC BLOOD PRESSURE: 74 MMHG | OXYGEN SATURATION: 94 %

## 2023-10-15 LAB
ANION GAP SERPL CALC-SCNC: 5 MMOL/L — SIGNIFICANT CHANGE UP (ref 5–17)
BUN SERPL-MCNC: 15 MG/DL — SIGNIFICANT CHANGE UP (ref 7–23)
CALCIUM SERPL-MCNC: 8.4 MG/DL — LOW (ref 8.5–10.1)
CHLORIDE SERPL-SCNC: 111 MMOL/L — HIGH (ref 96–108)
CO2 SERPL-SCNC: 26 MMOL/L — SIGNIFICANT CHANGE UP (ref 22–31)
CREAT SERPL-MCNC: 0.85 MG/DL — SIGNIFICANT CHANGE UP (ref 0.5–1.3)
EGFR: 85 ML/MIN/1.73M2 — SIGNIFICANT CHANGE UP
GLUCOSE SERPL-MCNC: 144 MG/DL — HIGH (ref 70–99)
HCT VFR BLD CALC: 38.4 % — LOW (ref 39–50)
HGB BLD-MCNC: 12.9 G/DL — LOW (ref 13–17)
MAGNESIUM SERPL-MCNC: 2.2 MG/DL — SIGNIFICANT CHANGE UP (ref 1.6–2.6)
MCHC RBC-ENTMCNC: 30.7 PG — SIGNIFICANT CHANGE UP (ref 27–34)
MCHC RBC-ENTMCNC: 33.6 GM/DL — SIGNIFICANT CHANGE UP (ref 32–36)
MCV RBC AUTO: 91.4 FL — SIGNIFICANT CHANGE UP (ref 80–100)
NT-PROBNP SERPL-SCNC: 2423 PG/ML — HIGH (ref 0–450)
PHOSPHATE SERPL-MCNC: 2.9 MG/DL — SIGNIFICANT CHANGE UP (ref 2.5–4.5)
PLATELET # BLD AUTO: 165 K/UL — SIGNIFICANT CHANGE UP (ref 150–400)
POTASSIUM SERPL-MCNC: 3.8 MMOL/L — SIGNIFICANT CHANGE UP (ref 3.5–5.3)
POTASSIUM SERPL-SCNC: 3.8 MMOL/L — SIGNIFICANT CHANGE UP (ref 3.5–5.3)
RBC # BLD: 4.2 M/UL — SIGNIFICANT CHANGE UP (ref 4.2–5.8)
RBC # FLD: 13.1 % — SIGNIFICANT CHANGE UP (ref 10.3–14.5)
SODIUM SERPL-SCNC: 142 MMOL/L — SIGNIFICANT CHANGE UP (ref 135–145)
TROPONIN I, HIGH SENSITIVITY RESULT: 128.08 NG/L — HIGH
WBC # BLD: 10.4 K/UL — SIGNIFICANT CHANGE UP (ref 3.8–10.5)
WBC # FLD AUTO: 10.4 K/UL — SIGNIFICANT CHANGE UP (ref 3.8–10.5)

## 2023-10-15 PROCEDURE — 99239 HOSP IP/OBS DSCHRG MGMT >30: CPT

## 2023-10-15 RX ORDER — ASPIRIN/CALCIUM CARB/MAGNESIUM 324 MG
1 TABLET ORAL
Qty: 0 | Refills: 0 | DISCHARGE

## 2023-10-15 RX ORDER — APIXABAN 2.5 MG/1
2 TABLET, FILM COATED ORAL
Qty: 80 | Refills: 0
Start: 2023-10-15

## 2023-10-15 RX ADMIN — INFLUENZA VIRUS VACCINE 0.7 MILLILITER(S): 15; 15; 15; 15 SUSPENSION INTRAMUSCULAR at 13:18

## 2023-10-15 RX ADMIN — APIXABAN 10 MILLIGRAM(S): 2.5 TABLET, FILM COATED ORAL at 05:44

## 2023-10-15 RX ADMIN — CLOPIDOGREL BISULFATE 75 MILLIGRAM(S): 75 TABLET, FILM COATED ORAL at 11:20

## 2023-10-15 NOTE — DISCHARGE NOTE NURSING/CASE MANAGEMENT/SOCIAL WORK - NSDCPEEMAIL_GEN_ALL_CORE
[facility] for Tobacco Control email [email or web address]

## 2023-10-15 NOTE — DISCHARGE NOTE NURSING/CASE MANAGEMENT/SOCIAL WORK - PATIENT PORTAL LINK FT
You can access the FollowMyHealth Patient Portal offered by [facility] by registering at the following website: [email or web address]. By joining [facility] FollowMyHealth portal, you will also be able to view your health information using other applications (apps) compatible with our system.

## 2023-10-15 NOTE — DISCHARGE NOTE NURSING/CASE MANAGEMENT/SOCIAL WORK - NSDCVIVACCINE_GEN_ALL_CORE_FT
influenza, high-dose, quadrivalent; 15-Oct-2023 13:18; Harjit Lester (RN); [employer]; [id number] (Exp. Date: 01-Jun-2024); IntraMuscular; Deltoid Left.; 0.7 milliLiter(s); VIS (VIS Published: 06-Aug-2021, VIS Presented: 15-Oct-2023);

## 2023-10-15 NOTE — DISCHARGE NOTE PROVIDER - NSDCCPCAREPLAN_GEN_ALL_CORE_FT
PRINCIPAL DISCHARGE DIAGNOSIS  Diagnosis: Pulmonary embolism  Assessment and Plan of Treatment:

## 2023-10-15 NOTE — DISCHARGE NOTE NURSING/CASE MANAGEMENT/SOCIAL WORK - HAS THE PATIENT RECEIVED THE INFLUENZA VACCINE THIS SEASON?
Impression: Puckering of macula, right eye: H35.371. OD. Condition: unstable. Vision: vision affected. Plan: Discussed diagnosis in detail with patient. Discussed risks of progression. Surgical treatment is recommended to remove scar tissue, treat the swelling for vision improvement PPVx. Surgical risks and benefits were discussed, explained and understood by patient. All questions answered. RL1. Patient elects to proceed with recommendation. OCT performed today: ERM w/mild edema OD. Educational material provided to patient. Erx'd Prednisolone and Ofloxacin to the pharmacy.
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 OS. Condition: stable. Plan: Discussed diagnosis in detail with patient. Exam shows minimal Diabetic changes. No treatment is recommended at this time. Emphasized blood sugar control and advised to keep future appointments with PCP and/or Endocrinologist for the management of Diabetes. Recommend observation for now. OCT OS appears stable.
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 OD. Condition: unstable. Plan: Discussed diagnosis in detail with patient. Exam and OCT OD shows ERM with mild edema. Recommend sx OD - see notes above.
yes...

## 2023-10-15 NOTE — DISCHARGE NOTE PROVIDER - NSDCMRMEDTOKEN_GEN_ALL_CORE_FT
apixaban 5 mg oral tablet: 2 tab(s) orally 2 times a day 2 tabs oral twice daily until the night of 10/21/23, then 1 tab oral twice daily from 10/22/23 onward  lisinopril 10 mg oral tablet: 1 tab(s) orally once a day  loperamide 2 mg oral capsule: 1 cap(s) orally 4 times a day as needed for  diarrhea  montelukast 10 mg oral tablet: 1 tab(s) orally once a day  nitroglycerin 0.4 mg sublingual tablet: 1 tab(s) sublingually as needed  Otezla 30 mg oral tablet: 1 tab(s) orally 2 times a day  Plavix 75 mg oral tablet: 1 tab(s) orally once a day  simvastatin 10 mg oral tablet: 1 tab(s) orally once a day (at bedtime)

## 2023-10-15 NOTE — DISCHARGE NOTE PROVIDER - HOSPITAL COURSE
82 y/o male PMH CAD with PCI, systolic CHF, CVA on DAPT, HTN, HLD, daily cigar smoker, sedentary lifestyle, presented with SOB and diaphoresis     Hospitalized at [facility] for a saddle PE with RV strain   Improved with iv heparin which was transitioned to apixaban     Now ambulating on room air without tachycardia, shortness of breath or hypoxia     He is advised to take Tylenol alternating with Motrin for intermittent pleuritic chest pain     His labs and status improved and he is now stable for discharge home     He is extensively counseled on smoking cessation and increasing activity gradually     Wife updated at bedside     His aspirin is stopped and he is advised to take Plavix for his prior CVA and CAD  Plan d/w cardiology Dr Chaudhary who will f/u as outpatient     Prescription for apixaban sent to his pharmacy

## 2023-10-15 NOTE — PROGRESS NOTE ADULT - SUBJECTIVE AND OBJECTIVE BOX
Jovan is an 83 year old male with past medical history of  HTN, HLD, CAD s/p stents, MI, COPD not on baseline O2, prostate cancer, cerebellar infarction in 2020 with no residual deficits BIBEMS to the ED c/o diaphoresis, shortness of breath, and hypoxia. Per EMS, pt was found to be hypoxic to 84% on RA improved to 93% on 2L NC O2. Wife at bedside reports pt was fatigued on Sunday however thought nothing of it. This morning wife noted pt appeared to be "gray" and diaphoretic, so called EMS. Wife states pt today looks similar to when he had stroke 3 years ago. Pt with baseline lower extremity swelling and cough, no recent changes. No recent illness or sick contacts. + AC, on Plavix and ASA    10/12 AM: On levophed and supplemental O2. CTA showed saddle pulmonary embolus extending into the  lobar and segmental branches of bilateral pulmonary arteries. Patient admitted to ICU.    I&O's Detail  14 Oct 2023 07:01  -  15 Oct 2023 07:00  --------------------------------------------------------  IN:  Heparin Infusion: 146 mL  Oral Fluid: 1020 mL  Total IN: 1166 mL    OUT:  Voided (mL): 800 mL  Total OUT: 800 mL  Total NET: 366 mL    Complete Blood Count in AM (10.15.23 @ 10:08)   ICU Vital Signs Last 24 Hrs  T(C): 36.8 (15 Oct 2023 08:00), Max: 37.4 (14 Oct 2023 16:00)  T(F): 98.2 (15 Oct 2023 08:00), Max: 99.4 (14 Oct 2023 16:00)  HR: 83 (15 Oct 2023 10:00) (67 - 89)  BP: 134/90 (15 Oct 2023 10:00) (113/81 - 156/85)  BP(mean): 103 (15 Oct 2023 10:00) (87 - 107)  ABP: --  ABP(mean): --  RR: 20 (15 Oct 2023 10:00) (15 - 31)  SpO2: 94% (15 Oct 2023 10:00) (89% - 100%)    O2 Parameters below as of 15 Oct 2023 10:00  Patient On (Oxygen Delivery Method): room air        WBC Count: 10.40 K/uL  RBC Count: 4.20 M/uL  Hemoglobin: 12.9 g/dL  Hematocrit: 38.4 %  Mean Cell Volume: 91.4 fl  Mean Cell Hemoglobin: 30.7 pg  Mean Cell Hemoglobin Conc: 33.6 gm/dL  Red Cell Distrib Width: 13.1 %  Platelet Count - Automated: 165 K/uL    EKG on 10/12/2023   Ventricular Rate 90 BPM  Atrial Rate 90 BPM  P-R Interval 160 ms  QRS Duration 100 ms  Q-T Interval 418 ms  QTC Calculation(Bazett) 511 ms  P Axis 57 degrees  R Axis -23 degrees  T Axis 14 degrees  Diagnosis Line Sinus rhythm withPremature atrial complexes with Aberrant conduction  Nonspecific ST and T wave abnormality  Prolonged QT  Abnormal ECG  Confirmed by Maximo Hernandez ([id number]) on 10/12/2023 7:00:40 PM    CTA of chest/abdomen/pelvis on 10/12/2023  Saddle pulmonary embolus extending into the lobar and segmental branches   of bilateral pulmonary arteries.  There is CT evidence of right heart strain.  No acute finding within abdomen/pelvis.  These findings were discussed with Dr. ELI HOPE, on 10/12/2023   1:08 PM with read back.    US Duplex Venous bilateral lower extremities on 10/12/2023  Normal compressibility of the LEFT common femoral, femoral and popliteal   veins. Doppler examination shows normal spontaneous and phasic flow.  No LEFT calf vein thrombosis is detected.  IMPRESSION:  Extensive RIGHT lower extremity deep venous thrombosis extending from the   right femoral vein down to the right calf veins.  No evidence for LEFT lower extremity deep venous thrombosis.    TTE procedure: ECHO TTE W C COMP W DOPP   10/12/2023 02:10  Contrast Medium: Definity.  Mitral Valve  The mitral valve leaflets appear thickened.  Mild mitral annular calcification is present.  EA reversal of the mitral inflow consistent with reduced compliance of   the left ventricle.  AorticValve  Aortic valve appears mildly calcified.  Tricuspid Valve  Mild (1+) tricuspid valve regurgitation is present.  Pulmonic Valve  Normal appearing pulmonic valve structure and function.  Left Atrium   Normal appearing left atrium.  Left Ventricle   Mildly reduce left ventricular systolic function.   There are wall motion abnormalities noted, inferolateral hypokinesis.   Estimated left ventricular ejection fraction is 40-45 %.   Mild asymmetrical left ventricular hypertrophy is present.   Definity was administered to enhance LV endocardium.  Right Atrium  The right atrium appears dilated.  Right Ventricle  The right ventricle is mildly dilated.  TAPSE is 1.9 RN assessment completed.  Pericardial Effusion  No evidence of pericardial effusion.  Pleural Effusion  No evidence of pleural effusion.  Miscellaneous  IVC is dilated but collapsing with inspiration.  Impression  Mildly reduce left ventricular systolic function.   There are wall motion abnormalitiesnoted, inferolateral hypokinesis.   Estimated left ventricular ejection fraction is 40-45 %.   Mild asymmetrical left ventricular hypertrophy is present.   Definity was administered to enhance LV endocardium.   The right ventricle is mildly dilated.   TAPSE is 1.9 cm.   Aortic valve appears mildly calcified.   The mitral valve leaflets appear thickened.   Mild mitral annular calcification is present.   EA reversal of the mitral inflow consistent with reduced compliance of   the   left ventricle.   Mild (1+) tricuspid valve regurgitation is present.  Valves  Mitral Valve  Peak E-Wave: 49.9 cm/s  Peak A-Wave: 108 cm/s              Deceleration Time: 324 msec  Peak Gradient: 1 mmHg  E/A Ratio: 0.46  Tissue Doppler  E' Velocity: 5.18 cm/s  Aortic Valve  Peak Velocity: 101 cm/s  Peak Gradient: 4.08 mmHg  Cusp Separation: 2.2 cm  Tricuspid Valve  TR Velocity: 145 cm/s               Estimated RAP: 3 mmHg  TR Gradient: 8.41 mmHg  Estimated RVSP: 29 mmHg  Pulmonic Valve  Estimated PASP: 11.41 mmHg  Structures  Left Atrium  LA Dimension: 3 cm        LA Area: 14.8 cm^2  LA/Aorta: 0.86            LA Volume/Index: 39.4 ml /20m^2  Left Ventricle  Diastolic Dimension: 4.67 cm           Systolic Dimension: 4.1 cm  Septum Diastolic: 1.34 cm  PW Diastolic: 1.24 cm                  Area Systolic: 16.7 cm^2  Area Diastolic: 25 cm^2  FS: 12.21 %  LV Length: 7.77 cm  Right Atrium  RA Systolic Pressure: 3 mmHg                RA Area: 29.5 cm^2  Right Ventricle  RV Systolic Pressure: 11.41 mmHg  Miscellaneous  Aorta  Aortic Root: 3.5 cm  Ascending Aorta: 3.1 cm  LORNA HUMPHREYS MD; Attending Cardiologist  This document has been electronically signed. Oct 12 2023  3:48PM      Review of Systems:   · General  negative  · Skin/Breast  negative  · Ophthalmologic  negative  · ENMT  negative  · Respiratory and Thorax  negative  · Cardiovascular  Complains of chest pain (5/10) on inspiration, improving   · Gastrointestinal  negative  · Genitourinary  + Mcqueen catheter   · Musculoskeletal  negative  · Neurological  negative  · Psychiatric  negative  · Endocrine  negative    Physical Exam:   · Constitutional  no distress  · Respiratory  clear to auscultation bilaterally; no wheezes; no rales; no rhonchi  · Cardiovascular  regular rate and rhythm; S1 S2 present; 2+peripheral edema  · Gastrointestinal  soft; nontender; normal active bowel sounds  · Neurological  cranial nerves II-XII intact; sensation intact; responds to pain; responds to verbal commands; no spontaneous movement  · Psychiatric  normal affect; alert and oriented x3; normal behavior    Assessment and Plan:   · Assessment    82 y/o male PMH CAD with PCI, systolic CHF, CVA on DAPT, HTN, HLD, daily cigar smoker, sedentary lifestyle, presented with SOB and diaphoresis   Found to have a saddle PE with RV strain. Was on low dose levo for brief period, now titrated off.     Plan:   Neuro - AAOx3. Continue Plavix   CV, resp -Apixaban BID. Encourage ambulation on RA.   GI - tolerating diet   - D/C Mcqueen  Renal - monitor fn, lytes  ID - no issues   Heme - extensive RLE DVT, iv heparin --> apixaban as above   OOB     Discharge to home today or tomorrow.   Follow up with PCP  Follow up with Cards  Heme Consult outpatient                      Hematocrit: 38.4 %  Mean Cell Volume: 91.4 fl  Mean Cell Hemoglobin: 30.7 pg  Mean Cell Hemoglobin Conc: 33.6 gm/dL  Red Cell Distrib Width: 13.1 %      Allergies and Intolerances:        Allergies:  	codeine: Drug, Vomiting, Nausea     Jovan is an 83 year old male with past medical history of  HTN, HLD, CAD s/p stents, MI, COPD not on baseline O2, prostate cancer, cerebellar infarction in 2020 with no residual deficits BIBEMS to the ED c/o diaphoresis, shortness of breath, and hypoxia. Per EMS, pt was found to be hypoxic to 84% on RA improved to 93% on 2L NC O2. Wife at bedside reports pt was fatigued on Sunday however thought nothing of it. This morning wife noted pt appeared to be "gray" and diaphoretic, so called EMS. Wife states pt today looks similar to when he had stroke 3 years ago. Pt with baseline lower extremity swelling and cough, no recent changes. No recent illness or sick contacts. + AC, on Plavix and ASA    10/12 AM: On levophed and supplemental O2. CTA showed saddle pulmonary embolus extending into the  lobar and segmental branches of bilateral pulmonary arteries. Patient admitted to ICU.    I&O's Detail  14 Oct 2023 07:01  -  15 Oct 2023 07:00  --------------------------------------------------------  IN:  Heparin Infusion: 146 mL  Oral Fluid: 1020 mL  Total IN: 1166 mL    OUT:  Voided (mL): 800 mL  Total OUT: 800 mL  Total NET: 366 mL    Complete Blood Count in AM (10.15.23 @ 10:08)   ICU Vital Signs Last 24 Hrs  T(C): 36.8 (15 Oct 2023 08:00), Max: 37.4 (14 Oct 2023 16:00)  T(F): 98.2 (15 Oct 2023 08:00), Max: 99.4 (14 Oct 2023 16:00)  HR: 83 (15 Oct 2023 10:00) (67 - 89)  BP: 134/90 (15 Oct 2023 10:00) (113/81 - 156/85)  BP(mean): 103 (15 Oct 2023 10:00) (87 - 107)  ABP: --  ABP(mean): --  RR: 20 (15 Oct 2023 10:00) (15 - 31)  SpO2: 94% (15 Oct 2023 10:00) (89% - 100%)    O2 Parameters below as of 15 Oct 2023 10:00  Patient On (Oxygen Delivery Method): room air        WBC Count: 10.40 K/uL  RBC Count: 4.20 M/uL  Hemoglobin: 12.9 g/dL  Hematocrit: 38.4 %  Mean Cell Volume: 91.4 fl  Mean Cell Hemoglobin: 30.7 pg  Mean Cell Hemoglobin Conc: 33.6 gm/dL  Red Cell Distrib Width: 13.1 %  Platelet Count - Automated: 165 K/uL    EKG on 10/12/2023   Ventricular Rate 90 BPM  Atrial Rate 90 BPM  P-R Interval 160 ms  QRS Duration 100 ms  Q-T Interval 418 ms  QTC Calculation(Bazett) 511 ms  P Axis 57 degrees  R Axis -23 degrees  T Axis 14 degrees  Diagnosis Line Sinus rhythm withPremature atrial complexes with Aberrant conduction  Nonspecific ST and T wave abnormality  Prolonged QT  Abnormal ECG  Confirmed by Maximo Hernandez ([id number]) on 10/12/2023 7:00:40 PM    CTA of chest/abdomen/pelvis on 10/12/2023  Saddle pulmonary embolus extending into the lobar and segmental branches   of bilateral pulmonary arteries.  There is CT evidence of right heart strain.  No acute finding within abdomen/pelvis.  These findings were discussed with Dr. ELI HOPE, on 10/12/2023   1:08 PM with read back.    US Duplex Venous bilateral lower extremities on 10/12/2023  Normal compressibility of the LEFT common femoral, femoral and popliteal   veins. Doppler examination shows normal spontaneous and phasic flow.  No LEFT calf vein thrombosis is detected.  IMPRESSION:  Extensive RIGHT lower extremity deep venous thrombosis extending from the   right femoral vein down to the right calf veins.  No evidence for LEFT lower extremity deep venous thrombosis.    TTE procedure: ECHO TTE W C COMP W DOPP   10/12/2023 02:10  Contrast Medium: Definity.  Mitral Valve  The mitral valve leaflets appear thickened.  Mild mitral annular calcification is present.  EA reversal of the mitral inflow consistent with reduced compliance of   the left ventricle.  AorticValve  Aortic valve appears mildly calcified.  Tricuspid Valve  Mild (1+) tricuspid valve regurgitation is present.  Pulmonic Valve  Normal appearing pulmonic valve structure and function.  Left Atrium   Normal appearing left atrium.  Left Ventricle   Mildly reduce left ventricular systolic function.   There are wall motion abnormalities noted, inferolateral hypokinesis.   Estimated left ventricular ejection fraction is 40-45 %.   Mild asymmetrical left ventricular hypertrophy is present.   Definity was administered to enhance LV endocardium.  Right Atrium  The right atrium appears dilated.  Right Ventricle  The right ventricle is mildly dilated.  TAPSE is 1.9 RN assessment completed.  Pericardial Effusion  No evidence of pericardial effusion.  Pleural Effusion  No evidence of pleural effusion.  Miscellaneous  IVC is dilated but collapsing with inspiration.  Impression  Mildly reduce left ventricular systolic function.   There are wall motion abnormalitiesnoted, inferolateral hypokinesis.   Estimated left ventricular ejection fraction is 40-45 %.   Mild asymmetrical left ventricular hypertrophy is present.   Definity was administered to enhance LV endocardium.   The right ventricle is mildly dilated.   TAPSE is 1.9 cm.   Aortic valve appears mildly calcified.   The mitral valve leaflets appear thickened.   Mild mitral annular calcification is present.   EA reversal of the mitral inflow consistent with reduced compliance of   the   left ventricle.   Mild (1+) tricuspid valve regurgitation is present.  Valves  Mitral Valve  Peak E-Wave: 49.9 cm/s  Peak A-Wave: 108 cm/s              Deceleration Time: 324 msec  Peak Gradient: 1 mmHg  E/A Ratio: 0.46  Tissue Doppler  E' Velocity: 5.18 cm/s  Aortic Valve  Peak Velocity: 101 cm/s  Peak Gradient: 4.08 mmHg  Cusp Separation: 2.2 cm  Tricuspid Valve  TR Velocity: 145 cm/s               Estimated RAP: 3 mmHg  TR Gradient: 8.41 mmHg  Estimated RVSP: 29 mmHg  Pulmonic Valve  Estimated PASP: 11.41 mmHg  Structures  Left Atrium  LA Dimension: 3 cm        LA Area: 14.8 cm^2  LA/Aorta: 0.86            LA Volume/Index: 39.4 ml /20m^2  Left Ventricle  Diastolic Dimension: 4.67 cm           Systolic Dimension: 4.1 cm  Septum Diastolic: 1.34 cm  PW Diastolic: 1.24 cm                  Area Systolic: 16.7 cm^2  Area Diastolic: 25 cm^2  FS: 12.21 %  LV Length: 7.77 cm  Right Atrium  RA Systolic Pressure: 3 mmHg                RA Area: 29.5 cm^2  Right Ventricle  RV Systolic Pressure: 11.41 mmHg  Miscellaneous  Aorta  Aortic Root: 3.5 cm  Ascending Aorta: 3.1 cm  LORNA HUMPHREYS MD; Attending Cardiologist  This document has been electronically signed. Oct 12 2023  3:48PM      Review of Systems:   · General  negative  · Skin/Breast  negative  · Ophthalmologic  negative  · ENMT  negative  · Respiratory and Thorax  negative  · Cardiovascular  Complains of chest pain (5/10) on inspiration, improving   · Gastrointestinal  negative  · Genitourinary  negative  · Musculoskeletal  negative  · Neurological  negative  · Psychiatric  negative  · Endocrine  negative    Physical Exam:   · Constitutional  no distress  · Respiratory  clear to auscultation bilaterally; no wheezes; no rales; no rhonchi  · Cardiovascular  regular rate and rhythm; S1 S2 present; 2+peripheral edema  · Gastrointestinal  soft; nontender; normal active bowel sounds  · Neurological  cranial nerves II-XII intact; sensation intact; responds to pain; responds to verbal commands; no spontaneous movement  · Psychiatric  normal affect; alert and oriented x3; normal behavior    Assessment and Plan:   · Assessment  This is an 83 year old male PMH CAD with PCI, systolic CHF, CVA on DAPT, HTN, HLD, daily cigar smoker, sedentary lifestyle, presented with SOB and diaphoresis   Found to have a saddle PE with RV strain. Was on low dose levo for brief period, now titrated off.     Plan:   Neuro - AAOx3. Continue Plavix   CV, resp -Apixaban BID. Encourage ambulation on RA.   GI - tolerating diet   Renal - monitor fn, lytes  ID - no issues   Heme - extensive RLE DVT- Continue Apixaban as above   OOB and ambulate with PT     Discharge to home today.   Follow up with PCP  Follow up with Cards  Heme Consult outpatient                      Hematocrit: 38.4 %  Mean Cell Volume: 91.4 fl  Mean Cell Hemoglobin: 30.7 pg  Mean Cell Hemoglobin Conc: 33.6 gm/dL  Red Cell Distrib Width: 13.1 %      Allergies and Intolerances:        Allergies:  	codeine: Drug, Vomiting, Nausea     Jovan is an 83 year old male with past medical history of  HTN, HLD, CAD s/p stents, MI, COPD not on baseline O2, prostate cancer, cerebellar infarction in 2020 with no residual deficits BIBEMS to the ED c/o diaphoresis, shortness of breath, and hypoxia. Per EMS, pt was found to be hypoxic to 84% on RA improved to 93% on 2L NC O2. Wife at bedside reports pt was fatigued on Sunday however thought nothing of it. This morning wife noted pt appeared to be "gray" and diaphoretic, so called EMS. Wife states pt today looks similar to when he had stroke 3 years ago. Pt with baseline lower extremity swelling and cough, no recent changes. No recent illness or sick contacts. + AC, on Plavix and ASA    10/12 AM: On levophed and supplemental O2. CTA showed saddle pulmonary embolus extending into the  lobar and segmental branches of bilateral pulmonary arteries. Patient admitted to ICU.    I&O's Detail  14 Oct 2023 07:01  -  15 Oct 2023 07:00  --------------------------------------------------------  IN:  Heparin Infusion: 146 mL  Oral Fluid: 1020 mL  Total IN: 1166 mL    OUT:  Voided (mL): 800 mL  Total OUT: 800 mL  Total NET: 366 mL    Complete Blood Count in AM (10.15.23 @ 10:08)   ICU Vital Signs Last 24 Hrs  T(C): 36.8 (15 Oct 2023 08:00), Max: 37.4 (14 Oct 2023 16:00)  T(F): 98.2 (15 Oct 2023 08:00), Max: 99.4 (14 Oct 2023 16:00)  HR: 83 (15 Oct 2023 10:00) (67 - 89)  BP: 134/90 (15 Oct 2023 10:00) (113/81 - 156/85)  BP(mean): 103 (15 Oct 2023 10:00) (87 - 107)  ABP: --  ABP(mean): --  RR: 20 (15 Oct 2023 10:00) (15 - 31)  SpO2: 94% (15 Oct 2023 10:00) (89% - 100%)    O2 Parameters below as of 15 Oct 2023 10:00  Patient On (Oxygen Delivery Method): room air      WBC Count: 10.40 K/uL  RBC Count: 4.20 M/uL  Hemoglobin: 12.9 g/dL  Hematocrit: 38.4 %  Mean Cell Volume: 91.4 fl  Mean Cell Hemoglobin: 30.7 pg  Mean Cell Hemoglobin Conc: 33.6 gm/dL  Red Cell Distrib Width: 13.1 %  Platelet Count - Automated: 165 K/uL    EKG on 10/12/2023   Ventricular Rate 90 BPM  Atrial Rate 90 BPM  P-R Interval 160 ms  QRS Duration 100 ms  Q-T Interval 418 ms  QTC Calculation(Bazett) 511 ms  P Axis 57 degrees  R Axis -23 degrees  T Axis 14 degrees  Diagnosis Line Sinus rhythm withPremature atrial complexes with Aberrant conduction  Nonspecific ST and T wave abnormality  Prolonged QT  Abnormal ECG  Confirmed by Maximo Hernandez ([id number]) on 10/12/2023 7:00:40 PM    CTA of chest/abdomen/pelvis on 10/12/2023  Saddle pulmonary embolus extending into the lobar and segmental branches   of bilateral pulmonary arteries.  There is CT evidence of right heart strain.  No acute finding within abdomen/pelvis.  These findings were discussed with Dr. ELI HOPE, on 10/12/2023   1:08 PM with read back.    US Duplex Venous bilateral lower extremities on 10/12/2023  Normal compressibility of the LEFT common femoral, femoral and popliteal   veins. Doppler examination shows normal spontaneous and phasic flow.  No LEFT calf vein thrombosis is detected.  IMPRESSION:  Extensive RIGHT lower extremity deep venous thrombosis extending from the   right femoral vein down to the right calf veins.  No evidence for LEFT lower extremity deep venous thrombosis.    TTE procedure: ECHO TTE W C COMP W DOPP   10/12/2023 02:10  Contrast Medium: Definity.  Mitral Valve  The mitral valve leaflets appear thickened.  Mild mitral annular calcification is present.  EA reversal of the mitral inflow consistent with reduced compliance of   the left ventricle.  AorticValve  Aortic valve appears mildly calcified.  Tricuspid Valve  Mild (1+) tricuspid valve regurgitation is present.  Pulmonic Valve  Normal appearing pulmonic valve structure and function.  Left Atrium   Normal appearing left atrium.  Left Ventricle   Mildly reduce left ventricular systolic function.   There are wall motion abnormalities noted, inferolateral hypokinesis.   Estimated left ventricular ejection fraction is 40-45 %.   Mild asymmetrical left ventricular hypertrophy is present.   Definity was administered to enhance LV endocardium.  Right Atrium  The right atrium appears dilated.  Right Ventricle  The right ventricle is mildly dilated.  TAPSE is 1.9 RN assessment completed.  Pericardial Effusion  No evidence of pericardial effusion.  Pleural Effusion  No evidence of pleural effusion.  Miscellaneous  IVC is dilated but collapsing with inspiration.  Impression  Mildly reduce left ventricular systolic function.   There are wall motion abnormalitiesnoted, inferolateral hypokinesis.   Estimated left ventricular ejection fraction is 40-45 %.   Mild asymmetrical left ventricular hypertrophy is present.   Definity was administered to enhance LV endocardium.   The right ventricle is mildly dilated.   TAPSE is 1.9 cm.   Aortic valve appears mildly calcified.   The mitral valve leaflets appear thickened.   Mild mitral annular calcification is present.   EA reversal of the mitral inflow consistent with reduced compliance of   the   left ventricle.   Mild (1+) tricuspid valve regurgitation is present.  Valves  Mitral Valve  Peak E-Wave: 49.9 cm/s  Peak A-Wave: 108 cm/s              Deceleration Time: 324 msec  Peak Gradient: 1 mmHg  E/A Ratio: 0.46  Tissue Doppler  E' Velocity: 5.18 cm/s  Aortic Valve  Peak Velocity: 101 cm/s  Peak Gradient: 4.08 mmHg  Cusp Separation: 2.2 cm  Tricuspid Valve  TR Velocity: 145 cm/s               Estimated RAP: 3 mmHg  TR Gradient: 8.41 mmHg  Estimated RVSP: 29 mmHg  Pulmonic Valve  Estimated PASP: 11.41 mmHg  Structures  Left Atrium  LA Dimension: 3 cm        LA Area: 14.8 cm^2  LA/Aorta: 0.86            LA Volume/Index: 39.4 ml /20m^2  Left Ventricle  Diastolic Dimension: 4.67 cm           Systolic Dimension: 4.1 cm  Septum Diastolic: 1.34 cm  PW Diastolic: 1.24 cm                  Area Systolic: 16.7 cm^2  Area Diastolic: 25 cm^2  FS: 12.21 %  LV Length: 7.77 cm  Right Atrium  RA Systolic Pressure: 3 mmHg                RA Area: 29.5 cm^2  Right Ventricle  RV Systolic Pressure: 11.41 mmHg  Miscellaneous  Aorta  Aortic Root: 3.5 cm  Ascending Aorta: 3.1 cm  LORNA HUMPHREYS MD; Attending Cardiologist  This document has been electronically signed. Oct 12 2023  3:48PM      Review of Systems:   · General  negative  · Skin/Breast  negative  · Ophthalmologic  negative  · ENMT  negative  · Respiratory and Thorax  negative  · Cardiovascular  Complains of chest pain (5/10) on inspiration, improving   · Gastrointestinal  negative  · Genitourinary  negative  · Musculoskeletal  negative  · Neurological  negative  · Psychiatric  negative  · Endocrine  negative    Physical Exam:   · Constitutional  no distress  · Respiratory  clear to auscultation bilaterally; no wheezes; no rales; no rhonchi  · Cardiovascular  regular rate and rhythm; S1 S2 present; 2+peripheral edema  · Gastrointestinal  soft; nontender; normal active bowel sounds  · Neurological  cranial nerves II-XII intact; sensation intact; responds to pain; responds to verbal commands; no spontaneous movement  · Psychiatric  normal affect; alert and oriented x3; normal behavior    Assessment and Plan:   · Assessment  This is an 83 year old male PMH CAD with PCI, systolic CHF, CVA on DAPT, HTN, HLD, daily cigar smoker, sedentary lifestyle, presented with SOB and diaphoresis   Found to have a saddle PE with RV strain. Was on low dose levo for brief period, now titrated off.     Plan:   Neuro - AAOx3. Continue Plavix   CV, resp -Apixaban BID.    GI - tolerating diet   Renal - monitor electrolytes  ID - no issues   Heme - extensive RLE DVT- Continue Apixaban as above   OOB and ambulate as needed     Discharge to home today.   Follow up with PCP  Follow up with Cards  Heme Consult outpatient                      Hematocrit: 38.4 %  Mean Cell Volume: 91.4 fl  Mean Cell Hemoglobin: 30.7 pg  Mean Cell Hemoglobin Conc: 33.6 gm/dL  Red Cell Distrib Width: 13.1 %      Allergies and Intolerances:        Allergies:  	codeine: Drug, Vomiting, Nausea

## 2023-10-15 NOTE — DISCHARGE NOTE PROVIDER - DETAILS OF MALNUTRITION DIAGNOSIS/DIAGNOSES
This patient has been assessed with a concern for Malnutrition and was treated during this hospitalization for the following Nutrition diagnosis/diagnoses:     -  10/13/2023: Moderate protein-calorie malnutrition

## 2023-10-15 NOTE — DISCHARGE NOTE PROVIDER - PROVIDER TOKENS
PROVIDER:[TOKEN:[[id number]],FOLLOWUP:[2 weeks],ESTABLISHEDPATIENT:[T]],PROVIDER:[TOKEN:[[id number]],FOLLOWUP:[1 week],ESTABLISHEDPATIENT:[T]]

## 2023-10-15 NOTE — DISCHARGE NOTE PROVIDER - CARE PROVIDER_API CALL
Sameer Rodriguez  Family Medicine  [address], [address]  [place] [address]  Phone: [phone]  Fax: [phone]  Established Patient  Follow Up Time: 2 weeks    Leatha Chaudhary  Cardiology  [address] [place] [address]  Phone: [phone]  Fax: [phone]  Established Patient  Follow Up Time: 1 week

## 2023-10-15 NOTE — DISCHARGE NOTE PROVIDER - ATTENDING DISCHARGE PHYSICAL EXAMINATION:
T(C): 36.8 (10-15-23 @ 08:00), Max: 37.4 (10-14-23 @ 16:00)  HR: 84 (10-15-23 @ 11:40) (67 - 89)  BP: 137/66 (10-15-23 @ 11:40) (110/74 - 156/85)  RR: 25 (10-15-23 @ 11:40) (15 - 31)  SpO2: 93% (10-15-23 @ 11:40) (89% - 100%)  10-14-23 @ 07:01  -  10-15-23 @ 07:00  --------------------------------------------------------  IN: 1166 mL / OUT: 800 mL / NET: 366 mL    10-15-23 @ 07:01  -  10-15-23 @ 12:26  --------------------------------------------------------  IN: 240 mL / OUT: 300 mL / NET: -60 mL    PHYSICAL EXAM  General: NAD  CNS: AAOx3, no focal deficit  HEENT: PERRL  Resp: CTA b/l, good AE   CVS: S1S2 regular   Abd: soft, NT, ND, +BS  Ext: trace edema   Skin: warm                          12.9   10.40 )-----------( 165      ( 15 Oct 2023 10:08 )             38.4       10-15    142  |  111<H>  |  15  ----------------------------<  144<H>  3.8   |  26  |  0.85    Ca    8.4<L>      15 Oct 2023 10:08  Phos  2.9     10-15  Mg     2.2     10-15            PTT - ( 14 Oct 2023 05:51 )  PTT:71.0 sec  Urinalysis Basic - ( 15 Oct 2023 10:08 )    Color: x / Appearance: x / SG: x / pH: x  Gluc: 144 mg/dL / Ketone: x  / Bili: x / Urobili: x   Blood: x / Protein: x / Nitrite: x   Leuk Esterase: x / RBC: x / WBC x   Sq Epi: x / Non Sq Epi: x / Bacteria: x      Clean Catch Clean Catch (Midstream)   <10,000 CFU/mL Normal Urogenital Linda -- 10-12 @ 15:30  .Blood Blood-Peripheral   No growth at 48 Hours -- 10-12 @ 10:36

## 2023-10-15 NOTE — DISCHARGE NOTE NURSING/CASE MANAGEMENT/SOCIAL WORK - NSDCPEWEB_GEN_ALL_CORE
[facility] for Tobacco Control website --- [email or web address] website --- [email or web address]

## 2023-10-17 LAB
CULTURE RESULTS: SIGNIFICANT CHANGE UP
SPECIMEN SOURCE: SIGNIFICANT CHANGE UP

## 2023-10-24 DIAGNOSIS — N18.9 CHRONIC KIDNEY DISEASE, UNSPECIFIED: ICD-10-CM

## 2023-10-24 DIAGNOSIS — I12.9 HYPERTENSIVE CHRONIC KIDNEY DISEASE WITH STAGE 1 THROUGH STAGE 4 CHRONIC KIDNEY DISEASE, OR UNSPECIFIED CHRONIC KIDNEY DISEASE: ICD-10-CM

## 2023-10-24 DIAGNOSIS — R57.0 CARDIOGENIC SHOCK: ICD-10-CM

## 2023-10-24 DIAGNOSIS — I25.10 ATHEROSCLEROTIC HEART DISEASE OF NATIVE CORONARY ARTERY WITHOUT ANGINA PECTORIS: ICD-10-CM

## 2023-10-24 DIAGNOSIS — I50.22 CHRONIC SYSTOLIC (CONGESTIVE) HEART FAILURE: ICD-10-CM

## 2023-10-24 DIAGNOSIS — I82.401 ACUTE EMBOLISM AND THROMBOSIS OF UNSPECIFIED DEEP VEINS OF RIGHT LOWER EXTREMITY: ICD-10-CM

## 2023-10-24 DIAGNOSIS — I08.3 COMBINED RHEUMATIC DISORDERS OF MITRAL, AORTIC AND TRICUSPID VALVES: ICD-10-CM

## 2023-10-24 DIAGNOSIS — E87.6 HYPOKALEMIA: ICD-10-CM

## 2023-10-24 DIAGNOSIS — E44.0 MODERATE PROTEIN-CALORIE MALNUTRITION: ICD-10-CM

## 2023-10-24 DIAGNOSIS — J44.9 CHRONIC OBSTRUCTIVE PULMONARY DISEASE, UNSPECIFIED: ICD-10-CM

## 2023-10-24 DIAGNOSIS — Z95.5 PRESENCE OF CORONARY ANGIOPLASTY IMPLANT AND GRAFT: ICD-10-CM

## 2023-10-24 DIAGNOSIS — I25.2 OLD MYOCARDIAL INFARCTION: ICD-10-CM

## 2023-10-24 DIAGNOSIS — E87.20 ACIDOSIS, UNSPECIFIED: ICD-10-CM

## 2023-10-24 DIAGNOSIS — I26.92 SADDLE EMBOLUS OF PULMONARY ARTERY WITHOUT ACUTE COR PULMONALE: ICD-10-CM

## 2023-10-24 DIAGNOSIS — C61 MALIGNANT NEOPLASM OF PROSTATE: ICD-10-CM

## 2023-10-24 DIAGNOSIS — R60.0 LOCALIZED EDEMA: ICD-10-CM

## 2023-10-24 DIAGNOSIS — E78.5 HYPERLIPIDEMIA, UNSPECIFIED: ICD-10-CM

## 2023-10-24 DIAGNOSIS — J96.01 ACUTE RESPIRATORY FAILURE WITH HYPOXIA: ICD-10-CM

## 2023-11-01 ENCOUNTER — APPOINTMENT (OUTPATIENT)
Dept: FAMILY MEDICINE | Facility: CLINIC | Age: 83
End: 2023-11-01
Payer: MEDICARE

## 2023-11-01 VITALS
TEMPERATURE: 97 F | HEIGHT: 70.5 IN | OXYGEN SATURATION: 98 % | WEIGHT: 219 LBS | HEART RATE: 90 BPM | SYSTOLIC BLOOD PRESSURE: 102 MMHG | BODY MASS INDEX: 31 KG/M2 | DIASTOLIC BLOOD PRESSURE: 60 MMHG

## 2023-11-01 DIAGNOSIS — I26.92 SADDLE EMBOLUS OF PULMONARY ARTERY W/OUT ACUTE COR PULMONALE: ICD-10-CM

## 2023-11-01 PROCEDURE — 99215 OFFICE O/P EST HI 40 MIN: CPT

## 2023-11-01 RX ORDER — APIXABAN 5 MG/1
5 TABLET, FILM COATED ORAL
Qty: 60 | Refills: 3 | Status: ACTIVE | COMMUNITY

## 2023-11-01 RX ORDER — ASPIRIN ENTERIC COATED TABLETS 81 MG 81 MG/1
81 TABLET, DELAYED RELEASE ORAL
Refills: 0 | Status: COMPLETED | COMMUNITY
End: 2023-11-01

## 2023-11-05 ENCOUNTER — EMERGENCY (EMERGENCY)
Facility: HOSPITAL | Age: 83
LOS: 0 days | Discharge: HOME | End: 2023-11-05
Attending: STUDENT IN AN ORGANIZED HEALTH CARE EDUCATION/TRAINING PROGRAM
Payer: MEDICARE

## 2023-11-05 VITALS
DIASTOLIC BLOOD PRESSURE: 78 MMHG | RESPIRATION RATE: 16 BRPM | TEMPERATURE: 98 F | HEART RATE: 64 BPM | OXYGEN SATURATION: 100 % | SYSTOLIC BLOOD PRESSURE: 159 MMHG

## 2023-11-05 VITALS — HEIGHT: 70 IN | WEIGHT: 210.1 LBS

## 2023-11-05 DIAGNOSIS — I25.2 OLD MYOCARDIAL INFARCTION: ICD-10-CM

## 2023-11-05 DIAGNOSIS — M54.50 LOW BACK PAIN, UNSPECIFIED: ICD-10-CM

## 2023-11-05 DIAGNOSIS — Z90.89 ACQUIRED ABSENCE OF OTHER ORGANS: ICD-10-CM

## 2023-11-05 DIAGNOSIS — Z95.5 PRESENCE OF CORONARY ANGIOPLASTY IMPLANT AND GRAFT: ICD-10-CM

## 2023-11-05 DIAGNOSIS — I10 ESSENTIAL (PRIMARY) HYPERTENSION: ICD-10-CM

## 2023-11-05 DIAGNOSIS — Z85.46 PERSONAL HISTORY OF MALIGNANT NEOPLASM OF PROSTATE: ICD-10-CM

## 2023-11-05 DIAGNOSIS — I25.10 ATHEROSCLEROTIC HEART DISEASE OF NATIVE CORONARY ARTERY WITHOUT ANGINA PECTORIS: ICD-10-CM

## 2023-11-05 DIAGNOSIS — Z88.5 ALLERGY STATUS TO NARCOTIC AGENT: ICD-10-CM

## 2023-11-05 DIAGNOSIS — M25.551 PAIN IN RIGHT HIP: ICD-10-CM

## 2023-11-05 DIAGNOSIS — Z86.73 PERSONAL HISTORY OF TRANSIENT ISCHEMIC ATTACK (TIA), AND CEREBRAL INFARCTION WITHOUT RESIDUAL DEFICITS: ICD-10-CM

## 2023-11-05 DIAGNOSIS — J44.9 CHRONIC OBSTRUCTIVE PULMONARY DISEASE, UNSPECIFIED: ICD-10-CM

## 2023-11-05 DIAGNOSIS — Z79.02 LONG TERM (CURRENT) USE OF ANTITHROMBOTICS/ANTIPLATELETS: ICD-10-CM

## 2023-11-05 DIAGNOSIS — E78.00 PURE HYPERCHOLESTEROLEMIA, UNSPECIFIED: ICD-10-CM

## 2023-11-05 LAB
ALBUMIN SERPL ELPH-MCNC: 3 G/DL — LOW (ref 3.3–5)
ALBUMIN SERPL ELPH-MCNC: 3 G/DL — LOW (ref 3.3–5)
ALP SERPL-CCNC: 63 U/L — SIGNIFICANT CHANGE UP (ref 40–120)
ALP SERPL-CCNC: 63 U/L — SIGNIFICANT CHANGE UP (ref 40–120)
ALT FLD-CCNC: 14 U/L — SIGNIFICANT CHANGE UP (ref 12–78)
ALT FLD-CCNC: 14 U/L — SIGNIFICANT CHANGE UP (ref 12–78)
ANION GAP SERPL CALC-SCNC: 6 MMOL/L — SIGNIFICANT CHANGE UP (ref 5–17)
ANION GAP SERPL CALC-SCNC: 6 MMOL/L — SIGNIFICANT CHANGE UP (ref 5–17)
APPEARANCE UR: CLEAR — SIGNIFICANT CHANGE UP
APPEARANCE UR: CLEAR — SIGNIFICANT CHANGE UP
AST SERPL-CCNC: 12 U/L — LOW (ref 15–37)
AST SERPL-CCNC: 12 U/L — LOW (ref 15–37)
BASOPHILS # BLD AUTO: 0.07 K/UL — SIGNIFICANT CHANGE UP (ref 0–0.2)
BASOPHILS # BLD AUTO: 0.07 K/UL — SIGNIFICANT CHANGE UP (ref 0–0.2)
BASOPHILS NFR BLD AUTO: 0.8 % — SIGNIFICANT CHANGE UP (ref 0–2)
BASOPHILS NFR BLD AUTO: 0.8 % — SIGNIFICANT CHANGE UP (ref 0–2)
BILIRUB SERPL-MCNC: 0.3 MG/DL — SIGNIFICANT CHANGE UP (ref 0.2–1.2)
BILIRUB SERPL-MCNC: 0.3 MG/DL — SIGNIFICANT CHANGE UP (ref 0.2–1.2)
BILIRUB UR-MCNC: NEGATIVE — SIGNIFICANT CHANGE UP
BILIRUB UR-MCNC: NEGATIVE — SIGNIFICANT CHANGE UP
BUN SERPL-MCNC: 17 MG/DL — SIGNIFICANT CHANGE UP (ref 7–23)
BUN SERPL-MCNC: 17 MG/DL — SIGNIFICANT CHANGE UP (ref 7–23)
CALCIUM SERPL-MCNC: 9 MG/DL — SIGNIFICANT CHANGE UP (ref 8.5–10.1)
CALCIUM SERPL-MCNC: 9 MG/DL — SIGNIFICANT CHANGE UP (ref 8.5–10.1)
CHLORIDE SERPL-SCNC: 111 MMOL/L — HIGH (ref 96–108)
CHLORIDE SERPL-SCNC: 111 MMOL/L — HIGH (ref 96–108)
CO2 SERPL-SCNC: 28 MMOL/L — SIGNIFICANT CHANGE UP (ref 22–31)
CO2 SERPL-SCNC: 28 MMOL/L — SIGNIFICANT CHANGE UP (ref 22–31)
COLOR SPEC: YELLOW — SIGNIFICANT CHANGE UP
COLOR SPEC: YELLOW — SIGNIFICANT CHANGE UP
CREAT SERPL-MCNC: 0.74 MG/DL — SIGNIFICANT CHANGE UP (ref 0.5–1.3)
CREAT SERPL-MCNC: 0.74 MG/DL — SIGNIFICANT CHANGE UP (ref 0.5–1.3)
DIFF PNL FLD: NEGATIVE — SIGNIFICANT CHANGE UP
DIFF PNL FLD: NEGATIVE — SIGNIFICANT CHANGE UP
EGFR: 89 ML/MIN/1.73M2 — SIGNIFICANT CHANGE UP
EGFR: 89 ML/MIN/1.73M2 — SIGNIFICANT CHANGE UP
EOSINOPHIL # BLD AUTO: 0.19 K/UL — SIGNIFICANT CHANGE UP (ref 0–0.5)
EOSINOPHIL # BLD AUTO: 0.19 K/UL — SIGNIFICANT CHANGE UP (ref 0–0.5)
EOSINOPHIL NFR BLD AUTO: 2.1 % — SIGNIFICANT CHANGE UP (ref 0–6)
EOSINOPHIL NFR BLD AUTO: 2.1 % — SIGNIFICANT CHANGE UP (ref 0–6)
GLUCOSE SERPL-MCNC: 113 MG/DL — HIGH (ref 70–99)
GLUCOSE SERPL-MCNC: 113 MG/DL — HIGH (ref 70–99)
GLUCOSE UR QL: NEGATIVE MG/DL — SIGNIFICANT CHANGE UP
GLUCOSE UR QL: NEGATIVE MG/DL — SIGNIFICANT CHANGE UP
HCT VFR BLD CALC: 38.5 % — LOW (ref 39–50)
HCT VFR BLD CALC: 38.5 % — LOW (ref 39–50)
HGB BLD-MCNC: 12.7 G/DL — LOW (ref 13–17)
HGB BLD-MCNC: 12.7 G/DL — LOW (ref 13–17)
IMM GRANULOCYTES NFR BLD AUTO: 0.3 % — SIGNIFICANT CHANGE UP (ref 0–0.9)
IMM GRANULOCYTES NFR BLD AUTO: 0.3 % — SIGNIFICANT CHANGE UP (ref 0–0.9)
KETONES UR-MCNC: NEGATIVE MG/DL — SIGNIFICANT CHANGE UP
KETONES UR-MCNC: NEGATIVE MG/DL — SIGNIFICANT CHANGE UP
LEUKOCYTE ESTERASE UR-ACNC: NEGATIVE — SIGNIFICANT CHANGE UP
LEUKOCYTE ESTERASE UR-ACNC: NEGATIVE — SIGNIFICANT CHANGE UP
LYMPHOCYTES # BLD AUTO: 1.98 K/UL — SIGNIFICANT CHANGE UP (ref 1–3.3)
LYMPHOCYTES # BLD AUTO: 1.98 K/UL — SIGNIFICANT CHANGE UP (ref 1–3.3)
LYMPHOCYTES # BLD AUTO: 21.8 % — SIGNIFICANT CHANGE UP (ref 13–44)
LYMPHOCYTES # BLD AUTO: 21.8 % — SIGNIFICANT CHANGE UP (ref 13–44)
MCHC RBC-ENTMCNC: 30.2 PG — SIGNIFICANT CHANGE UP (ref 27–34)
MCHC RBC-ENTMCNC: 30.2 PG — SIGNIFICANT CHANGE UP (ref 27–34)
MCHC RBC-ENTMCNC: 33 GM/DL — SIGNIFICANT CHANGE UP (ref 32–36)
MCHC RBC-ENTMCNC: 33 GM/DL — SIGNIFICANT CHANGE UP (ref 32–36)
MCV RBC AUTO: 91.4 FL — SIGNIFICANT CHANGE UP (ref 80–100)
MCV RBC AUTO: 91.4 FL — SIGNIFICANT CHANGE UP (ref 80–100)
MONOCYTES # BLD AUTO: 0.81 K/UL — SIGNIFICANT CHANGE UP (ref 0–0.9)
MONOCYTES # BLD AUTO: 0.81 K/UL — SIGNIFICANT CHANGE UP (ref 0–0.9)
MONOCYTES NFR BLD AUTO: 8.9 % — SIGNIFICANT CHANGE UP (ref 2–14)
MONOCYTES NFR BLD AUTO: 8.9 % — SIGNIFICANT CHANGE UP (ref 2–14)
NEUTROPHILS # BLD AUTO: 5.99 K/UL — SIGNIFICANT CHANGE UP (ref 1.8–7.4)
NEUTROPHILS # BLD AUTO: 5.99 K/UL — SIGNIFICANT CHANGE UP (ref 1.8–7.4)
NEUTROPHILS NFR BLD AUTO: 66.1 % — SIGNIFICANT CHANGE UP (ref 43–77)
NEUTROPHILS NFR BLD AUTO: 66.1 % — SIGNIFICANT CHANGE UP (ref 43–77)
NITRITE UR-MCNC: NEGATIVE — SIGNIFICANT CHANGE UP
NITRITE UR-MCNC: NEGATIVE — SIGNIFICANT CHANGE UP
PH UR: 7 — SIGNIFICANT CHANGE UP (ref 5–8)
PH UR: 7 — SIGNIFICANT CHANGE UP (ref 5–8)
PLATELET # BLD AUTO: 198 K/UL — SIGNIFICANT CHANGE UP (ref 150–400)
PLATELET # BLD AUTO: 198 K/UL — SIGNIFICANT CHANGE UP (ref 150–400)
POTASSIUM SERPL-MCNC: 4 MMOL/L — SIGNIFICANT CHANGE UP (ref 3.5–5.3)
POTASSIUM SERPL-MCNC: 4 MMOL/L — SIGNIFICANT CHANGE UP (ref 3.5–5.3)
POTASSIUM SERPL-SCNC: 4 MMOL/L — SIGNIFICANT CHANGE UP (ref 3.5–5.3)
POTASSIUM SERPL-SCNC: 4 MMOL/L — SIGNIFICANT CHANGE UP (ref 3.5–5.3)
PROT SERPL-MCNC: 6.8 GM/DL — SIGNIFICANT CHANGE UP (ref 6–8.3)
PROT SERPL-MCNC: 6.8 GM/DL — SIGNIFICANT CHANGE UP (ref 6–8.3)
PROT UR-MCNC: NEGATIVE MG/DL — SIGNIFICANT CHANGE UP
PROT UR-MCNC: NEGATIVE MG/DL — SIGNIFICANT CHANGE UP
RBC # BLD: 4.21 M/UL — SIGNIFICANT CHANGE UP (ref 4.2–5.8)
RBC # BLD: 4.21 M/UL — SIGNIFICANT CHANGE UP (ref 4.2–5.8)
RBC # FLD: 12.6 % — SIGNIFICANT CHANGE UP (ref 10.3–14.5)
RBC # FLD: 12.6 % — SIGNIFICANT CHANGE UP (ref 10.3–14.5)
SODIUM SERPL-SCNC: 145 MMOL/L — SIGNIFICANT CHANGE UP (ref 135–145)
SODIUM SERPL-SCNC: 145 MMOL/L — SIGNIFICANT CHANGE UP (ref 135–145)
SP GR SPEC: 1.02 — SIGNIFICANT CHANGE UP (ref 1–1.03)
SP GR SPEC: 1.02 — SIGNIFICANT CHANGE UP (ref 1–1.03)
UROBILINOGEN FLD QL: 0.2 MG/DL — SIGNIFICANT CHANGE UP (ref 0.2–1)
UROBILINOGEN FLD QL: 0.2 MG/DL — SIGNIFICANT CHANGE UP (ref 0.2–1)
WBC # BLD: 9.07 K/UL — SIGNIFICANT CHANGE UP (ref 3.8–10.5)
WBC # BLD: 9.07 K/UL — SIGNIFICANT CHANGE UP (ref 3.8–10.5)
WBC # FLD AUTO: 9.07 K/UL — SIGNIFICANT CHANGE UP (ref 3.8–10.5)
WBC # FLD AUTO: 9.07 K/UL — SIGNIFICANT CHANGE UP (ref 3.8–10.5)

## 2023-11-05 PROCEDURE — 85025 COMPLETE CBC W/AUTO DIFF WBC: CPT

## 2023-11-05 PROCEDURE — 74176 CT ABD & PELVIS W/O CONTRAST: CPT | Mod: 26,MA

## 2023-11-05 PROCEDURE — 96374 THER/PROPH/DIAG INJ IV PUSH: CPT

## 2023-11-05 PROCEDURE — 87086 URINE CULTURE/COLONY COUNT: CPT

## 2023-11-05 PROCEDURE — 99284 EMERGENCY DEPT VISIT MOD MDM: CPT

## 2023-11-05 PROCEDURE — 80053 COMPREHEN METABOLIC PANEL: CPT

## 2023-11-05 PROCEDURE — 36415 COLL VENOUS BLD VENIPUNCTURE: CPT

## 2023-11-05 PROCEDURE — 81003 URINALYSIS AUTO W/O SCOPE: CPT

## 2023-11-05 PROCEDURE — 74176 CT ABD & PELVIS W/O CONTRAST: CPT | Mod: MA

## 2023-11-05 PROCEDURE — 99285 EMERGENCY DEPT VISIT HI MDM: CPT | Mod: 25

## 2023-11-05 PROCEDURE — 93010 ELECTROCARDIOGRAM REPORT: CPT

## 2023-11-05 PROCEDURE — 93005 ELECTROCARDIOGRAM TRACING: CPT

## 2023-11-05 RX ORDER — ACETAMINOPHEN 500 MG
2 TABLET ORAL
Qty: 24 | Refills: 0
Start: 2023-11-05 | End: 2023-11-07

## 2023-11-05 RX ORDER — CYCLOBENZAPRINE HYDROCHLORIDE 10 MG/1
5 TABLET, FILM COATED ORAL THREE TIMES A DAY
Refills: 0 | Status: DISCONTINUED | OUTPATIENT
Start: 2023-11-05 | End: 2023-11-05

## 2023-11-05 RX ORDER — CYCLOBENZAPRINE HYDROCHLORIDE 10 MG/1
1 TABLET, FILM COATED ORAL
Qty: 9 | Refills: 0
Start: 2023-11-05 | End: 2023-11-07

## 2023-11-05 RX ORDER — KETOROLAC TROMETHAMINE 30 MG/ML
15 SYRINGE (ML) INJECTION ONCE
Refills: 0 | Status: DISCONTINUED | OUTPATIENT
Start: 2023-11-05 | End: 2023-11-05

## 2023-11-05 RX ADMIN — Medication 15 MILLIGRAM(S): at 16:54

## 2023-11-05 RX ADMIN — CYCLOBENZAPRINE HYDROCHLORIDE 5 MILLIGRAM(S): 10 TABLET, FILM COATED ORAL at 16:52

## 2023-11-05 RX ADMIN — Medication 15 MILLIGRAM(S): at 16:52

## 2023-11-05 NOTE — ED ADULT TRIAGE NOTE - GLASGOW COMA SCALE: BEST VERBAL RESPONSE, MLM
SUBJECTIVE:                                                    Reanna Valentine is a 8 year old female who presents to clinic today for the following health issues:    Acute Illness   Acute illness concerns: sore throat  Onset: URI symptoms last week, sore throat today    Fever: YES    Chills/Sweats: no    Headache (location?): no    Sinus Pressure:no    Conjunctivitis:  no    Ear Pain: no    Rhinorrhea: no    Congestion: no    Sore Throat: YES     Cough: no    Wheeze: no    Decreased Appetite: no    Nausea: no    Vomiting: no    Diarrhea:  no    Dysuria/Freq.: no    Fatigue/Achiness: no    Sick/Strep Exposure: YES     Therapies Tried and outcome: Ibuprofen        Problem list and histories reviewed & adjusted, as indicated.  Additional history: as documented    There is no problem list on file for this patient.    Past Surgical History   Procedure Laterality Date     Irrigation,debridement open fracture with closed reduction of monteggia fracture dislocation and percutaneous intramedullary fixation of ulnar fracture  05/28/2011     LT, forearm monteggia fracture dislocation which was open       Social History   Substance Use Topics     Smoking status: Never Smoker     Smokeless tobacco: Never Used      Comment: PASSIVE SMOKE EXPOSURE     Alcohol use No     Family History   Problem Relation Age of Onset     Hypertension Father      DIABETES Other      Asthma No family hx of      Thyroid Disease No family hx of          Current Outpatient Prescriptions   Medication Sig Dispense Refill     loratadine (CLARITIN) 10 MG tablet Take 0.5 tablets (5 mg) by mouth daily 30 tablet 11     Allergies   Allergen Reactions     Lactose        Reviewed and updated as needed this visit by clinical staff  Tobacco  Allergies  Med Hx  Surg Hx  Fam Hx       Reviewed and updated as needed this visit by Provider         ROS:  Constitutional, HEENT, cardiovascular, pulmonary, gi and gu systems are negative, except as otherwise  noted.    OBJECTIVE:                                                    BP 98/54 (Cuff Size: Child)  Pulse 88  Temp 100.2  F (37.9  C) (Tympanic)  Resp 18  Wt 69 lb (31.3 kg)  SpO2 98%  There is no height or weight on file to calculate BMI.  GENERAL: alert and no distress  EYES: Eyes grossly normal to inspection, PERRL and conjunctivae and sclerae normal  HENT: normal cephalic/atraumatic, ear canals and TM's normal, nose and mouth without ulcers or lesions, tonsillar erythema and tonsillar exudate  NECK: no adenopathy  RESP: lungs clear to auscultation - no rales, rhonchi or wheezes  CV: regular rate and rhythm, normal S1 S2, no S3 or S4, no murmur, click or rub, no peripheral edema and peripheral pulses strong    Diagnostic Test Results:  Strep screen - Positive     ASSESSMENT/PLAN:                                                      1. Acute streptococcal pharyngitis  Bicillin given, patient tolerated injection well.  Contact precautions and symptomatic cares discussed.  Follow-up as needed  - C INJECTION, PENICILLIN G BENZATHINE [id number] UNITS  - INJECTION INTRAMUSCULAR OR SUB-Q  - penicillin G benzathine & procaine (PENICILLIN G PROCAINE-PENICILLIN G BENZATHINE, BICILLIN-CR, IM INJECTION 600/600 UNIT/2 ML SYRINGE) [id number] UNIT/2ML SUSP injection; Inject 2 mLs (1.2 Million Units) into the muscle once for 1 dose  Dispense: 2 mL; Refill: 0    2. Throat pain  - Rapid strep screen        Ale Meeks MD  [facility]     (V5) oriented

## 2023-11-05 NOTE — ED PROVIDER NOTE - CLINICAL SUMMARY MEDICAL DECISION MAKING FREE TEXT BOX
84 y/o male with PMHx of CAD, cerebellar infarct, COPD, HLD, HTN, hypercholesteremia, prostate cancer s/p radiation, angioplasty s/p stent; presents to the ED c/o atraumatic right hip pain that radiates down to the top of the right knee and lower right back onset 11/3 with difficulty ambulating. Rule out fracture most likely muscular pain. Plan labs, imaging, pain control and reassess.

## 2023-11-05 NOTE — ED ADULT TRIAGE NOTE - GLASGOW COMA SCALE: EYE OPENING, MLM
(E4) spontaneous pt c/o lof and reduced fetal movement, on evaluation, no evidence of rupture, positive fetal movement reported.  pt d/c to home with instructions given by maia callowya.

## 2023-11-05 NOTE — ED ADULT NURSE NOTE - SUICIDE SCREENING QUESTION 2
Next appt 9/18/17  Last appt 3/16/17    Refill request for  atorvastatin (LIPITOR) 20 MG tablet 90 tablet 3 3/29/2016       Sig - Route: Take 1 tablet by mouth nightly. - Oral       Refilled per standing med protocol.    
Patient unable to complete

## 2023-11-05 NOTE — ED PROVIDER NOTE - PATIENT PORTAL LINK FT
You can access the FollowMyHealth Patient Portal offered by [facility] by registering at the following website: [email or web address]. By joining [employer]’s FollowMyHealth portal, you will also be able to view your health information using other applications (apps) compatible with our system.

## 2023-11-05 NOTE — ED ADULT NURSE NOTE - OBJECTIVE STATEMENT
pt presenting with leg pain/flank pain. denies injury and ambulatory @ per baseline w/walker. Pt tremulous. MD @ bedside pt pnd imaging

## 2023-11-05 NOTE — ED ADULT TRIAGE NOTE - CHIEF COMPLAINT QUOTE
Pt. A&OX3, presenting to the ER with c/o right hip pain/injury. Pt's wife reports that he was here 3 weeks ago with blood clots in the lungs. Friday started having right hip pain and difficulty walking.   Denies falls, numbness or tingling in the leg.

## 2023-11-05 NOTE — ED PROVIDER NOTE - PROGRESS NOTE DETAILS
Patient reevaluated bedside.  CT imaging negative for any acute pathology.  Pain well controlled after pain medication and muscle relaxant.   pain most likely muscle strain.   Walking with a stable gait. Will discharge with PMD follow-up

## 2023-11-05 NOTE — ED PROVIDER NOTE - NS ED ROS FT
General: No fever, no nausea, no vomiting  HEENT: No loc, no ha, no dizziness  Respiratory: no trouble breathing  Cardiovascular: No chest pain  GI: No abdominal pain, no diarrhea  : No urinary complaints  Endocrine: no generalized weakness  Neurological: No weakness, numbness  MSK: no recent trauma, +right hip pain, +right lower back

## 2023-11-05 NOTE — ED PROVIDER NOTE - NS_ ATTENDINGSCRIBEDETAILS _ED_A_ED_FT
I, Kathy Davis DO,  performed the initial face to face bedside interview with this patient regarding history of present illness, review of symptoms and relevant past medical, social and family history.  I completed an independent physical examination.  I was the initial provider who evaluated this patient.   I personally saw the patient and performed a substantive portion of the visit including all aspects of the medical decision making.  The history, relevant review of systems, past medical and surgical history, medical decision making, and physical examination was documented by the scribe in my presence and I attest to the accuracy of the documentation.

## 2023-11-05 NOTE — ED ADULT NURSE NOTE - NSFALLHARMRISKINTERV_ED_ALL_ED

## 2023-11-05 NOTE — ED PROVIDER NOTE - PHYSICAL EXAMINATION
General: Patient in no acute distress, AAOX3.   HENMT: NC/AT, no nasal congestion, MMM  Neck: supple  CVS: regular rate and rhythm, no murmur  Resp: Good air entry bilaterally, No wheeze/rhonchi.  Abd: Soft non tender, non distended, +bowel sounds, No guarding, rebound tenderness   Ext: FROM in all ext, 2+ pulses throughout, cap refill<2 sec.  BACK: no midline tenderness, no stepoffs, no CVA TTP, +TTP right sacral joint area  NEURO: no focal deficit, gross motor and sensory intact throughout, gait stable. General: Patient in no acute distress, AAOX3.   HENMT: NC/AT, no nasal congestion, MMM  Neck: supple  CVS: regular rate and rhythm, no murmur  Resp: Good air entry bilaterally, No wheeze/rhonchi.  Abd: Soft non tender, non distended, +bowel sounds, No guarding, rebound tenderness   Ext: FROM in all ext, 2+ pulses throughout, cap refill<2 sec.  BACK: no midline tenderness, no stepoffs, no CVA TTP, +TTP right SI joint area  NEURO: no focal deficit, gross motor and sensory intact throughout, gait stable.

## 2023-11-05 NOTE — ED PROVIDER NOTE - OBJECTIVE STATEMENT
82 y/o male with PMHx of CAD, cerebellar infarct, COPD, HLD, HTN, hypercholesteremia, prostate cancer s/p radiation, angioplasty s/p stent; presents to the ED c/o right hip pain that radiates down to the top of the right knee and lower right back onset 11/3 with difficulty ambulating. Patient noticed the pain when he was trying to lie down in bed. Denies abdominal pain, urinary symptoms, chest pain, SOB, falls, trauma, numbness and tingling. Wife reports patient was at  x3 weeks ago for blood clots in the lungs.

## 2023-11-06 LAB
CULTURE RESULTS: NO GROWTH — SIGNIFICANT CHANGE UP
CULTURE RESULTS: NO GROWTH — SIGNIFICANT CHANGE UP
SPECIMEN SOURCE: SIGNIFICANT CHANGE UP
SPECIMEN SOURCE: SIGNIFICANT CHANGE UP

## 2023-11-09 ENCOUNTER — APPOINTMENT (OUTPATIENT)
Dept: ORTHOPEDIC SURGERY | Facility: CLINIC | Age: 83
End: 2023-11-09
Payer: MEDICARE

## 2023-11-09 VITALS — BODY MASS INDEX: 31 KG/M2 | HEIGHT: 70.5 IN | WEIGHT: 219 LBS

## 2023-11-09 DIAGNOSIS — M25.559 PAIN IN UNSPECIFIED HIP: ICD-10-CM

## 2023-11-09 DIAGNOSIS — M70.61 TROCHANTERIC BURSITIS, RIGHT HIP: ICD-10-CM

## 2023-11-09 PROCEDURE — 99203 OFFICE O/P NEW LOW 30 MIN: CPT | Mod: 25

## 2023-11-09 PROCEDURE — 73502 X-RAY EXAM HIP UNI 2-3 VIEWS: CPT

## 2023-11-09 PROCEDURE — 20610 DRAIN/INJ JOINT/BURSA W/O US: CPT | Mod: RT

## 2023-11-11 PROBLEM — M70.61 GREATER TROCHANTERIC BURSITIS OF RIGHT HIP: Status: ACTIVE | Noted: 2023-11-11

## 2024-01-23 ENCOUNTER — APPOINTMENT (OUTPATIENT)
Dept: FAMILY MEDICINE | Facility: CLINIC | Age: 84
End: 2024-01-23
Payer: MEDICARE

## 2024-01-23 VITALS
WEIGHT: 219 LBS | TEMPERATURE: 97.1 F | OXYGEN SATURATION: 96 % | HEART RATE: 74 BPM | BODY MASS INDEX: 31 KG/M2 | SYSTOLIC BLOOD PRESSURE: 110 MMHG | HEIGHT: 70.5 IN | DIASTOLIC BLOOD PRESSURE: 62 MMHG

## 2024-01-23 PROCEDURE — 99213 OFFICE O/P EST LOW 20 MIN: CPT

## 2024-01-23 RX ORDER — LIDOCAINE 5% 700 MG/1
5 PATCH TOPICAL
Qty: 3 | Refills: 0 | Status: ACTIVE | COMMUNITY
Start: 2024-01-23 | End: 1900-01-01

## 2024-01-23 NOTE — PLAN
[FreeTextEntry1] : - discussed how to take muscle relaxer, educated to be careful with higher risk of falls - heating pad - if no improvement with current plan, will consider oral steroid

## 2024-01-23 NOTE — HISTORY OF PRESENT ILLNESS
[FreeTextEntry8] : - a week ago developed sciatica while in [place]  - went to urgent care 2 days after that, got Lidocaine patch and Tramadol for pain  - states that not really helping, very mild temporary relief  - sedentary a lot, was in [place] for a week already before it started  - no injury or trauma - never happened before  - lower back pain radiating to right ankle, worse with getting up and movement  - sitting down is OK  - denies bladder or bowel changes  - tylenol PRN without much relief

## 2024-01-23 NOTE — PHYSICAL EXAM
[No Joint Swelling] : no joint swelling [Normal] : affect was normal and insight and judgment were intact [de-identified] : no tenderness with palpation over right lower back. decreased ROM due to pain

## 2024-04-04 ENCOUNTER — APPOINTMENT (OUTPATIENT)
Dept: FAMILY MEDICINE | Facility: CLINIC | Age: 84
End: 2024-04-04
Payer: MEDICARE

## 2024-04-04 VITALS
TEMPERATURE: 97.8 F | DIASTOLIC BLOOD PRESSURE: 72 MMHG | OXYGEN SATURATION: 97 % | WEIGHT: 209 LBS | HEART RATE: 80 BPM | HEIGHT: 70.5 IN | SYSTOLIC BLOOD PRESSURE: 122 MMHG | BODY MASS INDEX: 29.59 KG/M2

## 2024-04-04 DIAGNOSIS — I10 ESSENTIAL (PRIMARY) HYPERTENSION: ICD-10-CM

## 2024-04-04 DIAGNOSIS — E78.00 PURE HYPERCHOLESTEROLEMIA, UNSPECIFIED: ICD-10-CM

## 2024-04-04 DIAGNOSIS — E11.9 TYPE 2 DIABETES MELLITUS W/OUT COMPLICATIONS: ICD-10-CM

## 2024-04-04 DIAGNOSIS — I63.549 CEREBRAL INFARCTION DUE TO UNSPECIFIED OCCLUSION OR STENOSIS OF UNSPECIFIED CEREBELLAR ARTERY: ICD-10-CM

## 2024-04-04 DIAGNOSIS — I25.10 ATHEROSCLEROTIC HEART DISEASE OF NATIVE CORONARY ARTERY W/OUT ANGINA PECTORIS: ICD-10-CM

## 2024-04-04 DIAGNOSIS — Z00.00 ENCOUNTER FOR GENERAL ADULT MEDICAL EXAMINATION W/OUT ABNORMAL FINDINGS: ICD-10-CM

## 2024-04-04 DIAGNOSIS — J30.0 VASOMOTOR RHINITIS: ICD-10-CM

## 2024-04-04 PROCEDURE — G0439: CPT

## 2024-04-04 PROCEDURE — 36415 COLL VENOUS BLD VENIPUNCTURE: CPT

## 2024-04-04 RX ORDER — IPRATROPIUM BROMIDE 21 UG/1
0.03 SPRAY NASAL TWICE DAILY
Qty: 1 | Refills: 11 | Status: ACTIVE | COMMUNITY
Start: 2024-04-04 | End: 1900-01-01

## 2024-04-04 RX ORDER — CYCLOBENZAPRINE HYDROCHLORIDE 5 MG/1
5 TABLET, FILM COATED ORAL
Qty: 5 | Refills: 0 | Status: COMPLETED | COMMUNITY
Start: 2024-01-23 | End: 2024-04-04

## 2024-04-04 RX ORDER — ERGOCALCIFEROL 1.25 MG/1
CAPSULE ORAL
Refills: 0 | Status: COMPLETED | COMMUNITY
End: 2024-04-04

## 2024-04-04 NOTE — HISTORY OF PRESENT ILLNESS
[Spouse] : spouse [FreeTextEntry1] : ELENA BROWN is a 83 year old male here for a physical exam. [de-identified] : His last physical exam was 7/2022  Vaccines: Tetanus is up to date, 9/12/17 Pneumococcal vaccination is up to date Shingrix is up to date  His last dentist visit was less than one year ago His last eye doctor appointment was less than one year ago, Dr. Goodson His last dermatologist visit was less than one year ago, Dr. Stephens  Colon cancer screening is no longer indicated due to age  His diet is healthy overall Exercise: rarely

## 2024-04-04 NOTE — PHYSICAL EXAM
[No Carotid Bruits] : no carotid bruits [Soft] : abdomen soft [Non Tender] : non-tender [No Spinal Tenderness] : no spinal tenderness [No Joint Swelling] : no joint swelling [Grossly Normal Strength/Tone] : grossly normal strength/tone [No Rash] : no rash [No Focal Deficits] : no focal deficits [Normal] : affect was normal and insight and judgment were intact [de-identified] : 1+ BLE edema

## 2024-04-04 NOTE — PLAN
[FreeTextEntry1] : Continue all medications as prescribed. Check labs as above. Will adjust any medications based upon lab results. Will try ipratropium nasal spray for vasomotor rhinitis.  Reviewed age-appropriate preventive screening tests with patient.  Discussed clean eating (eg Mediterranean style eating plan) and regular exercise/staying as physically active as possible.  Include balance exercises and strength training and core strengthening exercises for bone health and to decrease risk for falls.  Reviewed importance of good self care (e.g. meditation, yoga, adequate rest, regular exercise, magnesium, clean eating, etc.).  Follow up for next physical in one year.

## 2024-04-04 NOTE — ASSESSMENT
[FreeTextEntry1] : ELENA BROWN is a 83 year old male here for a physical exam.  He has a history of coronary artery disease, hypercholesterolemia, hypertension, diabetes, GERD, and a CVA. 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  He sees a cardiologist (Chaparrita Lezama, [facility]) regularly and does not need an EKG today. He has also seen his pulmonologist and neurologist.  He reports chronic rhinorrhea, consistent with vasomotor rhinitis. This has not improved with Claritin and Flonase.  He has lost over 30 pounds since last year. His wife states he is eating less. He is not exercising however. He goes to PT to improve his balance and gait but he is not exercising outside of PT. His wife is concerned that he cannot even walk from their house to the corner (3 houses down) without stopping. The patient states he is "just lazy" but agrees to try more walking.

## 2024-04-04 NOTE — HEALTH RISK ASSESSMENT
[No falls in past year] : Patient reported no falls in the past year [0] : 2) Feeling down, depressed, or hopeless: Not at all (0) [PHQ-2 Negative - No further assessment needed] : PHQ-2 Negative - No further assessment needed [Former] : Former [20 or more] : 20 or more [> 15 Years] : > 15 Years [[id number]] : 0

## 2024-04-06 LAB
ALBUMIN SERPL ELPH-MCNC: 3.6 G/DL
ALP BLD-CCNC: 60 U/L
ALT SERPL-CCNC: 12 U/L
ANION GAP SERPL CALC-SCNC: 10 MMOL/L
AST SERPL-CCNC: 14 U/L
BASOPHILS # BLD AUTO: 0.07 K/UL
BASOPHILS NFR BLD AUTO: 0.7 %
BILIRUB SERPL-MCNC: 0.2 MG/DL
BUN SERPL-MCNC: 18 MG/DL
CALCIUM SERPL-MCNC: 9 MG/DL
CHLORIDE SERPL-SCNC: 105 MMOL/L
CHOLEST SERPL-MCNC: 141 MG/DL
CO2 SERPL-SCNC: 26 MMOL/L
CREAT SERPL-MCNC: 0.79 MG/DL
EGFR: 87 ML/MIN/1.73M2
EOSINOPHIL # BLD AUTO: 0.27 K/UL
EOSINOPHIL NFR BLD AUTO: 2.7 %
ESTIMATED AVERAGE GLUCOSE: 114 MG/DL
GLUCOSE SERPL-MCNC: 93 MG/DL
HBA1C MFR BLD HPLC: 5.6 %
HCT VFR BLD CALC: 36.9 %
HDLC SERPL-MCNC: 46 MG/DL
HGB BLD-MCNC: 12.3 G/DL
IMM GRANULOCYTES NFR BLD AUTO: 0.6 %
LDLC SERPL CALC-MCNC: 69 MG/DL
LYMPHOCYTES # BLD AUTO: 2.22 K/UL
LYMPHOCYTES NFR BLD AUTO: 22.4 %
MAN DIFF?: NORMAL
MCHC RBC-ENTMCNC: 30.5 PG
MCHC RBC-ENTMCNC: 33.3 GM/DL
MCV RBC AUTO: 91.6 FL
MONOCYTES # BLD AUTO: 0.73 K/UL
MONOCYTES NFR BLD AUTO: 7.4 %
NEUTROPHILS # BLD AUTO: 6.54 K/UL
NEUTROPHILS NFR BLD AUTO: 66.2 %
NONHDLC SERPL-MCNC: 95 MG/DL
PLATELET # BLD AUTO: 178 K/UL
POTASSIUM SERPL-SCNC: 4.1 MMOL/L
PROT SERPL-MCNC: 6.3 G/DL
RBC # BLD: 4.03 M/UL
RBC # FLD: 13.2 %
SODIUM SERPL-SCNC: 141 MMOL/L
TRIGL SERPL-MCNC: 151 MG/DL
WBC # FLD AUTO: 9.89 K/UL

## 2024-04-20 NOTE — PROGRESS NOTE ADULT - ASSESSMENT
82 y/o male PMH CAD with PCI, systolic CHF, CVA on DAPT, HTN, HLD, daily cigar smoker, sedentary lifestyle, presented with SOB and diaphoresis     Found to have a saddle PE with RV strain   Was on low dose levo for brief period      Plan:     Neuro - AAOx3. Continue plavix     CV, resp - BP stable off levo. TTE reviewed. Continue IV heparin per protocol    GI - tolerating diet     Renal - monitor fn, lytes     ID - no issues     Heme - extensive RLE DVT, iv heparin as above     OOB     GOC discussed by CCPA - patient and family not sure at the moment, remains full code       Keep in ICU today  Chief Complaint   Patient presents with    Migraine     Has a history of them, this one started this morning, nothing is making it better or worse       HISTORY OF PRESENT ILLNESS:   The patient is a 48 year old female who presents with less than 1 day acute onset headache.  Patient states it feels like her typical migraine headache.  Patient states it is currently located over the left frontal region, throbbing sensation gradual onset.  Headache is worse with activity, better with rest.  Patient tried 2 tablets of her Maxalt today but has not gotten any significant relief.  Patient typically responds well to Toradol when her migraines get out of control.  Patient rates her headache right now is to be a 7/10 severity.  Patient states she does not feel well enough to go to work, requesting a work excuse.    PAST MEDICAL HISTORY, PAST SURGICAL HISTORY, SOCIAL HISTORY, MEDICATIONS, AND ALLERGIES:  Reviewed per electronic chart.    REVIEW OF SYSTEMS:   Constitutional:  Denies fever, body aches, fatigue or chills   ENT: Denies runny nose, sinus congestion, sore throat or ear pain.  Respiratory:  Denies cough, wheezing or shortness of breath   Cardiovascular:  Denies chest pain or edema   Gastrointestinal:  Denies abdominal pain, vomiting, bloody stools or diarrhea   Neurologic:  Headache.  Denies focal weakness or sensory changes     PHYSICAL EXAM:  Vitals:   Vitals:    04/19/24 1933   BP: (!) 149/96   BP Location: LUE - Left upper extremity   Patient Position: Sitting   Cuff Size: Regular   Pulse: 75   Resp: 16   Temp: 96.9 °F (36.1 °C)   SpO2: 100%   Weight: 95.7 kg (211 lb)   Height: 5' 10\" (1.778 m)      Constitutional:  No acute distress, nontoxic appearance.  HENT: Normocephalic, atraumatic. Bilateral external ears normal. Oropharynx moist. No oral exudates. Nasal mucosa appear normal without discharge.  Membranes appear normal, no erythema or exudates.  Eyes:  PERRLA (pupils equal, round, and reactive to light and  accommodation), EOMI (extraocular movements are intact). Conjunctivae normal. No discharge.  Neck:  Normal range of motion. No tenderness. Supple. No lymphadenopathy. No stridor.  Cardiovascular:  Normal heart rate. Normal rhythm. No murmurs. No rubs. No gallops.  Pulmonary/Chest:  Clear to auscultation throughout. No respiratory distress. No wheezing.  Skin:  Warm, dry. No erythema. No rash.  Neuro: Alert and orientated x3. Cranial nerves II-XII are intact. Speech is clear.    Labs/Radiology:  Orders Placed This Encounter    ketorolac (TORADOL) injection 30 mg       ASSESSMENT:   1. Intractable migraine without aura and without status migrainosus    2. Encounter to obtain excuse from work        PLAN:  Discussed anticipatory guidance and home supportive cares.  Encouraged adequate hydration and relative rest.  Patient received 30 mg of Toradol IM today in the clinic.  Work excuse provided to patient.  Educational handout is available in [employer] to review.    Followup with primary if no improvement of symptoms or worsening. Patient acknowledged understanding of the instructions and is agreeable plan.    Collaborating physician is Dr. Justus Parikh.

## 2024-05-24 ENCOUNTER — EMERGENCY (EMERGENCY)
Facility: HOSPITAL | Age: 84
LOS: 0 days | Discharge: HOME | End: 2024-05-24
Attending: STUDENT IN AN ORGANIZED HEALTH CARE EDUCATION/TRAINING PROGRAM
Payer: MEDICARE

## 2024-05-24 VITALS
RESPIRATION RATE: 18 BRPM | OXYGEN SATURATION: 98 % | DIASTOLIC BLOOD PRESSURE: 92 MMHG | TEMPERATURE: 100 F | WEIGHT: 210.1 LBS | HEART RATE: 72 BPM | HEIGHT: 67 IN | SYSTOLIC BLOOD PRESSURE: 179 MMHG

## 2024-05-24 VITALS
OXYGEN SATURATION: 98 % | HEART RATE: 76 BPM | RESPIRATION RATE: 19 BRPM | SYSTOLIC BLOOD PRESSURE: 158 MMHG | TEMPERATURE: 99 F | DIASTOLIC BLOOD PRESSURE: 77 MMHG

## 2024-05-24 DIAGNOSIS — I25.10 ATHEROSCLEROTIC HEART DISEASE OF NATIVE CORONARY ARTERY WITHOUT ANGINA PECTORIS: ICD-10-CM

## 2024-05-24 DIAGNOSIS — Z86.73 PERSONAL HISTORY OF TRANSIENT ISCHEMIC ATTACK (TIA), AND CEREBRAL INFARCTION WITHOUT RESIDUAL DEFICITS: ICD-10-CM

## 2024-05-24 DIAGNOSIS — Z95.5 PRESENCE OF CORONARY ANGIOPLASTY IMPLANT AND GRAFT: ICD-10-CM

## 2024-05-24 DIAGNOSIS — E78.00 PURE HYPERCHOLESTEROLEMIA, UNSPECIFIED: ICD-10-CM

## 2024-05-24 DIAGNOSIS — R10.11 RIGHT UPPER QUADRANT PAIN: ICD-10-CM

## 2024-05-24 DIAGNOSIS — M54.50 LOW BACK PAIN, UNSPECIFIED: ICD-10-CM

## 2024-05-24 DIAGNOSIS — Z88.5 ALLERGY STATUS TO NARCOTIC AGENT: ICD-10-CM

## 2024-05-24 DIAGNOSIS — M54.6 PAIN IN THORACIC SPINE: ICD-10-CM

## 2024-05-24 DIAGNOSIS — J44.9 CHRONIC OBSTRUCTIVE PULMONARY DISEASE, UNSPECIFIED: ICD-10-CM

## 2024-05-24 DIAGNOSIS — I10 ESSENTIAL (PRIMARY) HYPERTENSION: ICD-10-CM

## 2024-05-24 LAB
ALBUMIN SERPL ELPH-MCNC: 3.3 G/DL — SIGNIFICANT CHANGE UP (ref 3.3–5)
ALP SERPL-CCNC: 50 U/L — SIGNIFICANT CHANGE UP (ref 40–120)
ALT FLD-CCNC: 19 U/L — SIGNIFICANT CHANGE UP (ref 12–78)
ANION GAP SERPL CALC-SCNC: 6 MMOL/L — SIGNIFICANT CHANGE UP (ref 5–17)
APPEARANCE UR: CLEAR — SIGNIFICANT CHANGE UP
AST SERPL-CCNC: 18 U/L — SIGNIFICANT CHANGE UP (ref 15–37)
BASOPHILS # BLD AUTO: 0.05 K/UL — SIGNIFICANT CHANGE UP (ref 0–0.2)
BASOPHILS NFR BLD AUTO: 0.5 % — SIGNIFICANT CHANGE UP (ref 0–2)
BILIRUB SERPL-MCNC: 0.5 MG/DL — SIGNIFICANT CHANGE UP (ref 0.2–1.2)
BILIRUB UR-MCNC: NEGATIVE — SIGNIFICANT CHANGE UP
BUN SERPL-MCNC: 19 MG/DL — SIGNIFICANT CHANGE UP (ref 7–23)
CALCIUM SERPL-MCNC: 8.9 MG/DL — SIGNIFICANT CHANGE UP (ref 8.5–10.1)
CHLORIDE SERPL-SCNC: 111 MMOL/L — HIGH (ref 96–108)
CO2 SERPL-SCNC: 25 MMOL/L — SIGNIFICANT CHANGE UP (ref 22–31)
COLOR SPEC: YELLOW — SIGNIFICANT CHANGE UP
CREAT SERPL-MCNC: 0.88 MG/DL — SIGNIFICANT CHANGE UP (ref 0.5–1.3)
DIFF PNL FLD: NEGATIVE — SIGNIFICANT CHANGE UP
EGFR: 84 ML/MIN/1.73M2 — SIGNIFICANT CHANGE UP
EOSINOPHIL # BLD AUTO: 0.04 K/UL — SIGNIFICANT CHANGE UP (ref 0–0.5)
EOSINOPHIL NFR BLD AUTO: 0.4 % — SIGNIFICANT CHANGE UP (ref 0–6)
GLUCOSE SERPL-MCNC: 126 MG/DL — HIGH (ref 70–99)
GLUCOSE UR QL: NEGATIVE MG/DL — SIGNIFICANT CHANGE UP
HCT VFR BLD CALC: 38.3 % — LOW (ref 39–50)
HGB BLD-MCNC: 12.8 G/DL — LOW (ref 13–17)
IMM GRANULOCYTES NFR BLD AUTO: 0.4 % — SIGNIFICANT CHANGE UP (ref 0–0.9)
KETONES UR-MCNC: ABNORMAL MG/DL
LEUKOCYTE ESTERASE UR-ACNC: NEGATIVE — SIGNIFICANT CHANGE UP
LYMPHOCYTES # BLD AUTO: 1.3 K/UL — SIGNIFICANT CHANGE UP (ref 1–3.3)
LYMPHOCYTES # BLD AUTO: 12.7 % — LOW (ref 13–44)
MCHC RBC-ENTMCNC: 30.3 PG — SIGNIFICANT CHANGE UP (ref 27–34)
MCHC RBC-ENTMCNC: 33.4 GM/DL — SIGNIFICANT CHANGE UP (ref 32–36)
MCV RBC AUTO: 90.8 FL — SIGNIFICANT CHANGE UP (ref 80–100)
MONOCYTES # BLD AUTO: 0.62 K/UL — SIGNIFICANT CHANGE UP (ref 0–0.9)
MONOCYTES NFR BLD AUTO: 6 % — SIGNIFICANT CHANGE UP (ref 2–14)
NEUTROPHILS # BLD AUTO: 8.22 K/UL — HIGH (ref 1.8–7.4)
NEUTROPHILS NFR BLD AUTO: 80 % — HIGH (ref 43–77)
NITRITE UR-MCNC: NEGATIVE — SIGNIFICANT CHANGE UP
PH UR: 7 — SIGNIFICANT CHANGE UP (ref 5–8)
PLATELET # BLD AUTO: 182 K/UL — SIGNIFICANT CHANGE UP (ref 150–400)
POTASSIUM SERPL-MCNC: 3.7 MMOL/L — SIGNIFICANT CHANGE UP (ref 3.5–5.3)
POTASSIUM SERPL-SCNC: 3.7 MMOL/L — SIGNIFICANT CHANGE UP (ref 3.5–5.3)
PROT SERPL-MCNC: 7.1 GM/DL — SIGNIFICANT CHANGE UP (ref 6–8.3)
PROT UR-MCNC: NEGATIVE MG/DL — SIGNIFICANT CHANGE UP
RBC # BLD: 4.22 M/UL — SIGNIFICANT CHANGE UP (ref 4.2–5.8)
RBC # FLD: 12.7 % — SIGNIFICANT CHANGE UP (ref 10.3–14.5)
SODIUM SERPL-SCNC: 142 MMOL/L — SIGNIFICANT CHANGE UP (ref 135–145)
SP GR SPEC: >1.03 — HIGH (ref 1–1.03)
UROBILINOGEN FLD QL: 0.2 MG/DL — SIGNIFICANT CHANGE UP (ref 0.2–1)
WBC # BLD: 10.27 K/UL — SIGNIFICANT CHANGE UP (ref 3.8–10.5)
WBC # FLD AUTO: 10.27 K/UL — SIGNIFICANT CHANGE UP (ref 3.8–10.5)

## 2024-05-24 PROCEDURE — 96375 TX/PRO/DX INJ NEW DRUG ADDON: CPT

## 2024-05-24 PROCEDURE — 99285 EMERGENCY DEPT VISIT HI MDM: CPT | Mod: 25

## 2024-05-24 PROCEDURE — 74177 CT ABD & PELVIS W/CONTRAST: CPT | Mod: 26,MC

## 2024-05-24 PROCEDURE — 74177 CT ABD & PELVIS W/CONTRAST: CPT | Mod: MC

## 2024-05-24 PROCEDURE — 93970 EXTREMITY STUDY: CPT | Mod: 26

## 2024-05-24 PROCEDURE — 96374 THER/PROPH/DIAG INJ IV PUSH: CPT | Mod: XU

## 2024-05-24 PROCEDURE — 93970 EXTREMITY STUDY: CPT

## 2024-05-24 PROCEDURE — 36415 COLL VENOUS BLD VENIPUNCTURE: CPT

## 2024-05-24 PROCEDURE — 81003 URINALYSIS AUTO W/O SCOPE: CPT

## 2024-05-24 PROCEDURE — 80053 COMPREHEN METABOLIC PANEL: CPT

## 2024-05-24 PROCEDURE — 87086 URINE CULTURE/COLONY COUNT: CPT

## 2024-05-24 PROCEDURE — 99285 EMERGENCY DEPT VISIT HI MDM: CPT | Mod: FS

## 2024-05-24 PROCEDURE — 85025 COMPLETE CBC W/AUTO DIFF WBC: CPT

## 2024-05-24 RX ORDER — IBUPROFEN 200 MG
1 TABLET ORAL
Qty: 20 | Refills: 0
Start: 2024-05-24 | End: 2024-05-28

## 2024-05-24 RX ORDER — SODIUM CHLORIDE 9 MG/ML
3 INJECTION INTRAMUSCULAR; INTRAVENOUS; SUBCUTANEOUS ONCE
Refills: 0 | Status: COMPLETED | OUTPATIENT
Start: 2024-05-24 | End: 2024-05-24

## 2024-05-24 RX ORDER — DIAZEPAM 5 MG
1 TABLET ORAL
Qty: 9 | Refills: 0
Start: 2024-05-24 | End: 2024-05-26

## 2024-05-24 RX ORDER — DIAZEPAM 5 MG
5 TABLET ORAL ONCE
Refills: 0 | Status: DISCONTINUED | OUTPATIENT
Start: 2024-05-24 | End: 2024-05-24

## 2024-05-24 RX ORDER — KETOROLAC TROMETHAMINE 30 MG/ML
15 SYRINGE (ML) INJECTION ONCE
Refills: 0 | Status: DISCONTINUED | OUTPATIENT
Start: 2024-05-24 | End: 2024-05-24

## 2024-05-24 RX ORDER — ACETAMINOPHEN 500 MG
1000 TABLET ORAL ONCE
Refills: 0 | Status: COMPLETED | OUTPATIENT
Start: 2024-05-24 | End: 2024-05-24

## 2024-05-24 RX ADMIN — Medication 400 MILLIGRAM(S): at 14:47

## 2024-05-24 RX ADMIN — Medication 15 MILLIGRAM(S): at 13:43

## 2024-05-24 RX ADMIN — SODIUM CHLORIDE 3 MILLILITER(S): 9 INJECTION INTRAMUSCULAR; INTRAVENOUS; SUBCUTANEOUS at 13:46

## 2024-05-24 RX ADMIN — Medication 15 MILLIGRAM(S): at 13:46

## 2024-05-24 RX ADMIN — Medication 5 MILLIGRAM(S): at 16:55

## 2024-05-24 NOTE — ED PROVIDER NOTE - PROGRESS NOTE DETAILS
Patient plan discussed with attending will continue to follow. -Mera Benitez PA-C pt awaiting imaging results and signed out to MD to follow imaging. -Mera Benitez PA-C US negative, pt ambulating, asking for d/c. Return precautions provided. Kathy Davis DO Attending Physician:     HPI:  82 y/o male with PMHx of CAD, cerebellar infarct, COPD, HLD, HTN, hypercholesteremia, prostate cancer s/p radiation, angioplasty s/p stent; presents to the ED c/o right sided midback pain from last few days. states pain radiates down his right buttocks and right Lower Extremity. went to Urgent care a few days ago was dx with sciatica and given Robaxin with minimal relief. pt. saw his chiropractor on Wednesday and had his back adjusted, woke up with increase pain to his lower back. reported difficulty ambulating due to his back pain. denies any fever, vomiting, trauma, chest pain, sob, abdominal pain, numbness, tingling. no urinary complaints, flank pain or any other complaints.    General: Patient in no acute distress, AAOX3.   HENMT: NC/AT, no nasal congestion, MMM  Neck: supple, no midline ttp  CVS: regular rate and rhythm, no murmur  Resp: Good air entry bilaterally, No wheeze/rhonchi.  Abd: Soft non tender, non distended, +bowel sounds, No guarding, rebound tenderness   Ext: FROM in all ext, 2+ pulses throughout  BACK: no midline tenderness, no stepoffs, no CVA ttp  NEURO: no focal deficit, gross motor and sensory intact throughout, no cerebellar signs.    A/P: will r/o any fracture vs dvt vs muscular pain vs sciatica. Plan to do labs, ekg, Ct, US. meds and reassess.

## 2024-05-24 NOTE — ED PROVIDER NOTE - CROS ED RESP ALL NEG
Const: NAD  Eyes: PERRL, no conjunctival injection  HENT:  Neck supple without meningismus   CV: RRR, Warm, well-perfused extremities  RESP: CTA B/L, no tachypnea  MSK: No gross deformities appreciated, tenderness to palpation of L toe, DP and tp pulses intact   Skin: Warm, dry. No rashes  Neuro: Alert,
negative...

## 2024-05-24 NOTE — ED PROVIDER NOTE - OBJECTIVE STATEMENT
84 y/o male with PMHx of CAD, cerebellar infarct, COPD, HLD, HTN, hypercholesteremia, prostate cancer s/p radiation, angioplasty s/p stent; presents to the ED c/o right sided midback pain radiating down his right buttocks and right Lower Extremity. Patient wife states patient sits all day in a chair and watches TV. Patient wife states they went to Urgent care a few days ago  was dx with sciatica and given Robaxin with minimal relief.. Patient saw his chiropractor on Wednesday and had his back adjusted, today he woke up with increase pain to his lower back and has difficulty ambulating due to his back pain. patient and wife denies any trauma, chest pain, sob, abdominal pain, nausea, vomiting, numbness, tingling, urinary complaints, flank pain or any other complaints.

## 2024-05-24 NOTE — ED ADULT TRIAGE NOTE - CHIEF COMPLAINT QUOTE
pt presents to the ED for back pain without injury. recently seen at [facility] for this issue and was given muscle relaxers which provided no relief. pt A&Ox4, well appearing, and normally ambulatory at baseline with no further complaints or discomforts reported at this time.

## 2024-05-24 NOTE — ED PROVIDER NOTE - ATTENDING APP SHARED VISIT CONTRIBUTION OF CARE
I, Kathy Davis DO, personally saw the patient with ACP.  I have personally performed a face to face diagnostic evaluation on this patient.  I have reviewed the ACP note and agree with the history, exam, and plan of care, except as noted.  I personally saw the patient and performed a substantive portion of the visit including all aspects of the medical decision making.

## 2024-05-24 NOTE — ED PROVIDER NOTE - CLINICAL SUMMARY MEDICAL DECISION MAKING FREE TEXT BOX
84 y/o male with PMHx of CAD, cerebellar infarct, COPD, HLD, HTN, hypercholesteremia, prostate cancer s/p radiation, angioplasty s/p stent; presents to the ED c/o right sided midback pain radiating down his right buttocks and right Lower Extremity. Plan: pain control, ct abd/pelvis, us b/l rhina VALENTINE reeval. -Mera Benitez PA-C

## 2024-05-24 NOTE — ED PROVIDER NOTE - PATIENT PORTAL LINK FT
You can access the FollowMyHealth Patient Portal offered by [facility] by registering at the following website: [email or web address]. By joining [employer]’s FollowMyHealth portal, you will also be able to view your health information using other applications (apps) compatible with our system.

## 2024-05-25 ENCOUNTER — TRANSCRIPTION ENCOUNTER (OUTPATIENT)
Age: 84
End: 2024-05-25

## 2024-05-26 LAB
CULTURE RESULTS: SIGNIFICANT CHANGE UP
SPECIMEN SOURCE: SIGNIFICANT CHANGE UP

## 2024-05-28 ENCOUNTER — APPOINTMENT (OUTPATIENT)
Dept: FAMILY MEDICINE | Facility: CLINIC | Age: 84
End: 2024-05-28
Payer: MEDICARE

## 2024-05-28 VITALS
SYSTOLIC BLOOD PRESSURE: 132 MMHG | HEART RATE: 87 BPM | BODY MASS INDEX: 29.73 KG/M2 | TEMPERATURE: 97.9 F | WEIGHT: 210 LBS | HEIGHT: 70.5 IN | DIASTOLIC BLOOD PRESSURE: 62 MMHG | OXYGEN SATURATION: 97 %

## 2024-05-28 DIAGNOSIS — M54.30 SCIATICA, UNSPECIFIED SIDE: ICD-10-CM

## 2024-05-28 PROCEDURE — 99214 OFFICE O/P EST MOD 30 MIN: CPT

## 2024-05-28 RX ORDER — METHYLPREDNISOLONE 4 MG/1
4 TABLET ORAL
Qty: 1 | Refills: 0 | Status: ACTIVE | COMMUNITY
Start: 2024-05-28 | End: 1900-01-01

## 2024-05-28 RX ORDER — CYCLOBENZAPRINE HYDROCHLORIDE 5 MG/1
5 TABLET, FILM COATED ORAL 3 TIMES DAILY
Qty: 30 | Refills: 0 | Status: ACTIVE | COMMUNITY
Start: 2024-05-28 | End: 1900-01-01

## 2024-05-28 RX ORDER — IBUPROFEN 600 MG/1
600 TABLET, FILM COATED ORAL 4 TIMES DAILY
Qty: 28 | Refills: 0 | Status: ACTIVE | COMMUNITY
Start: 2024-05-28 | End: 1900-01-01

## 2024-05-28 NOTE — PHYSICAL EXAM
[Normal] : no rash [de-identified] : wincing from pain  [de-identified] : lower back pain radiating to right buttock and thigh, ambulating with a walker and wheelchair  [de-identified] : speech is slow, wife and pt state it is due to the exhaustion

## 2024-05-28 NOTE — PLAN
[FreeTextEntry1] : - be on the lookout for hyperglycemia as steroids can increase sugar  - discussed potential side effects of cyclobenzaprine - pt and wife verbalized understanding

## 2024-05-28 NOTE — REVIEW OF SYSTEMS
[Fever] : no fever [Chills] : no chills [Fatigue] : fatigue [Hot Flashes] : no hot flashes [Night Sweats] : no night sweats [Recent Change In Weight] : ~T no recent weight change [Negative] : Psychiatric [FreeTextEntry9] : lower back pain radiating down to right buttock and leg

## 2024-05-28 NOTE — HISTORY OF PRESENT ILLNESS
[FreeTextEntry8] : - pt was experiencing excruciating pain from lower back down to right ankle, right buttock  - went to  ER last friday- ruled out DVT bilaterally, abd CT WNL - discharged on 600mg of Ibuprofen, Tylenol and Valium which help a little but still in pain  - was doing PT for last couple of months for sciatica, stopped 2 weeks ago  - hasn't seen ortho  - using a walker and wheelchair  - denies falls  [Family Member] : family member

## 2024-06-12 ENCOUNTER — EMERGENCY (EMERGENCY)
Facility: HOSPITAL | Age: 84
LOS: 0 days | Discharge: HOME | End: 2024-06-12
Attending: EMERGENCY MEDICINE
Payer: MEDICARE

## 2024-06-12 VITALS
SYSTOLIC BLOOD PRESSURE: 157 MMHG | TEMPERATURE: 98 F | HEART RATE: 83 BPM | DIASTOLIC BLOOD PRESSURE: 72 MMHG | RESPIRATION RATE: 16 BRPM | OXYGEN SATURATION: 99 %

## 2024-06-12 VITALS
SYSTOLIC BLOOD PRESSURE: 108 MMHG | HEART RATE: 95 BPM | OXYGEN SATURATION: 97 % | DIASTOLIC BLOOD PRESSURE: 57 MMHG | RESPIRATION RATE: 18 BRPM | TEMPERATURE: 99 F | HEIGHT: 67 IN

## 2024-06-12 DIAGNOSIS — Z95.5 PRESENCE OF CORONARY ANGIOPLASTY IMPLANT AND GRAFT: ICD-10-CM

## 2024-06-12 DIAGNOSIS — Z88.5 ALLERGY STATUS TO NARCOTIC AGENT: ICD-10-CM

## 2024-06-12 DIAGNOSIS — Z79.01 LONG TERM (CURRENT) USE OF ANTICOAGULANTS: ICD-10-CM

## 2024-06-12 DIAGNOSIS — I10 ESSENTIAL (PRIMARY) HYPERTENSION: ICD-10-CM

## 2024-06-12 DIAGNOSIS — I25.10 ATHEROSCLEROTIC HEART DISEASE OF NATIVE CORONARY ARTERY WITHOUT ANGINA PECTORIS: ICD-10-CM

## 2024-06-12 DIAGNOSIS — Z86.73 PERSONAL HISTORY OF TRANSIENT ISCHEMIC ATTACK (TIA), AND CEREBRAL INFARCTION WITHOUT RESIDUAL DEFICITS: ICD-10-CM

## 2024-06-12 DIAGNOSIS — K62.5 HEMORRHAGE OF ANUS AND RECTUM: ICD-10-CM

## 2024-06-12 DIAGNOSIS — Z79.02 LONG TERM (CURRENT) USE OF ANTITHROMBOTICS/ANTIPLATELETS: ICD-10-CM

## 2024-06-12 DIAGNOSIS — E78.5 HYPERLIPIDEMIA, UNSPECIFIED: ICD-10-CM

## 2024-06-12 DIAGNOSIS — J44.9 CHRONIC OBSTRUCTIVE PULMONARY DISEASE, UNSPECIFIED: ICD-10-CM

## 2024-06-12 LAB
ALBUMIN SERPL ELPH-MCNC: 2.8 G/DL — LOW (ref 3.3–5)
ALP SERPL-CCNC: 64 U/L — SIGNIFICANT CHANGE UP (ref 40–120)
ALT FLD-CCNC: 11 U/L — LOW (ref 12–78)
ANION GAP SERPL CALC-SCNC: 3 MMOL/L — LOW (ref 5–17)
APTT BLD: 35.6 SEC — SIGNIFICANT CHANGE UP (ref 24.5–35.6)
AST SERPL-CCNC: 13 U/L — LOW (ref 15–37)
BASOPHILS # BLD AUTO: 0.07 K/UL — SIGNIFICANT CHANGE UP (ref 0–0.2)
BASOPHILS NFR BLD AUTO: 0.7 % — SIGNIFICANT CHANGE UP (ref 0–2)
BILIRUB SERPL-MCNC: 0.3 MG/DL — SIGNIFICANT CHANGE UP (ref 0.2–1.2)
BLD GP AB SCN SERPL QL: SIGNIFICANT CHANGE UP
BUN SERPL-MCNC: 22 MG/DL — SIGNIFICANT CHANGE UP (ref 7–23)
CALCIUM SERPL-MCNC: 9 MG/DL — SIGNIFICANT CHANGE UP (ref 8.5–10.1)
CHLORIDE SERPL-SCNC: 110 MMOL/L — HIGH (ref 96–108)
CO2 SERPL-SCNC: 28 MMOL/L — SIGNIFICANT CHANGE UP (ref 22–31)
CREAT SERPL-MCNC: 0.98 MG/DL — SIGNIFICANT CHANGE UP (ref 0.5–1.3)
EGFR: 76 ML/MIN/1.73M2 — SIGNIFICANT CHANGE UP
EOSINOPHIL # BLD AUTO: 0.35 K/UL — SIGNIFICANT CHANGE UP (ref 0–0.5)
EOSINOPHIL NFR BLD AUTO: 3.3 % — SIGNIFICANT CHANGE UP (ref 0–6)
GLUCOSE SERPL-MCNC: 112 MG/DL — HIGH (ref 70–99)
HCT VFR BLD CALC: 36.6 % — LOW (ref 39–50)
HGB BLD-MCNC: 12.2 G/DL — LOW (ref 13–17)
IMM GRANULOCYTES NFR BLD AUTO: 0.8 % — SIGNIFICANT CHANGE UP (ref 0–0.9)
INR BLD: 1.62 RATIO — HIGH (ref 0.85–1.18)
LIDOCAIN IGE QN: 12 U/L — LOW (ref 13–75)
LYMPHOCYTES # BLD AUTO: 2.15 K/UL — SIGNIFICANT CHANGE UP (ref 1–3.3)
LYMPHOCYTES # BLD AUTO: 20.6 % — SIGNIFICANT CHANGE UP (ref 13–44)
MCHC RBC-ENTMCNC: 30.6 PG — SIGNIFICANT CHANGE UP (ref 27–34)
MCHC RBC-ENTMCNC: 33.3 GM/DL — SIGNIFICANT CHANGE UP (ref 32–36)
MCV RBC AUTO: 91.7 FL — SIGNIFICANT CHANGE UP (ref 80–100)
MONOCYTES # BLD AUTO: 0.87 K/UL — SIGNIFICANT CHANGE UP (ref 0–0.9)
MONOCYTES NFR BLD AUTO: 8.3 % — SIGNIFICANT CHANGE UP (ref 2–14)
NEUTROPHILS # BLD AUTO: 6.93 K/UL — SIGNIFICANT CHANGE UP (ref 1.8–7.4)
NEUTROPHILS NFR BLD AUTO: 66.3 % — SIGNIFICANT CHANGE UP (ref 43–77)
PLATELET # BLD AUTO: 203 K/UL — SIGNIFICANT CHANGE UP (ref 150–400)
POTASSIUM SERPL-MCNC: 3.7 MMOL/L — SIGNIFICANT CHANGE UP (ref 3.5–5.3)
POTASSIUM SERPL-SCNC: 3.7 MMOL/L — SIGNIFICANT CHANGE UP (ref 3.5–5.3)
PROT SERPL-MCNC: 6.6 GM/DL — SIGNIFICANT CHANGE UP (ref 6–8.3)
PROTHROM AB SERPL-ACNC: 18.1 SEC — HIGH (ref 9.5–13)
RBC # BLD: 3.99 M/UL — LOW (ref 4.2–5.8)
RBC # FLD: 12.6 % — SIGNIFICANT CHANGE UP (ref 10.3–14.5)
SODIUM SERPL-SCNC: 141 MMOL/L — SIGNIFICANT CHANGE UP (ref 135–145)
WBC # BLD: 10.45 K/UL — SIGNIFICANT CHANGE UP (ref 3.8–10.5)
WBC # FLD AUTO: 10.45 K/UL — SIGNIFICANT CHANGE UP (ref 3.8–10.5)

## 2024-06-12 PROCEDURE — C9113: CPT

## 2024-06-12 PROCEDURE — 36415 COLL VENOUS BLD VENIPUNCTURE: CPT

## 2024-06-12 PROCEDURE — 96374 THER/PROPH/DIAG INJ IV PUSH: CPT | Mod: XU

## 2024-06-12 PROCEDURE — 83690 ASSAY OF LIPASE: CPT

## 2024-06-12 PROCEDURE — 85730 THROMBOPLASTIN TIME PARTIAL: CPT

## 2024-06-12 PROCEDURE — 86901 BLOOD TYPING SEROLOGIC RH(D): CPT

## 2024-06-12 PROCEDURE — 86900 BLOOD TYPING SEROLOGIC ABO: CPT

## 2024-06-12 PROCEDURE — 85025 COMPLETE CBC W/AUTO DIFF WBC: CPT

## 2024-06-12 PROCEDURE — 74178 CT ABD&PLV WO CNTR FLWD CNTR: CPT | Mod: MC

## 2024-06-12 PROCEDURE — 74178 CT ABD&PLV WO CNTR FLWD CNTR: CPT | Mod: 26,MC

## 2024-06-12 PROCEDURE — 86850 RBC ANTIBODY SCREEN: CPT

## 2024-06-12 PROCEDURE — 99285 EMERGENCY DEPT VISIT HI MDM: CPT

## 2024-06-12 PROCEDURE — 99284 EMERGENCY DEPT VISIT MOD MDM: CPT | Mod: 25

## 2024-06-12 PROCEDURE — 80053 COMPREHEN METABOLIC PANEL: CPT

## 2024-06-12 PROCEDURE — 85610 PROTHROMBIN TIME: CPT

## 2024-06-12 RX ORDER — PANTOPRAZOLE SODIUM 20 MG/1
8 TABLET, DELAYED RELEASE ORAL
Qty: 80 | Refills: 0 | Status: DISCONTINUED | OUTPATIENT
Start: 2024-06-12 | End: 2024-06-12

## 2024-06-12 RX ORDER — ACETAMINOPHEN 500 MG
975 TABLET ORAL ONCE
Refills: 0 | Status: COMPLETED | OUTPATIENT
Start: 2024-06-12 | End: 2024-06-12

## 2024-06-12 RX ORDER — PANTOPRAZOLE SODIUM 20 MG/1
40 TABLET, DELAYED RELEASE ORAL ONCE
Refills: 0 | Status: COMPLETED | OUTPATIENT
Start: 2024-06-12 | End: 2024-06-12

## 2024-06-12 RX ADMIN — PANTOPRAZOLE SODIUM 40 MILLIGRAM(S): 20 TABLET, DELAYED RELEASE ORAL at 16:04

## 2024-06-12 RX ADMIN — Medication 975 MILLIGRAM(S): at 16:04

## 2024-06-12 NOTE — ED ADULT TRIAGE NOTE - ADDITIONAL SAFETY/BANDS...
Purpose of SN Visit:  Obtain Labs    Complaint/Concerns Shared: Fatigue, Anemia    Venipuncture x1   Blood Sample obtained from  [facility]  Patient Tolerated Well    Following Tests were ordered   CBC w/o diff  Ferritin  B12 & Folate    Labs taken to  [facility]     Vital Signs :  155/73, HR 76, Temp 98.5F
Additional Safety/Bands:

## 2024-06-12 NOTE — ED PROVIDER NOTE - PATIENT PORTAL LINK FT
You can access the FollowMyHealth Patient Portal offered by [facility] by registering at the following website: [email or web address]. By joining [facility]’s FollowMyHealth portal, you will also be able to view your health information using other applications (apps) compatible with our system.

## 2024-06-12 NOTE — ED PROVIDER NOTE - CLINICAL SUMMARY MEDICAL DECISION MAKING FREE TEXT BOX
Ddx: GI Bleed/ ro anemia/ diverticulitis  Plan: Cbc, cmp, t and s, pt/ptt, protonix, Gi consult, likely admit

## 2024-06-12 NOTE — ED ADULT TRIAGE NOTE - CHIEF COMPLAINT QUOTE
Pt presents to the ED c/o rectal bleeding with bowel movements since yesterday. Denies pain or weakness. Pt takes Eliquis and Plavix.

## 2024-06-12 NOTE — ED PROVIDER NOTE - DATE/TIME 1
Manual Repair Warning Statement: We plan on removing the manually selected variable below in favor of our much easier automatic structured text blocks found in the previous tab. We decided to do this to help make the flow better and give you the full power of structured data. Manual selection is never going to be ideal in our platform and I would encourage you to avoid using manual selection from this point on, especially since I will be sunsetting this feature. It is important that you do one of two things with the customized text below. First, you can save all of the text in a word file so you can have it for future reference. Second, transfer the text to the appropriate area in the Library tab. Lastly, if there is a flap or graft type which we do not have you need to let us know right away so I can add it in before the variable is hidden. No need to panic, we plan to give you roughly 6 months to make the change. 12-Jun-2024 16:09

## 2024-06-12 NOTE — ED PROVIDER NOTE - GASTROINTESTINAL, MLM
Abdomen soft, non-tender, no guarding. Rectal shows maroon stool, guaiac positive, lot [id number], exp 9/30/24

## 2024-06-12 NOTE — ED PROVIDER NOTE - CARE PROVIDER_API CALL
Brian Glover  Gastroenterology  [address] [place] [address]  Phone: [phone]  Fax: [phone]  Follow Up Time: 1-3 Days

## 2024-06-12 NOTE — ED PROVIDER NOTE - PROGRESS NOTE DETAILS
Pt labs wnl. Pt has no episodes of bleeding in ED. CT negative for active GI bleed. Patient offered admission to see GI tomorrow, but pt and wife decline, wants to be d/c. Strict return precautions provided, and is ready for d/c.

## 2024-06-12 NOTE — ED PROVIDER NOTE - OBJECTIVE STATEMENT
Patient is an 83-year-old gentleman with past medical history of hypertension, hyperlipidemia, CAD status post 3 stents on Eliquis and Plavix, CVA, COPD, prostate cancer status postradiation who presents to the ED because of rectal bleeding.  This started yesterday.  Had another episode today for total of 2 episodes.  No lightheadedness, no abdominal pain, no chest pain, no shortness of breath.  No history of blood transfusion.  Last colonoscopy 5 years ago which showed polyps, was told may not need further colonoscopies in the future due to his age.

## 2024-06-12 NOTE — ED ADULT NURSE NOTE - NSFALLHARMRISKINTERV_ED_ALL_ED
Assistance OOB with selected safe patient handling equipment if applicable/Communicate risk of Fall with Harm to all staff, patient, and family/Monitor gait and stability/Provide patient with walking aids/Provide visual cue: red socks, yellow wristband, yellow gown, etc/Reinforce activity limits and safety measures with patient and family/Toileting schedule using arm’s reach rule for commode and bathroom/Bed in lowest position, wheels locked, appropriate side rails in place/Call bell, personal items and telephone in reach/Instruct patient to call for assistance before getting out of bed/chair/stretcher/Non-slip footwear applied when patient is off stretcher/[place] to call system/Physically safe environment - no spills, clutter or unnecessary equipment/Purposeful Proactive Rounding/Room/bathroom lighting operational, light cord in reach

## 2024-07-22 ENCOUNTER — NON-APPOINTMENT (OUTPATIENT)
Age: 84
End: 2024-07-22

## 2024-07-22 ENCOUNTER — APPOINTMENT (OUTPATIENT)
Dept: FAMILY MEDICINE | Facility: CLINIC | Age: 84
End: 2024-07-22
Payer: MEDICARE

## 2024-07-22 ENCOUNTER — RESULT CHARGE (OUTPATIENT)
Age: 84
End: 2024-07-22

## 2024-07-22 VITALS
HEART RATE: 85 BPM | TEMPERATURE: 97.3 F | HEIGHT: 70.5 IN | OXYGEN SATURATION: 96 % | BODY MASS INDEX: 29.02 KG/M2 | SYSTOLIC BLOOD PRESSURE: 110 MMHG | DIASTOLIC BLOOD PRESSURE: 70 MMHG | WEIGHT: 205 LBS

## 2024-07-22 LAB
BILIRUB UR QL STRIP: NEGATIVE
CLARITY UR: CLEAR
COLLECTION METHOD: NORMAL
GLUCOSE UR-MCNC: NEGATIVE
HCG UR QL: 0.2 EU/DL
HGB UR QL STRIP.AUTO: NORMAL
KETONES UR-MCNC: NORMAL
LEUKOCYTE ESTERASE UR QL STRIP: NORMAL
NITRITE UR QL STRIP: NORMAL
PH UR STRIP: 6.5
PROT UR STRIP-MCNC: 30
SP GR UR STRIP: 1.02

## 2024-07-22 PROCEDURE — 81003 URINALYSIS AUTO W/O SCOPE: CPT | Mod: QW

## 2024-07-22 PROCEDURE — 99214 OFFICE O/P EST MOD 30 MIN: CPT

## 2024-07-22 RX ORDER — SULFAMETHOXAZOLE AND TRIMETHOPRIM 800; 160 MG/1; MG/1
800-160 TABLET ORAL TWICE DAILY
Qty: 14 | Refills: 0 | Status: ACTIVE | COMMUNITY
Start: 2024-07-22 | End: 1900-01-01

## 2024-07-22 NOTE — REVIEW OF SYSTEMS
[Dysuria] : no dysuria [Incontinence] : no incontinence [Nocturia] : nocturia [Hematuria] : hematuria [Frequency] : no frequency [Negative] : Heme/Lymph

## 2024-07-22 NOTE — HISTORY OF PRESENT ILLNESS
[FreeTextEntry8] : Pt reports gross hematuria that began yesterday. States there was significant blood yesterday and last night, but has since stopped.  Denies any further episodes of gross hematuria today. Seen at [facility] yesterday, urine test showed blood but was not prescribe antibiotics so presumably pt did not have evidence of UTI on urine dip.  Urine was reportedly sent for culture. Quit smoking about 20 years ago, smoked cigars thereafter until about 4 years ago.  Was smoking cigarettes for many years prior. Pt denies lightheadedness, dizziness, weakness, LOC, SOB, pallor.  Pt is scheduled for TERRY lumbar spine on Wednesday. Stopped Plavix 1 week ago. Stopped Eliquis yesterday. Plans to resume blood thinning medications on Thursday after TERRY.  Hx TURBT listed in chart but pt and wife deny prior history of bladder tumor/cancer/surgeries.  Pt has hx prostate cancer. Follows with Urology, Dr. Pete, yearly who is away on vacation this week.  CT A/P wwo contrast from 6/12/2024 revealed normal bladder/kidneys and no signs of kidney stones.

## 2024-07-22 NOTE — ASSESSMENT
[FreeTextEntry1] : Patient is an 84yo male who presents to the office with his wife complaining of gross hematuria.  Gross hematuria occurred yesterday, resolved today.  Pt is feeling well otherwise.  Pt is typically on Plavix and Eliquis but this is currently on hold pending upcoming TERRY of lumbar spine.  Gross Hematuria - UA in office +blood, +LE. - Will start Bactrim in setting of +LE, will d/c if culture comes back negative. - Urine culture sent to lab. - Urgent f/u with Urology --  tasked to schedule appointment for pt (Dr. Pete is away on vacation all week and does not have a covering provider). - Pt should hold blood thinning medication as directed by spine specialist, however continued holding of medication should be discussed with Pulmonology (Dr. Lopes) and Cardiology (Dr. Chaudhary) given PMH of saddle PE and CAD. - Pt and wife given strict call back/ED precautions including return of significant gross hematuria, lightheadedness, dizziness, LOC, SOB, pallor, etc. - Call the office or go to the ED immediately if you develop new, worsening or concerning symptoms including high fever, severe headache/worst headache of your life, confusion, dizziness/lightheadedness, loss of consciousness, severe chest pain, difficulty breathing, shortness of breath, severe abdominal pain, excessive vomiting/diarrhea, inability to feel/move the extremities, or any other concerning symptoms.

## 2024-07-23 ENCOUNTER — APPOINTMENT (OUTPATIENT)
Dept: UROLOGY | Facility: CLINIC | Age: 84
End: 2024-07-23
Payer: MEDICARE

## 2024-07-23 VITALS
TEMPERATURE: 97.6 F | HEIGHT: 70.5 IN | RESPIRATION RATE: 14 BRPM | OXYGEN SATURATION: 97 % | DIASTOLIC BLOOD PRESSURE: 63 MMHG | WEIGHT: 205 LBS | BODY MASS INDEX: 29.02 KG/M2 | SYSTOLIC BLOOD PRESSURE: 108 MMHG | HEART RATE: 70 BPM

## 2024-07-23 DIAGNOSIS — N40.1 BENIGN PROSTATIC HYPERPLASIA WITH LOWER URINARY TRACT SYMPMS: ICD-10-CM

## 2024-07-23 DIAGNOSIS — R31.0 GROSS HEMATURIA: ICD-10-CM

## 2024-07-23 DIAGNOSIS — N13.8 BENIGN PROSTATIC HYPERPLASIA WITH LOWER URINARY TRACT SYMPMS: ICD-10-CM

## 2024-07-23 LAB — URINE CYTOLOGY: NORMAL

## 2024-07-23 PROCEDURE — 99204 OFFICE O/P NEW MOD 45 MIN: CPT

## 2024-07-23 RX ORDER — SULFAMETHOXAZOLE AND TRIMETHOPRIM 800; 160 MG/1; MG/1
800-160 TABLET ORAL TWICE DAILY
Qty: 20 | Refills: 0 | Status: ACTIVE | COMMUNITY
Start: 2024-07-23 | End: 1900-01-01

## 2024-07-23 NOTE — HISTORY OF PRESENT ILLNESS
[FreeTextEntry1] : ELENA BROWN is a 83 year old male presents for gross hematuria. Patient recently had a 1 day episode of gross, painless hematuria. Pt states that he does have some occasional lower tract symptoms, but he generally feels that he is doing well. He is on Eliquis 5 mg x2 daily at this time. Pt states he does not have a history of urinary issues in the past.

## 2024-07-23 NOTE — ADDENDUM
[FreeTextEntry1] :  I, Olga Nance , acted as a scribe on behalf of Dr. Marcano, 07/23/2024.  I, Alanna Choi , acted as a scribe on behalf of Dr. Marcano, 07/23/2024.   All medical record entries made by the Jodie Jose were at my, Dr. Marcano, direction and personally dictated by me on 07/23/2024. I have reviewed the chart and agree that the record accurately reflects my personal performance of the history, physical exam, assessment, and plan. I have also personally directed, reviewed, and agreed with the chart.

## 2024-07-23 NOTE — PHYSICAL EXAM
[Normal Appearance] : normal appearance [Well Groomed] : well groomed [General Appearance - In No Acute Distress] : no acute distress [Edema] : no peripheral edema [Respiration, Rhythm And Depth] : normal respiratory rhythm and effort [Exaggerated Use Of Accessory Muscles For Inspiration] : no accessory muscle use [Abdomen Soft] : soft [Abdomen Tenderness] : non-tender [Costovertebral Angle Tenderness] : no ~M costovertebral angle tenderness [Urinary Bladder Findings] : the bladder was normal on palpation [Normal Station and Gait] : the gait and station were normal for the patient's age [] : no rash [No Focal Deficits] : no focal deficits [Oriented To Time, Place, And Person] : oriented to person, place, and time [Affect] : the affect was normal [Mood] : the mood was normal [No Palpable Adenopathy] : no palpable adenopathy [de-identified] : External genitalia is normal and prostate gland is small and palpably benign

## 2024-07-23 NOTE — PHYSICAL EXAM
[Normal Appearance] : normal appearance [Well Groomed] : well groomed [General Appearance - In No Acute Distress] : no acute distress [Edema] : no peripheral edema [Respiration, Rhythm And Depth] : normal respiratory rhythm and effort [Exaggerated Use Of Accessory Muscles For Inspiration] : no accessory muscle use [Abdomen Soft] : soft [Abdomen Tenderness] : non-tender [Costovertebral Angle Tenderness] : no ~M costovertebral angle tenderness [Urinary Bladder Findings] : the bladder was normal on palpation [Normal Station and Gait] : the gait and station were normal for the patient's age [No Focal Deficits] : no focal deficits [] : no rash [Oriented To Time, Place, And Person] : oriented to person, place, and time [Affect] : the affect was normal [Mood] : the mood was normal [No Palpable Adenopathy] : no palpable adenopathy [de-identified] : External genitalia is normal and prostate gland is small and palpably benign

## 2024-07-23 NOTE — END OF VISIT
[FreeTextEntry3] : REC: CT scan ordered with and without IV contrast. He will follow up with cystoscopy. He will also Start: Sulfamethoxazole-Trimethoprim 800-160 MG Oral Tablet (Bactrim DS); TAKE 1 TABLET TWICE DAILY UNTIL FINISHED. Obtain PSA, urine analysis, and cytology.   I, Olga Nance , acted as a scribe on behalf of Dr. Marcano, 07/23/2024.  All medical record entries made by the Matheus Josee were at my, Dr. Marcano, direction and personally dictated by me on 07/23/2024. I have reviewed the chart and agree that the record accurately reflects my personal performance of the history, physical exam, assessment, and plan. I have also personally directed, reviewed, and agreed with the chart.

## 2024-07-24 LAB
ANION GAP SERPL CALC-SCNC: 13 MMOL/L
APPEARANCE: CLEAR
BACTERIA UR CULT: NORMAL
BACTERIA: NEGATIVE /HPF
BILIRUBIN URINE: NEGATIVE
BLOOD URINE: NEGATIVE
BUN SERPL-MCNC: 14 MG/DL
CALCIUM SERPL-MCNC: 9 MG/DL
CAST: 0 /LPF
CHLORIDE SERPL-SCNC: 104 MMOL/L
CO2 SERPL-SCNC: 26 MMOL/L
COLOR: YELLOW
CREAT SERPL-MCNC: 1.08 MG/DL
EGFR: 67 ML/MIN/1.73M2
EPITHELIAL CELLS: 2 /HPF
GLUCOSE QUALITATIVE U: NEGATIVE MG/DL
GLUCOSE SERPL-MCNC: 106 MG/DL
KETONES URINE: ABNORMAL MG/DL
LEUKOCYTE ESTERASE URINE: ABNORMAL
MICROSCOPIC-UA: NORMAL
NITRITE URINE: NEGATIVE
PH URINE: 6.5
POTASSIUM SERPL-SCNC: 4.4 MMOL/L
PROTEIN URINE: NORMAL MG/DL
PSA SERPL-MCNC: 0.11 NG/ML
RED BLOOD CELLS URINE: 2 /HPF
SODIUM SERPL-SCNC: 142 MMOL/L
SPECIFIC GRAVITY URINE: 1.03
UROBILINOGEN URINE: 1 MG/DL
WHITE BLOOD CELLS URINE: 11 /HPF

## 2024-07-25 ENCOUNTER — APPOINTMENT (OUTPATIENT)
Dept: CT IMAGING | Facility: CLINIC | Age: 84
End: 2024-07-25
Payer: MEDICARE

## 2024-07-25 LAB — BACTERIA UR CULT: NORMAL

## 2024-07-25 PROCEDURE — 74178 CT ABD&PLV WO CNTR FLWD CNTR: CPT | Mod: 26,MH

## 2024-07-26 LAB — URINE CYTOLOGY: NORMAL

## 2024-07-31 ENCOUNTER — NON-APPOINTMENT (OUTPATIENT)
Age: 84
End: 2024-07-31

## 2024-08-04 PROBLEM — D64.9 ANEMIA: Status: ACTIVE | Noted: 2024-08-04

## 2024-08-14 ENCOUNTER — LABORATORY RESULT (OUTPATIENT)
Age: 84
End: 2024-08-14

## 2024-08-14 ENCOUNTER — APPOINTMENT (OUTPATIENT)
Dept: FAMILY MEDICINE | Facility: CLINIC | Age: 84
End: 2024-08-14
Payer: MEDICARE

## 2024-08-14 VITALS
BODY MASS INDEX: 28.45 KG/M2 | SYSTOLIC BLOOD PRESSURE: 120 MMHG | DIASTOLIC BLOOD PRESSURE: 62 MMHG | WEIGHT: 201 LBS | TEMPERATURE: 97.1 F | HEART RATE: 87 BPM | OXYGEN SATURATION: 97 % | HEIGHT: 70.5 IN

## 2024-08-14 DIAGNOSIS — E78.00 PURE HYPERCHOLESTEROLEMIA, UNSPECIFIED: ICD-10-CM

## 2024-08-14 DIAGNOSIS — E11.9 TYPE 2 DIABETES MELLITUS W/OUT COMPLICATIONS: ICD-10-CM

## 2024-08-14 DIAGNOSIS — D64.9 ANEMIA, UNSPECIFIED: ICD-10-CM

## 2024-08-14 DIAGNOSIS — I63.549 CEREBRAL INFARCTION DUE TO UNSPECIFIED OCCLUSION OR STENOSIS OF UNSPECIFIED CEREBELLAR ARTERY: ICD-10-CM

## 2024-08-14 DIAGNOSIS — I25.10 ATHEROSCLEROTIC HEART DISEASE OF NATIVE CORONARY ARTERY W/OUT ANGINA PECTORIS: ICD-10-CM

## 2024-08-14 DIAGNOSIS — I10 ESSENTIAL (PRIMARY) HYPERTENSION: ICD-10-CM

## 2024-08-14 PROCEDURE — 36415 COLL VENOUS BLD VENIPUNCTURE: CPT

## 2024-08-14 PROCEDURE — 99214 OFFICE O/P EST MOD 30 MIN: CPT

## 2024-08-14 NOTE — ASSESSMENT
[FreeTextEntry1] : He is here to follow up on anemia, coronary artery disease, hypercholesterolemia, hypertension, diabetes, GERD, and a CVA.  He has a history of anemia in 2022 which improved in 2023. He was anemic again in April at his physical. He denied any bleeding at that time but he had an episode of gross hematuria last month. He is on Eliquis and Plavix but was holding these medications because of a planned epidural injection. His urine cytology and CT scan were negative and a cystoscopy was recommended. This is scheduled for 8/16.   He has not really changed his diet and he is not exercising. His wife states "all he does is sit on the couch and watch TV." He admits that he does not exercise at all.   He had back pain and went to Dr. Butterfield where he was given a steroid injection (?epidural) 2-3 weeks ago. His back pain has improved but he is still inactive.

## 2024-08-14 NOTE — HISTORY OF PRESENT ILLNESS
[FreeTextEntry1] : ELENA BROWN is a 83 year old male here for a follow up visit. [de-identified] : He has a history of coronary artery disease, hypercholesterolemia, hypertension, diabetes, GERD, and a CVA. 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  He had his annual physical in 4/2024. His fasting glucose and HgbA1c were improved but his cholesterol was higher and he was anemic. He was advised to work on diet and exercise to lower his cholesterol and return in 3-4 months to recheck his CBC and lipids.

## 2024-08-14 NOTE — HEALTH RISK ASSESSMENT
[No falls in past year] : Patient reported no falls in the past year [0] : 2) Feeling down, depressed, or hopeless: Not at all (0) [PHQ-2 Negative - No further assessment needed] : PHQ-2 Negative - No further assessment needed [20 or more] : 20 or more [> 15 Years] : > 15 Years [[id number]] : 0

## 2024-08-14 NOTE — HISTORY OF PRESENT ILLNESS
[FreeTextEntry1] : ELENA BROWN is a 83 year old male here for a follow up visit. [de-identified] : He has a history of coronary artery disease, hypercholesterolemia, hypertension, diabetes, GERD, and a CVA. 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  He had his annual physical in 4/2024. His fasting glucose and HgbA1c were improved but his cholesterol was higher and he was anemic. He was advised to work on diet and exercise to lower his cholesterol and return in 3-4 months to recheck his CBC and lipids.

## 2024-08-14 NOTE — PLAN
[FreeTextEntry1] : Continue all medications as prescribed. Check labs as above. Will adjust any medications based upon lab results.  Reviewed age-appropriate preventive screening tests with patient.  Discussed clean eating (eg Mediterranean style eating plan) and regular exercise/staying as physically active as possible.  Include balance exercises and strength training and core strengthening exercises for bone health and to decrease risk for falls.  Reviewed importance of good self care (e.g. meditation, yoga, adequate rest, regular exercise, magnesium, clean eating, etc.).  Follow up based upon lab results or for next physical in 4/2025 as scheduled or sooner based upon test results.  Face-to-face time spent with patient, over half in discussion of the above diagnoses and treatment plan: 30 minutes.

## 2024-08-14 NOTE — PHYSICAL EXAM
[No Carotid Bruits] : no carotid bruits [Soft] : abdomen soft [Non Tender] : non-tender [No Spinal Tenderness] : no spinal tenderness [No Joint Swelling] : no joint swelling [Grossly Normal Strength/Tone] : grossly normal strength/tone [No Rash] : no rash [No Focal Deficits] : no focal deficits [Normal] : affect was normal and insight and judgment were intact [de-identified] : 1+ BLE edema

## 2024-08-14 NOTE — HEALTH RISK ASSESSMENT
[No falls in past year] : Patient reported no falls in the past year [0] : 2) Feeling down, depressed, or hopeless: Not at all (0) [PHQ-2 Negative - No further assessment needed] : PHQ-2 Negative - No further assessment needed [20 or more] : 20 or more [> 15 Years] : > 15 Years [[id number]] : 0

## 2024-08-14 NOTE — PHYSICAL EXAM
[No Carotid Bruits] : no carotid bruits [Soft] : abdomen soft [Non Tender] : non-tender [No Spinal Tenderness] : no spinal tenderness [No Joint Swelling] : no joint swelling [Grossly Normal Strength/Tone] : grossly normal strength/tone [No Rash] : no rash [No Focal Deficits] : no focal deficits [Normal] : affect was normal and insight and judgment were intact [de-identified] : 1+ BLE edema

## 2024-08-16 ENCOUNTER — APPOINTMENT (OUTPATIENT)
Dept: UROLOGY | Facility: CLINIC | Age: 84
End: 2024-08-16
Payer: MEDICARE

## 2024-08-16 VITALS
OXYGEN SATURATION: 97 % | SYSTOLIC BLOOD PRESSURE: 137 MMHG | TEMPERATURE: 97.5 F | DIASTOLIC BLOOD PRESSURE: 69 MMHG | WEIGHT: 202 LBS | RESPIRATION RATE: 14 BRPM | HEART RATE: 60 BPM | HEIGHT: 70 IN | BODY MASS INDEX: 28.92 KG/M2

## 2024-08-16 DIAGNOSIS — R31.0 GROSS HEMATURIA: ICD-10-CM

## 2024-08-16 PROCEDURE — 99213 OFFICE O/P EST LOW 20 MIN: CPT

## 2024-08-18 NOTE — HISTORY OF PRESENT ILLNESS
[FreeTextEntry1] : This patient is here today for the evaluation of hematuria with cystoscopy.  His CAT scan was normal.

## 2024-08-18 NOTE — END OF VISIT
[FreeTextEntry3] : The patient refuses the evaluation which was also offered under anesthesia.  A full review of why the procedure was indicated and what the consequences were of a misdiagnosis were gone over and his wishes were respected were respectfully observed

## 2024-11-08 NOTE — ED ADULT TRIAGE NOTE - SPO2 (%)
Discharge instructions were given to the patient by Vicenta CALZADA. The patient left the Emergency Department ambulatory, alert and oriented and in no acute distress with 1 prescriptions. The patient was encouraged to call or return to the ED for worsening issues or problems and was encouraged to schedule a follow up appointment for continuing care. The patient verbalized understanding of discharge instructions and prescriptions, all questions were answered. The patient has no further concerns at this time.       98